# Patient Record
Sex: FEMALE | Race: WHITE | NOT HISPANIC OR LATINO | Employment: OTHER | URBAN - METROPOLITAN AREA
[De-identification: names, ages, dates, MRNs, and addresses within clinical notes are randomized per-mention and may not be internally consistent; named-entity substitution may affect disease eponyms.]

---

## 2017-08-30 ENCOUNTER — HOSPITAL ENCOUNTER (OUTPATIENT)
Facility: HOSPITAL | Age: 67
Setting detail: OBSERVATION
LOS: 1 days | Discharge: HOME | End: 2017-09-03
Attending: EMERGENCY MEDICINE | Admitting: FAMILY MEDICINE

## 2017-08-30 DIAGNOSIS — R07.9 CHEST PAIN, UNSPECIFIED TYPE: Primary | ICD-10-CM

## 2017-08-30 DIAGNOSIS — R26.2 DIFFICULTY WALKING: ICD-10-CM

## 2017-08-30 PROCEDURE — 93005 ELECTROCARDIOGRAM TRACING: CPT | Performed by: EMERGENCY MEDICINE

## 2017-08-30 PROCEDURE — 99285 EMERGENCY DEPT VISIT HI MDM: CPT

## 2017-08-31 ENCOUNTER — APPOINTMENT (OUTPATIENT)
Dept: NUCLEAR MEDICINE | Facility: HOSPITAL | Age: 67
End: 2017-08-31

## 2017-08-31 ENCOUNTER — APPOINTMENT (OUTPATIENT)
Dept: GENERAL RADIOLOGY | Facility: HOSPITAL | Age: 67
End: 2017-08-31

## 2017-08-31 PROBLEM — R07.9 CHEST PAIN: Status: ACTIVE | Noted: 2017-08-31

## 2017-08-31 LAB
ALBUMIN SERPL-MCNC: 3.8 G/DL (ref 3.5–5.2)
ALBUMIN/GLOB SERPL: 1.6 G/DL
ALP SERPL-CCNC: 46 U/L (ref 39–117)
ALT SERPL W P-5'-P-CCNC: 23 U/L (ref 1–33)
ANION GAP SERPL CALCULATED.3IONS-SCNC: 13.6 MMOL/L
AST SERPL-CCNC: 19 U/L (ref 1–32)
BASOPHILS # BLD AUTO: 0.02 10*3/MM3 (ref 0–0.2)
BASOPHILS NFR BLD AUTO: 0.4 % (ref 0–1.5)
BILIRUB SERPL-MCNC: 0.3 MG/DL (ref 0.1–1.2)
BUN BLD-MCNC: 16 MG/DL (ref 8–23)
BUN/CREAT SERPL: 19 (ref 7–25)
CALCIUM SPEC-SCNC: 8.8 MG/DL (ref 8.6–10.5)
CHLORIDE SERPL-SCNC: 105 MMOL/L (ref 98–107)
CHOLEST SERPL-MCNC: 160 MG/DL (ref 0–200)
CO2 SERPL-SCNC: 27.4 MMOL/L (ref 22–29)
CREAT BLD-MCNC: 0.84 MG/DL (ref 0.57–1)
D DIMER PPP FEU-MCNC: <0.27 MCGFEU/ML (ref 0–0.49)
DEPRECATED RDW RBC AUTO: 46.8 FL (ref 37–54)
EOSINOPHIL # BLD AUTO: 0.1 10*3/MM3 (ref 0–0.7)
EOSINOPHIL NFR BLD AUTO: 1.8 % (ref 0.3–6.2)
ERYTHROCYTE [DISTWIDTH] IN BLOOD BY AUTOMATED COUNT: 13.4 % (ref 11.7–13)
GFR SERPL CREATININE-BSD FRML MDRD: 68 ML/MIN/1.73
GLOBULIN UR ELPH-MCNC: 2.4 GM/DL
GLUCOSE BLD-MCNC: 107 MG/DL (ref 65–99)
HCT VFR BLD AUTO: 41.3 % (ref 35.6–45.5)
HDLC SERPL-MCNC: 71 MG/DL (ref 40–60)
HGB BLD-MCNC: 13.2 G/DL (ref 11.9–15.5)
IMM GRANULOCYTES # BLD: 0 10*3/MM3 (ref 0–0.03)
IMM GRANULOCYTES NFR BLD: 0 % (ref 0–0.5)
INR PPP: 0.98 (ref 0.9–1.1)
LDLC SERPL CALC-MCNC: 76 MG/DL (ref 0–100)
LDLC/HDLC SERPL: 1.07 {RATIO}
LIPASE SERPL-CCNC: 44 U/L (ref 13–60)
LYMPHOCYTES # BLD AUTO: 1.08 10*3/MM3 (ref 0.9–4.8)
LYMPHOCYTES NFR BLD AUTO: 18.9 % (ref 19.6–45.3)
MCH RBC QN AUTO: 30.7 PG (ref 26.9–32)
MCHC RBC AUTO-ENTMCNC: 32 G/DL (ref 32.4–36.3)
MCV RBC AUTO: 96 FL (ref 80.5–98.2)
MONOCYTES # BLD AUTO: 0.52 10*3/MM3 (ref 0.2–1.2)
MONOCYTES NFR BLD AUTO: 9.1 % (ref 5–12)
NEUTROPHILS # BLD AUTO: 3.99 10*3/MM3 (ref 1.9–8.1)
NEUTROPHILS NFR BLD AUTO: 69.8 % (ref 42.7–76)
NT-PROBNP SERPL-MCNC: 87.6 PG/ML (ref 5–900)
PLATELET # BLD AUTO: 175 10*3/MM3 (ref 140–500)
PMV BLD AUTO: 10.4 FL (ref 6–12)
POTASSIUM BLD-SCNC: 4 MMOL/L (ref 3.5–5.2)
PROT SERPL-MCNC: 6.2 G/DL (ref 6–8.5)
PROTHROMBIN TIME: 12.6 SECONDS (ref 11.7–14.2)
RBC # BLD AUTO: 4.3 10*6/MM3 (ref 3.9–5.2)
SODIUM BLD-SCNC: 146 MMOL/L (ref 136–145)
TRIGL SERPL-MCNC: 64 MG/DL (ref 0–150)
TROPONIN T SERPL-MCNC: <0.01 NG/ML (ref 0–0.03)
TROPONIN T SERPL-MCNC: <0.01 NG/ML (ref 0–0.03)
VLDLC SERPL-MCNC: 12.8 MG/DL (ref 5–40)
WBC NRBC COR # BLD: 5.71 10*3/MM3 (ref 4.5–10.7)

## 2017-08-31 PROCEDURE — 80061 LIPID PANEL: CPT | Performed by: FAMILY MEDICINE

## 2017-08-31 PROCEDURE — 85379 FIBRIN DEGRADATION QUANT: CPT | Performed by: EMERGENCY MEDICINE

## 2017-08-31 PROCEDURE — G0378 HOSPITAL OBSERVATION PER HR: HCPCS

## 2017-08-31 PROCEDURE — A9500 TC99M SESTAMIBI: HCPCS | Performed by: FAMILY MEDICINE

## 2017-08-31 PROCEDURE — 25010000002 REGADENOSON 0.4 MG/5ML SOLUTION: Performed by: FAMILY MEDICINE

## 2017-08-31 PROCEDURE — 93018 CV STRESS TEST I&R ONLY: CPT | Performed by: INTERNAL MEDICINE

## 2017-08-31 PROCEDURE — 84484 ASSAY OF TROPONIN QUANT: CPT | Performed by: EMERGENCY MEDICINE

## 2017-08-31 PROCEDURE — 0 TECHNETIUM SESTAMIBI: Performed by: FAMILY MEDICINE

## 2017-08-31 PROCEDURE — 83690 ASSAY OF LIPASE: CPT | Performed by: EMERGENCY MEDICINE

## 2017-08-31 PROCEDURE — 78452 HT MUSCLE IMAGE SPECT MULT: CPT | Performed by: INTERNAL MEDICINE

## 2017-08-31 PROCEDURE — 93016 CV STRESS TEST SUPVJ ONLY: CPT | Performed by: INTERNAL MEDICINE

## 2017-08-31 PROCEDURE — 93010 ELECTROCARDIOGRAM REPORT: CPT | Performed by: INTERNAL MEDICINE

## 2017-08-31 PROCEDURE — 93017 CV STRESS TEST TRACING ONLY: CPT

## 2017-08-31 PROCEDURE — 99221 1ST HOSP IP/OBS SF/LOW 40: CPT | Performed by: FAMILY MEDICINE

## 2017-08-31 PROCEDURE — 80053 COMPREHEN METABOLIC PANEL: CPT | Performed by: EMERGENCY MEDICINE

## 2017-08-31 PROCEDURE — 83880 ASSAY OF NATRIURETIC PEPTIDE: CPT | Performed by: EMERGENCY MEDICINE

## 2017-08-31 PROCEDURE — 25010000002 ONDANSETRON PER 1 MG: Performed by: FAMILY MEDICINE

## 2017-08-31 PROCEDURE — 96374 THER/PROPH/DIAG INJ IV PUSH: CPT

## 2017-08-31 PROCEDURE — 71020 HC CHEST PA AND LATERAL: CPT

## 2017-08-31 PROCEDURE — 84484 ASSAY OF TROPONIN QUANT: CPT | Performed by: FAMILY MEDICINE

## 2017-08-31 PROCEDURE — 78452 HT MUSCLE IMAGE SPECT MULT: CPT

## 2017-08-31 PROCEDURE — 85025 COMPLETE CBC W/AUTO DIFF WBC: CPT | Performed by: EMERGENCY MEDICINE

## 2017-08-31 PROCEDURE — 85610 PROTHROMBIN TIME: CPT | Performed by: EMERGENCY MEDICINE

## 2017-08-31 RX ORDER — ONDANSETRON 2 MG/ML
4 INJECTION INTRAMUSCULAR; INTRAVENOUS EVERY 6 HOURS PRN
Status: DISCONTINUED | OUTPATIENT
Start: 2017-08-31 | End: 2017-09-03 | Stop reason: HOSPADM

## 2017-08-31 RX ORDER — IBUPROFEN 400 MG/1
400 TABLET ORAL EVERY 6 HOURS PRN
Status: DISCONTINUED | OUTPATIENT
Start: 2017-08-31 | End: 2017-09-03 | Stop reason: HOSPADM

## 2017-08-31 RX ORDER — HYDROCODONE BITARTRATE AND ACETAMINOPHEN 5; 325 MG/1; MG/1
1 TABLET ORAL EVERY 4 HOURS PRN
Status: DISCONTINUED | OUTPATIENT
Start: 2017-08-31 | End: 2017-09-03 | Stop reason: HOSPADM

## 2017-08-31 RX ORDER — SODIUM CHLORIDE 0.9 % (FLUSH) 0.9 %
1-10 SYRINGE (ML) INJECTION AS NEEDED
Status: DISCONTINUED | OUTPATIENT
Start: 2017-08-31 | End: 2017-09-03 | Stop reason: HOSPADM

## 2017-08-31 RX ORDER — SODIUM CHLORIDE 0.9 % (FLUSH) 0.9 %
10 SYRINGE (ML) INJECTION AS NEEDED
Status: DISCONTINUED | OUTPATIENT
Start: 2017-08-31 | End: 2017-09-03 | Stop reason: HOSPADM

## 2017-08-31 RX ADMIN — REGADENOSON 0.4 MG: 0.08 INJECTION, SOLUTION INTRAVENOUS at 10:30

## 2017-08-31 RX ADMIN — ONDANSETRON 4 MG: 2 INJECTION INTRAMUSCULAR; INTRAVENOUS at 08:51

## 2017-08-31 RX ADMIN — NITROGLYCERIN 1 INCH: 20 OINTMENT TOPICAL at 00:35

## 2017-08-31 RX ADMIN — TECHNETIUM TC-99M SESTAMIBI 1 DOSE: 1 INJECTION INTRAVENOUS at 09:05

## 2017-08-31 RX ADMIN — TECHNETIUM TC-99M SESTAMIBI 1 DOSE: 1 INJECTION INTRAVENOUS at 10:30

## 2017-08-31 RX ADMIN — IBUPROFEN 400 MG: 400 TABLET ORAL at 09:44

## 2017-08-31 RX ADMIN — HYDROCODONE BITARTRATE AND ACETAMINOPHEN 1 TABLET: 5; 325 TABLET ORAL at 21:52

## 2017-08-31 RX ADMIN — IBUPROFEN 400 MG: 400 TABLET ORAL at 17:40

## 2017-09-01 PROBLEM — M94.0 COSTOCHONDRITIS, ACUTE: Status: ACTIVE | Noted: 2017-09-01

## 2017-09-01 PROBLEM — M62.81 POST-POLIO LIMB MUSCLE WEAKNESS: Chronic | Status: ACTIVE | Noted: 2017-09-01

## 2017-09-01 PROBLEM — B91 POST-POLIO LIMB MUSCLE WEAKNESS: Chronic | Status: ACTIVE | Noted: 2017-09-01

## 2017-09-01 LAB
BH CV STRESS COMMENTS STAGE 1: NORMAL
BH CV STRESS DOSE REGADENOSON STAGE 1: 0.4
BH CV STRESS DURATION MIN STAGE 1: 0
BH CV STRESS DURATION SEC STAGE 1: 15
BH CV STRESS PROTOCOL 1: NORMAL
BH CV STRESS RECOVERY BP: NORMAL MMHG
BH CV STRESS RECOVERY HR: 75 BPM
BH CV STRESS STAGE 1: 1
LV EF NUC BP: 65 %
MAXIMAL PREDICTED HEART RATE: 154 BPM
PERCENT MAX PREDICTED HR: 62.34 %
STRESS BASELINE BP: NORMAL MMHG
STRESS BASELINE HR: 62 BPM
STRESS PERCENT HR: 73 %
STRESS POST PEAK BP: NORMAL MMHG
STRESS POST PEAK HR: 96 BPM
STRESS TARGET HR: 131 BPM

## 2017-09-01 PROCEDURE — 99231 SBSQ HOSP IP/OBS SF/LOW 25: CPT | Performed by: FAMILY MEDICINE

## 2017-09-01 PROCEDURE — G0378 HOSPITAL OBSERVATION PER HR: HCPCS

## 2017-09-01 RX ORDER — MELOXICAM 7.5 MG/1
15 TABLET ORAL DAILY
Qty: 30 TABLET | Refills: 3 | Status: SHIPPED | OUTPATIENT
Start: 2017-09-01 | End: 2017-09-25 | Stop reason: DRUGHIGH

## 2017-09-01 NOTE — PROGRESS NOTES
Discharge Planning Assessment  [facility]     Patient Name: Irlanda Henry  MRN: [id number]  Today's Date: 9/1/2017    Admit Date: 8/30/2017          Discharge Needs Assessment       09/01/17 1243    Living Environment    Lives With parent(s);other (see comments)   mother and grandson.    Living Arrangements house    Home Accessibility bed and bath on same level;stairs to enter home    Number of Stairs to Enter Home 6    Stair Railings at Home outside, present on right side    Type of Financial/Environmental Concern none    Transportation Available family or friend will provide;car    Living Environment    Provides Primary Care For parent(s)    Quality Of Family Relationships supportive    Able to Return to Prior Living Arrangements yes    Discharge Needs Assessment    Concerns To Be Addressed denies needs/concerns at this time;no discharge needs identified    Readmission Within The Last 30 Days no previous admission in last 30 days    Anticipated Changes Related to Illness none    Equipment Currently Used at Home crutches   brace    Equipment Needed After Discharge none    Discharge Disposition still a patient            Discharge Plan       09/01/17 1239    Case Management/Social Work Plan    Plan Home with family    Patient/Family In Agreement With Plan yes    Additional Comments Spoke with patient at bedside, introduced self and explained CCP role. Verified facesheet and added Pharmacy is [address] Pt is independent with crutches or brace at home. Pt lives with her mother and grandson. Pt denies any other DME , HH or SNF. Observation status reviewed and moon notice signed. Pt states her brother or friend will drive her home.[place] CASE/CCP        Discharge Placement     No information found        Expected Discharge Date and Time     Expected Discharge Date Expected Discharge Time    Sep 1, 2017               Demographic Summary       09/01/17 1241    Referral Information    Admission Type  observation    Referral Source admission list    Record Reviewed history and physical;medical record;patient profile    Contact Information    Permission Granted to Share Information With family/designee;primary care physician    Primary Care Physician Information    Name Dr. Calvin Akins    Phone [phone]            Functional Status       09/01/17 1242    Functional Status Current    Ambulation 1-->assistive equipment    Transferring 1-->assistive equipment    Toileting 1-->assistive equipment    Bathing 0-->independent    Dressing 0-->independent    Eating 0-->independent    Communication 0-->understands/communicates without difficulty    Swallowing (if score 2 or more for any item, consult Rehab Services) 0-->swallows foods/liquids without difficulty    Change in Functional Status Since Onset of Current Illness/Injury no    Functional Status Prior    Ambulation 1-->assistive equipment    Transferring 1-->assistive equipment    Toileting 1-->assistive equipment    Bathing 0-->independent    Dressing 0-->independent    Eating 0-->independent    Communication 0-->understands/communicates without difficulty    Swallowing 0-->swallows foods/liquids without difficulty    IADL    Medications independent    Meal Preparation independent    Housekeeping independent    Laundry independent    Shopping independent    Oral Care independent    Activity Tolerance    Usual Activity Tolerance good    Current Activity Tolerance good    Cognitive/Perceptual/Developmental    Current Mental Status/Cognitive Functioning no deficits noted    Recent Changes in Mental Status/Cognitive Functioning no changes    Employment/Financial    Financial Concerns none            Psychosocial     None            Abuse/Neglect     None            Legal     None            Substance Abuse     None            Patient Forms       09/01/17 1244    Patient Forms    Patient Observation Letter Delivered    Delivered to Patient    Method of delivery  In person          Corie Martines RN

## 2017-09-01 NOTE — PLAN OF CARE
Problem: Patient Care Overview (Adult)  Goal: Plan of Care Review  Outcome: Ongoing (interventions implemented as appropriate)    09/01/17 0328   Coping/Psychosocial Response Interventions   Plan Of Care Reviewed With patient   Patient Care Overview   Progress improving   Outcome Evaluation   Outcome Summary/Follow up Plan pt is alert and oriented, room air, SR/SB, no falls, up with assist x 1, pt complaining of headache, medicated PRN, no complaints of chest pain, awaiting stress test results, possible D/C, continue to monitor       Goal: Adult Individualization and Mutuality  Outcome: Ongoing (interventions implemented as appropriate)  Goal: Discharge Needs Assessment  Outcome: Ongoing (interventions implemented as appropriate)    Problem: Pain, Acute (Adult)  Goal: Acceptable Pain Control/Comfort Level  Outcome: Ongoing (interventions implemented as appropriate)    Problem: Fall Risk (Adult)  Goal: Absence of Falls  Outcome: Ongoing (interventions implemented as appropriate)

## 2017-09-01 NOTE — DISCHARGE SUMMARY
Date of Discharge:  9/1/2017  Presenting Problem/History of Present Illness  Chest pain, unspecified type [R07.9]  Chest pain, unspecified type [R07.9]    Discharge Diagnosis: 1) chest pain-cardiac origin ruled out  2) acute L. costochondritis  3_ Post-polio muscle weakness  Hospital Course  Patient is a 67 y.o. female presented with severe L chest pain on and off for weeks. Worse lying in bed. Cardiolyte stress test normal    Procedures Performed         Consults:   Consults     Date and Time Order Name Status Description    8/31/2017 0055 Family Medicine Consult Completed           Pertinent Test Results:   Lab Results (last 7 days)     Procedure Component Value Units Date/Time    CBC & Differential [[id number]] Collected:  08/31/17 0019    Specimen:  Blood Updated:  08/31/17 0029    Narrative:       The following orders were created for panel order CBC & Differential.  Procedure                               Abnormality         Status                     ---------                               -----------         ------                     CBC Auto Differential[[id number]]        Abnormal            Final result                 Please view results for these tests on the individual orders.    CBC Auto Differential [[id number]]  (Abnormal) Collected:  08/31/17 0019    Specimen:  Blood Updated:  08/31/17 0029     WBC 5.71 10*3/mm3      RBC 4.30 10*6/mm3      Hemoglobin 13.2 g/dL      Hematocrit 41.3 %      MCV 96.0 fL      MCH 30.7 pg      MCHC 32.0 (L) g/dL      RDW 13.4 (H) %      RDW-SD 46.8 fl      MPV 10.4 fL      Platelets 175 10*3/mm3      Neutrophil % 69.8 %      Lymphocyte % 18.9 (L) %      Monocyte % 9.1 %      Eosinophil % 1.8 %      Basophil % 0.4 %      Immature Grans % 0.0 %      Neutrophils, Absolute 3.99 10*3/mm3      Lymphocytes, Absolute 1.08 10*3/mm3      Monocytes, Absolute 0.52 10*3/mm3      Eosinophils, Absolute 0.10 10*3/mm3      Basophils, Absolute 0.02 10*3/mm3      Immature Grans, Absolute  0.00 10*3/mm3     Protime-INR [[id number]]  (Normal) Collected:  08/31/17 0019    Specimen:  Blood Updated:  08/31/17 0038     Protime 12.6 Seconds      INR 0.98    D-dimer, Quantitative [[id number]]  (Normal) Collected:  08/31/17 0019    Specimen:  Blood Updated:  08/31/17 0047     D-Dimer, Quantitative <0.27 MCGFEU/mL     Narrative:       The Stago D-Dimer test used in conjunction with a clinical pretest probability (PTP) assessment model, has been approved by the FDA to rule out the presence of venous thromboembolism (VTE) in outpatients suspected of deep venous thrombosis (DVT) or pulmonary embolism (PE).     BNP [[id number]]  (Normal) Collected:  08/31/17 0019    Specimen:  Blood Updated:  08/31/17 0050     proBNP 87.6 pg/mL     Narrative:       Among patients with dyspnea, NT-proBNP is highly sensitive for the detection of acute congestive heart failure. In addition NT-proBNP of <300 pg/ml effectively rules out acute congestive heart failure with 99% negative predictive value.    Troponin [[id number]]  (Normal) Collected:  08/31/17 0019    Specimen:  Blood Updated:  08/31/17 0052     Troponin T <0.010 ng/mL     Narrative:       Troponin T Reference Ranges:  Less than 0.03 ng/mL:    Negative for AMI  0.03 to 0.09 ng/mL:      Indeterminant for AMI  Greater than 0.09 ng/mL: Positive for AMI    Comprehensive Metabolic Panel [[id number]]  (Abnormal) Collected:  08/31/17 0019    Specimen:  Blood Updated:  08/31/17 0052     Glucose 107 (H) mg/dL      BUN 16 mg/dL      Creatinine 0.84 mg/dL      Sodium 146 (H) mmol/L      Potassium 4.0 mmol/L      Chloride 105 mmol/L      CO2 27.4 mmol/L      Calcium 8.8 mg/dL      Total Protein 6.2 g/dL      Albumin 3.80 g/dL      ALT (SGPT) 23 U/L      AST (SGOT) 19 U/L      Alkaline Phosphatase 46 U/L      Total Bilirubin 0.3 mg/dL      eGFR Non African Amer 68 mL/min/1.73      Globulin 2.4 gm/dL      A/G Ratio 1.6 g/dL      BUN/Creatinine Ratio 19.0     Anion Gap 13.6 mmol/L     Lipase  [[id number]]  (Normal) Collected:  08/31/17 0019    Specimen:  Blood Updated:  08/31/17 0052     Lipase 44 U/L     Lipid Panel [[id number]]  (Abnormal) Collected:  08/31/17 0435    Specimen:  Blood Updated:  08/31/17 0548     Total Cholesterol 160 mg/dL      Triglycerides 64 mg/dL      HDL Cholesterol 71 (H) mg/dL      LDL Cholesterol  76 mg/dL      VLDL Cholesterol 12.8 mg/dL      LDL/HDL Ratio 1.07    Narrative:       Cholesterol Reference Ranges  (U.S. Department of Health and Human Services ATP III Classifications)    Desirable          <200 mg/dL  Borderline High    200-239 mg/dL  High Risk          >240 mg/dL      Triglyceride Reference Ranges  (U.S. Department of Health and Human Services ATP III Classifications)    Normal           <150 mg/dL  Borderline High  150-199 mg/dL  High             200-499 mg/dL  Very High        >500 mg/dL    HDL Reference Ranges  (U.S. Department of Health and Human Services ATP III Classifcations)    Low     <40 mg/dl (major risk factor for CHD)  High    >60 mg/dl ('negative' risk factor for CHD)        LDL Reference Ranges  (U.S. Department of Health and Human Services ATP III Classifcations)    Optimal          <100 mg/dL  Near Optimal     100-129 mg/dL  Borderline High  130-159 mg/dL  High             160-189 mg/dL  Very High        >189 mg/dL    Troponin [[id number]]  (Normal) Collected:  08/31/17 0435    Specimen:  Blood Updated:  08/31/17 0549     Troponin T <0.010 ng/mL     Narrative:       Troponin T Reference Ranges:  Less than 0.03 ng/mL:    Negative for AMI  0.03 to 0.09 ng/mL:      Indeterminant for AMI  Greater than 0.09 ng/mL: Positive for AMI              Condition on Discharge:  good    Physical Exam at Discharge  General Appearance:  awake, alert, oriented, in no acute distress  Lungs:  Normal expansion.  Clear to auscultation.  No rales, rhonchi, or wheezing. L costochondral tenderness    Heart:  Heart sounds are normal.  Regular rate and rhythm without murmur,  "gallop or rub.  Abdomen:  Soft, non-tender, normal bowel sounds; no bruits, organomegaly or masses.  Extremities: Extremities warm to touch, pink, with no edema.  Joint:  L elbow:  Left:  tenderness at lateral epicondyle  Neurologic:  Alert and oriented x 3, gait normal., reflexes normal and symmetric, strength and  sensation grossly normal    Vital Signs  Temp:  [97.6 °F (36.4 °C)-98.1 °F (36.7 °C)] 97.8 °F (36.6 °C)  Heart Rate:  [62-74] 62  Resp:  [16-18] 16  BP: (102-122)/(61-75) 114/71    Flowsheet Rows         First Filed Value    Admission Height  64\" (162.6 cm) Documented at 08/30/2017 2350    Admission Weight  157 lb (71.2 kg) Documented at 08/30/2017 2350              Discharge Disposition  home    Discharge Medications   Irlanda Henry   Home Medication Instructions CHERRI:[id number]    Printed on:09/01/17 4413   Medication Information                      meloxicam (MOBIC) 7.5 MG tablet  Take 2 tablets by mouth Daily.                 Discharge Diet:   Diet Instructions     Diet: Regular; Thin       Discharge Diet:  Regular   Fluid Consistency:  Thin                 Activity at Discharge:     Follow-up Appointments  No future appointments.  Additional Instructions for the Follow-ups that You Need to Schedule     Discharge Follow-up with PCP    As directed    Follow Up Details:  Dr. Akins 2 weeks                 Test Results Pending at Discharge       Calvin Akins MD  09/01/17  9:55 AM      Addenda: Pt was discharged on 9-1 with a neg stress test pending the Cardilyt portion . The cardiolyte portion showed  an area of questionable ischemia. Due to this cardiology was consulted and she had a cardiac cath. which was negative. She is now clear for discharge home. On 9-3 "

## 2017-09-02 PROCEDURE — 93005 ELECTROCARDIOGRAM TRACING: CPT | Performed by: INTERNAL MEDICINE

## 2017-09-02 PROCEDURE — 99203 OFFICE O/P NEW LOW 30 MIN: CPT | Performed by: INTERNAL MEDICINE

## 2017-09-02 PROCEDURE — G0378 HOSPITAL OBSERVATION PER HR: HCPCS

## 2017-09-02 PROCEDURE — 25010000002 FENTANYL CITRATE (PF) 100 MCG/2ML SOLUTION: Performed by: INTERNAL MEDICINE

## 2017-09-02 PROCEDURE — C1769 GUIDE WIRE: HCPCS | Performed by: INTERNAL MEDICINE

## 2017-09-02 PROCEDURE — 25010000002 MIDAZOLAM PER 1 MG: Performed by: INTERNAL MEDICINE

## 2017-09-02 PROCEDURE — 93454 CORONARY ARTERY ANGIO S&I: CPT | Performed by: INTERNAL MEDICINE

## 2017-09-02 PROCEDURE — 93010 ELECTROCARDIOGRAM REPORT: CPT | Performed by: INTERNAL MEDICINE

## 2017-09-02 PROCEDURE — C1894 INTRO/SHEATH, NON-LASER: HCPCS | Performed by: INTERNAL MEDICINE

## 2017-09-02 PROCEDURE — 0 IOPAMIDOL PER 1 ML: Performed by: INTERNAL MEDICINE

## 2017-09-02 PROCEDURE — 25010000002 HEPARIN (PORCINE) PER 1000 UNITS: Performed by: INTERNAL MEDICINE

## 2017-09-02 PROCEDURE — 99152 MOD SED SAME PHYS/QHP 5/>YRS: CPT | Performed by: INTERNAL MEDICINE

## 2017-09-02 PROCEDURE — 96376 TX/PRO/DX INJ SAME DRUG ADON: CPT

## 2017-09-02 PROCEDURE — 25010000002 ONDANSETRON PER 1 MG: Performed by: FAMILY MEDICINE

## 2017-09-02 RX ORDER — PHENYLEPHRINE HCL IN 0.9% NACL 0.5 MG/5ML
SYRINGE (ML) INTRAVENOUS AS NEEDED
Status: DISCONTINUED | OUTPATIENT
Start: 2017-09-02 | End: 2017-09-02 | Stop reason: HOSPADM

## 2017-09-02 RX ORDER — ACETAMINOPHEN 325 MG/1
650 TABLET ORAL EVERY 6 HOURS PRN
Status: DISCONTINUED | OUTPATIENT
Start: 2017-09-02 | End: 2017-09-03 | Stop reason: HOSPADM

## 2017-09-02 RX ORDER — SODIUM CHLORIDE 9 MG/ML
INJECTION, SOLUTION INTRAVENOUS CONTINUOUS PRN
Status: DISCONTINUED | OUTPATIENT
Start: 2017-09-02 | End: 2017-09-02 | Stop reason: HOSPADM

## 2017-09-02 RX ORDER — LIDOCAINE HYDROCHLORIDE 20 MG/ML
INJECTION, SOLUTION INFILTRATION; PERINEURAL AS NEEDED
Status: DISCONTINUED | OUTPATIENT
Start: 2017-09-02 | End: 2017-09-02 | Stop reason: HOSPADM

## 2017-09-02 RX ORDER — FENTANYL CITRATE 50 UG/ML
INJECTION, SOLUTION INTRAMUSCULAR; INTRAVENOUS AS NEEDED
Status: DISCONTINUED | OUTPATIENT
Start: 2017-09-02 | End: 2017-09-02 | Stop reason: HOSPADM

## 2017-09-02 RX ORDER — DOCUSATE SODIUM 100 MG/1
100 CAPSULE, LIQUID FILLED ORAL 2 TIMES DAILY PRN
Status: DISCONTINUED | OUTPATIENT
Start: 2017-09-02 | End: 2017-09-03 | Stop reason: HOSPADM

## 2017-09-02 RX ORDER — MELOXICAM 15 MG/1
15 TABLET ORAL DAILY
Status: DISCONTINUED | OUTPATIENT
Start: 2017-09-02 | End: 2017-09-03 | Stop reason: HOSPADM

## 2017-09-02 RX ORDER — MIDAZOLAM HYDROCHLORIDE 1 MG/ML
INJECTION INTRAMUSCULAR; INTRAVENOUS AS NEEDED
Status: DISCONTINUED | OUTPATIENT
Start: 2017-09-02 | End: 2017-09-02 | Stop reason: HOSPADM

## 2017-09-02 RX ORDER — SODIUM CHLORIDE 9 MG/ML
125 INJECTION, SOLUTION INTRAVENOUS CONTINUOUS
Status: ACTIVE | OUTPATIENT
Start: 2017-09-02 | End: 2017-09-02

## 2017-09-02 RX ADMIN — ACETAMINOPHEN 650 MG: 325 TABLET ORAL at 16:56

## 2017-09-02 RX ADMIN — HYDROCODONE BITARTRATE AND ACETAMINOPHEN 1 TABLET: 5; 325 TABLET ORAL at 01:27

## 2017-09-02 RX ADMIN — HYDROCODONE BITARTRATE AND ACETAMINOPHEN 1 TABLET: 5; 325 TABLET ORAL at 13:46

## 2017-09-02 RX ADMIN — DOCUSATE SODIUM 100 MG: 100 CAPSULE ORAL at 21:08

## 2017-09-02 RX ADMIN — ONDANSETRON 4 MG: 2 INJECTION INTRAMUSCULAR; INTRAVENOUS at 13:31

## 2017-09-02 RX ADMIN — ONDANSETRON 4 MG: 2 INJECTION INTRAMUSCULAR; INTRAVENOUS at 06:51

## 2017-09-02 RX ADMIN — MELOXICAM 15 MG: 15 TABLET ORAL at 16:49

## 2017-09-02 RX ADMIN — SODIUM CHLORIDE 125 ML/HR: 9 INJECTION, SOLUTION INTRAVENOUS at 11:23

## 2017-09-02 NOTE — PLAN OF CARE
Problem: Patient Care Overview (Adult)  Goal: Plan of Care Review  Outcome: Ongoing (interventions implemented as appropriate)    09/02/17 1506   Coping/Psychosocial Response Interventions   Plan Of Care Reviewed With patient   Patient Care Overview   Progress no change   Outcome Evaluation   Outcome Summary/Follow up Plan pt had heart cath today which was normal. pt c/o h/a and nausea after cath. started on meloxicam for costochondritis       Goal: Adult Individualization and Mutuality  Outcome: Ongoing (interventions implemented as appropriate)  Goal: Discharge Needs Assessment  Outcome: Ongoing (interventions implemented as appropriate)

## 2017-09-02 NOTE — CONSULTS
Patient Name: Irlanda Henry  :1950  67 y.o.    Date of Admission: 2017  Date of Consultation:  17  Encounter Provider: Irlanda Erazo RN  Place of Service: [facility] CARDIOLOGY  Referring Provider: Calvin Akins MD  Patient Care Team:  Calvin Akins MD as PCP - General  Calvin Akins MD as PCP - Family Medicine      Chief complaint: chest pain    Consulted for: abnormal stress test    History of Present Illness:    67-year-old female with history of vasovagal syncope as well as a history of polio.  She has feeling in her right lower extremity but has no movement.  She was admitted complaining of chest discomfort feeling like bricks on her chest.  She had a little bit of dizziness lightheadedness nausea and diaphoresis associated with it.  Vital signs were initially normal workup included a stress test that showed a small sized area of mild ischemia in the inferior wall.  We were asked to see for possible cardiac catheterization.      Past Medical History:   Diagnosis Date   • Polio    • Vasovagal syncope        Past Surgical History:   Procedure Laterality Date   • CATARACT EXTRACTION     • CHOLECYSTECTOMY     • LEG SURGERY      multiple surgeries for polio   • RETINAL DETACHMENT REPAIR Left          Prior to Admission medications    Medication Sig Start Date End Date Taking? Authorizing Provider   meloxicam (MOBIC) 7.5 MG tablet Take 2 tablets by mouth Daily. 17   Calvin Akins MD       No Known Allergies    Social History     Social History   • Marital status: Legally      Spouse name: N/A   • Number of children: N/A   • Years of education: N/A     Social History Main Topics   • Smoking status: Never Smoker   • Smokeless tobacco: None   • Alcohol use Yes      Comment: rare   • Drug use: No   • Sexual activity: Defer     Other Topics Concern   • None     Social History Narrative       History reviewed. No pertinent family  history.    REVIEW OF SYSTEMS:   All systems reviewed.  Pertinent positives identified in HPI.  All other systems are negative.      Objective:     Vitals:    09/01/17 1959 09/01/17 2317 09/02/17 0500 09/02/17 0700   BP: 112/78 110/65  119/77   BP Location: Left arm Right arm  Left arm   Patient Position: Lying Lying  Lying   Pulse: 79 66  73   Resp: 18 18  18   Temp: 97.6 °F (36.4 °C) 98.5 °F (36.9 °C)  98.2 °F (36.8 °C)   TempSrc: Oral Oral  Oral   SpO2:    96%   Weight:   153 lb 4.8 oz (69.5 kg)    Height:         Body mass index is 26.31 kg/(m^2).    General Appearance:    Alert, cooperative, in no acute distress   Head:    Normocephalic, without obvious abnormality, atraumatic   Eyes:            Lids and lashes normal, conjunctivae and sclerae normal, no   icterus, no pallor, corneas clear, PERRLA   Ears:    Ears appear intact with no abnormalities noted   Throat:   No oral lesions, no thrush, oral mucosa moist   Neck:   No adenopathy, supple, trachea midline, no thyromegaly, no   carotid bruit, no JVD   Back:     No kyphosis present, no scoliosis present, no skin lesions, erythema or scars, no tenderness to percussion or palpation, range of motion normal   Lungs:     Clear to auscultation,respirations regular, even and unlabored    Heart:    Regular rhythm and normal rate, normal S1 and S2, no murmur, no gallop, no rub, no click   Chest Wall:    No abnormalities observed   Abdomen:     Normal bowel sounds, no masses, no organomegaly, soft        non-tender, non-distended, no guarding, no rebound  tenderness   Extremities:   Moves all extremities well, no edema, no cyanosis, no redness   Pulses:   Pulses palpable and equal bilaterally. Normal radial, carotid, femoral, dorsalis pedis and posterior tibial pulses bilaterally. Normal abdominal aorta   Skin:  Psychiatric:   No bleeding, bruising or rash    Alert and oriented x 3, normal mood and affect   Lab Review:       Results from last 7 days  Lab Units  08/31/17  0019   SODIUM mmol/L 146*   POTASSIUM mmol/L 4.0   CHLORIDE mmol/L 105   CO2 mmol/L 27.4   BUN mg/dL 16   CREATININE mg/dL 0.84   CALCIUM mg/dL 8.8   BILIRUBIN mg/dL 0.3   ALK PHOS U/L 46   ALT (SGPT) U/L 23   AST (SGOT) U/L 19   GLUCOSE mg/dL 107*       Results from last 7 days  Lab Units 08/31/17  0435 08/31/17  0019   TROPONIN T ng/mL <0.010 <0.010       Results from last 7 days  Lab Units 08/31/17  0019   WBC 10*3/mm3 5.71   HEMOGLOBIN g/dL 13.2   HEMATOCRIT % 41.3   PLATELETS 10*3/mm3 175       Results from last 7 days  Lab Units 08/31/17  0019   INR  0.98           Results from last 7 days  Lab Units 08/31/17  0435   CHOLESTEROL mg/dL 160   TRIGLYCERIDES mg/dL 64   HDL CHOL mg/dL 71*     Stress test 9/1  · Findings consistent with a normal ECG stress test.  · Left ventricular ejection fraction is normal (Calculated EF = 65%).  · Myocardial perfusion imaging indicates a small-sized, mildly severe area of ischemia located in the inferior wall.  · Impressions are consistent with an intermediate risk study    EKG:                  Assessment and Plan:       1.  Abnormal stress test.  Patient does have some GI symptoms consistent with inferior ischemia.  Due to the fact that she has polio and walks with crutches it is hard to determine her METS in light of that it is not unreasonable to a heart catheter.  Procedure was explained to the patient risks and benefits were also explained.  We'll set her up.    Irlanda Erazo RN  09/02/17  8:41 AM    Ming Bojorquez MD  [facility]  9/2/2017 9:23 AM

## 2017-09-02 NOTE — PLAN OF CARE
Problem: Patient Care Overview (Adult)  Goal: Plan of Care Review  Outcome: Ongoing (interventions implemented as appropriate)    09/02/17 0226   Coping/Psychosocial Response Interventions   Plan Of Care Reviewed With patient   Patient Care Overview   Progress progress toward functional goals as expected   Outcome Evaluation   Outcome Summary/Follow up Plan Pt denies chest pain this shift, pain medication provided for generalized body aches, VSS, Up to bathroom with standby assist,          Problem: Pain, Acute (Adult)  Goal: Acceptable Pain Control/Comfort Level  Outcome: Ongoing (interventions implemented as appropriate)    Problem: Fall Risk (Adult)  Goal: Absence of Falls  Outcome: Ongoing (interventions implemented as appropriate)    Problem: Cardiac Output, Decreased (Adult)  Goal: Adequate Cardiac Output/Effective Tissue Perfusion  Outcome: Ongoing (interventions implemented as appropriate)

## 2017-09-02 NOTE — PROGRESS NOTES
"Irlanda Henry  67 y.o.   LOS: 1 day   Patient Care Team:  Calvin Akins MD as PCP - General  Calvin Akins MD as PCP - Family Medicine    Chief Complaint: chest pain  I have reviewed the patient's medical history in detail and updated the computerized patient record.    Subjective     Patient is a 67 y.o. female presents withchest pain. Pt had a neg regular stress test but the cardiolyte portion showed  A segment of ? Ischemia. Cardiology consulted.    History taken from: patient RN    History  Past Medical History:   Diagnosis Date   • Polio    • Vasovagal syncope      Past Surgical History:   Procedure Laterality Date   • CATARACT EXTRACTION     • CHOLECYSTECTOMY     • LEG SURGERY      multiple surgeries for polio   • RETINAL DETACHMENT REPAIR Left      History reviewed. No pertinent family history.  Social History     Social History   • Marital status: Legally      Spouse name: N/A   • Number of children: N/A   • Years of education: N/A     Social History Main Topics   • Smoking status: Never Smoker   • Smokeless tobacco: None   • Alcohol use Yes      Comment: rare   • Drug use: No   • Sexual activity: Defer     Other Topics Concern   • None     Social History Narrative         Review of Systems  {All systems were reviewed and negative except for:  Neurological: positive for  headaches  Behavioral/Psych: positive for  anxiety    Objective     Lab Results (last 24 hours)     ** No results found for the last 24 hours. **          Tests reviewed: yes, Labs for last 24 hrs  Tests of interest are: cardiolyte sytress test ? +  Vital Signs  Temp:  [97.6 °F (36.4 °C)-98.5 °F (36.9 °C)] 98.2 °F (36.8 °C)  Heart Rate:  [66-79] 73  Resp:  [18] 18  BP: (110-119)/(65-90) 119/77    Flowsheet Rows         First Filed Value    Admission Height  64\" (162.6 cm) Documented at 08/30/2017 2350    Admission Weight  157 lb (71.2 kg) Documented at 08/30/2017 2350          No intake or output data in the 24 hours " ending 09/02/17 0923    Physical Exam:  General Appearance: alert, appears stated age and cooperative  Lungs: clear to auscultation, respirations regular, respirations even and respirations unlabored  Heart: regular rhythm & normal rate, normal S1, S2, no murmur, no bimal, no rub and no click  Abdomen: normal bowel sounds, no masses, no hepatomegaly, no splenomegaly, soft non-tender, no guarding and no rebound tenderness  Extremities: moves extremities well, no edema, no cyanosis and no redness  Neurologic: Mental Status orientated to person, place, time and situation       Medication Review:   Review of patient's allergies indicates no known allergies.  Current Facility-Administered Medications   Medication Dose Route Frequency Provider Last Rate Last Dose   • HYDROcodone-acetaminophen (NORCO) 5-325 MG per tablet 1 tablet  1 tablet Oral Q4H PRN Calvin Akins MD   1 tablet at 09/02/17 0127   • ibuprofen (ADVIL,MOTRIN) tablet 400 mg  400 mg Oral Q6H PRN Calvin Akins MD   400 mg at 08/31/17 1740   • ondansetron (ZOFRAN) injection 4 mg  4 mg Intravenous Q6H PRN Calvin Akins MD   4 mg at 09/02/17 0651   • sodium chloride 0.9 % flush 1-10 mL  1-10 mL Intravenous PRN Calvin Akins MD       • sodium chloride 0.9 % flush 10 mL  10 mL Intravenous PRN Trenton Salinas MD         No prescriptions prior to admission.       Medications reviewed: yes, and changes made are: none    Assessment:  Chest pain with ?  Stress test.      Plan:Continue current medications.     Treatment Plans:  1.    Consultations:cardiology4.    Other: cardiac cath today.  Calvin Akins MD  09/02/17  9:23 AM

## 2017-09-03 VITALS
RESPIRATION RATE: 18 BRPM | BODY MASS INDEX: 26.17 KG/M2 | HEART RATE: 78 BPM | DIASTOLIC BLOOD PRESSURE: 70 MMHG | HEIGHT: 64 IN | OXYGEN SATURATION: 94 % | SYSTOLIC BLOOD PRESSURE: 108 MMHG | TEMPERATURE: 98 F | WEIGHT: 153.3 LBS

## 2017-09-03 LAB
ANION GAP SERPL CALCULATED.3IONS-SCNC: 10.8 MMOL/L
BUN BLD-MCNC: 11 MG/DL (ref 8–23)
BUN/CREAT SERPL: 18 (ref 7–25)
CALCIUM SPEC-SCNC: 8.4 MG/DL (ref 8.6–10.5)
CHLORIDE SERPL-SCNC: 107 MMOL/L (ref 98–107)
CO2 SERPL-SCNC: 24.2 MMOL/L (ref 22–29)
CREAT BLD-MCNC: 0.61 MG/DL (ref 0.57–1)
DEPRECATED RDW RBC AUTO: 45.9 FL (ref 37–54)
ERYTHROCYTE [DISTWIDTH] IN BLOOD BY AUTOMATED COUNT: 13.2 % (ref 11.7–13)
GFR SERPL CREATININE-BSD FRML MDRD: 98 ML/MIN/1.73
GLUCOSE BLD-MCNC: 92 MG/DL (ref 65–99)
HCT VFR BLD AUTO: 37.8 % (ref 35.6–45.5)
HGB BLD-MCNC: 12.2 G/DL (ref 11.9–15.5)
MCH RBC QN AUTO: 30.7 PG (ref 26.9–32)
MCHC RBC AUTO-ENTMCNC: 32.3 G/DL (ref 32.4–36.3)
MCV RBC AUTO: 95.2 FL (ref 80.5–98.2)
PLATELET # BLD AUTO: 122 10*3/MM3 (ref 140–500)
PMV BLD AUTO: 10.1 FL (ref 6–12)
POTASSIUM BLD-SCNC: 4.1 MMOL/L (ref 3.5–5.2)
RBC # BLD AUTO: 3.97 10*6/MM3 (ref 3.9–5.2)
SODIUM BLD-SCNC: 142 MMOL/L (ref 136–145)
WBC NRBC COR # BLD: 4.35 10*3/MM3 (ref 4.5–10.7)

## 2017-09-03 PROCEDURE — 85027 COMPLETE CBC AUTOMATED: CPT | Performed by: INTERNAL MEDICINE

## 2017-09-03 PROCEDURE — G0378 HOSPITAL OBSERVATION PER HR: HCPCS

## 2017-09-03 PROCEDURE — 97110 THERAPEUTIC EXERCISES: CPT

## 2017-09-03 PROCEDURE — 80048 BASIC METABOLIC PNL TOTAL CA: CPT | Performed by: INTERNAL MEDICINE

## 2017-09-03 PROCEDURE — G8980 MOBILITY D/C STATUS: HCPCS

## 2017-09-03 PROCEDURE — 97161 PT EVAL LOW COMPLEX 20 MIN: CPT

## 2017-09-03 PROCEDURE — 99213 OFFICE O/P EST LOW 20 MIN: CPT | Performed by: INTERNAL MEDICINE

## 2017-09-03 PROCEDURE — G8979 MOBILITY GOAL STATUS: HCPCS

## 2017-09-03 RX ORDER — MELOXICAM 15 MG/1
15 TABLET ORAL DAILY
Qty: 30 TABLET | Refills: 3 | Status: SHIPPED | OUTPATIENT
Start: 2017-09-03 | End: 2017-09-25 | Stop reason: SDUPTHER

## 2017-09-03 RX ADMIN — MELOXICAM 15 MG: 15 TABLET ORAL at 08:47

## 2017-09-03 RX ADMIN — DOCUSATE SODIUM 100 MG: 100 CAPSULE ORAL at 08:49

## 2017-09-03 NOTE — PLAN OF CARE
Problem: Patient Care Overview (Adult)  Goal: Plan of Care Review  Outcome: Ongoing (interventions implemented as appropriate)    09/03/17 1303   Coping/Psychosocial Response Interventions   Plan Of Care Reviewed With patient   Outcome Evaluation   Outcome Summary/Follow up Plan Pt is 68 yo female admitted with chest pain. Cleared by cardiac MD to return home after cardiac cath. Dec use of RUE due to cath. Completed crutch training this afternoon without difficulty. Steady on LLE throughout with swing to gait pattern, NWB RLE due to childhood hx of polio. Pt with some soreness in R wrist but discussed ambulating short necessary distances at home without overdoing it. Pt reports she is able to ambulate at home without crutches using brace which would also reduce R UE pressure. No LOB and excellent bed mobility and transfer. Pt is safe to go home from mobility standpoint at this time and has no further skilled PT needs.

## 2017-09-03 NOTE — THERAPY DISCHARGE NOTE
Acute Care - Physical Therapy Initial Eval/Discharge  [facility]     Patient Name: Irlanda Henry  : 1950  MRN: [id number]  Today's Date: 9/3/2017   Onset of Illness/Injury or Date of Surgery Date: 17  Date of Referral to PT: 17  Referring Physician: Tashi      Admit Date: 2017    Visit Dx:    ICD-10-CM ICD-9-CM   1. Chest pain, unspecified type R07.9 786.50   2. Difficulty walking R26.2 719.7     Patient Active Problem List   Diagnosis   • Chest pain   • Costochondritis, acute   • Post-polio limb muscle weakness     Past Medical History:   Diagnosis Date   • Polio    • Vasovagal syncope      Past Surgical History:   Procedure Laterality Date   • CATARACT EXTRACTION     • CHOLECYSTECTOMY     • LEG SURGERY      multiple surgeries for polio   • RETINAL DETACHMENT REPAIR Left           PT ASSESSMENT (last 72 hours)      PT Evaluation       17 1200 17 0621    Rehab Evaluation    Document Type evaluation  -LS     Subjective Information agree to therapy;no complaints  -LS     Patient Effort, Rehab Treatment excellent  -LS     Symptoms Noted During/After Treatment none  -LS     General Information    Patient Profile Review yes  -LS     Onset of Illness/Injury or Date of Surgery Date 17  -LS     Referring Physician Tashi  -LS     General Observations Pt supine in bed, no distress  -LS     Pertinent History Of Current Problem Pt admitted with chest pain; cardiac cleared post cath  -LS     Prior Level of Function independent:  -LS     Equipment Currently Used at Home crutches   pt has used crutches since childhood due to polio; NWB RLE  -LS crutches  -DF    Plans/Goals Discussed With patient  -LS     Barriers to Rehab none identified  -LS     Living Environment    Lives With parent(s);child(alexandro), adult  -LS     Living Arrangements house  -LS     Home Accessibility bed and bath on same level  -LS     Number of Stairs to Enter Home 6  -LS     Stair Railings at Home outside,  present on right side  -LS     Transportation Available  car;family or friend will provide  -DF    Clinical Impression    Date of Referral to PT 09/03/17  -LS     Patient/Family Goals Statement Pt plans to return home with family.  -LS     Criteria for Skilled Therapeutic Interventions Met no problems identified which require skilled intervention;current level of function same as previous level of function  -LS     Pain Assessment    Pain Assessment No/denies pain  -LS     Cognitive Assessment/Intervention    Current Cognitive/Communication Assessment functional  -LS     Orientation Status oriented x 4  -LS     Follows Commands/Answers Questions 100% of the time  -LS     Personal Safety WNL/WFL  -LS     Personal Safety Interventions fall prevention program maintained;gait belt;nonskid shoes/slippers when out of bed  -LS     ROM (Range of Motion)    General ROM Detail no AROM RLE  -LS     MMT (Manual Muscle Testing)    General MMT Assessment Detail LLE grossly 5/5  -LS     Mobility Assessment/Training    Extremity Weight-Bearing Status right lower extremity  -LS     Right Lower Extremity Weight-Bearing non weight-bearing   pt has brace at home WBAT  -LS     Bed Mobility, Assessment/Treatment    Bed Mob, Supine to Sit, [place] independent  -LS     Bed Mob, Sit to Supine, [place] independent  -LS     Transfer Assessment/Treatment    Transfers, Sit-Stand [place] independent  -LS     Transfers, Stand-Sit [place] independent  -LS     Gait Assessment/Treatment    Gait, [place] Level supervision required  -LS     Gait, Assistive Device axillary crutches  -LS     Gait, Distance (Feet) 150  -LS     Gait, Gait Pattern Analysis swing-to gait  -LS     Gait, Maintain Weight Bearing Status able to maintain weight bearing status  -LS     Gait, Comment Some discomfort R wrist; able to reduce WBing   -LS     Positioning and Restraints    Pre-Treatment Position in bed  -LS     Post Treatment Position bed  -LS      In Bed supine;call light within reach;encouraged to call for assist;with nsg  -       09/01/17 1243       General Information    Equipment Currently Used at Home crutches   brace  -KM     Living Environment    Lives With parent(s);other (see comments)   mother and grandson.  -KM     Living Arrangements house  -KM     Home Accessibility bed and bath on same level;stairs to enter home  -KM     Number of Stairs to Enter Home 6  -KM     Stair Railings at Home outside, present on right side  -KM     Type of Financial/Environmental Concern none  -KM     Transportation Available family or friend will provide;car  -KM       User Key  (r) = Recorded By, (t) = Taken By, (c) = Cosigned By    Initials Name Provider Type    KM Corie Martines, MANPREET Case Manager    TINO Taylor, PT Physical Therapist    CARO Iverson RN Registered Nurse          Physical Therapy Education     Title: PT OT SLP Therapies (Resolved)     Topic: Physical Therapy (Resolved)     Point: Mobility training (Resolved)    Learning Progress Summary    Learner Readiness Method Response Comment Documented by Status   Patient Acceptance E,D,BENSON TAPIA 09/03/17 1306 Done               Point: Home exercise program (Resolved)    Learning Progress Summary    Learner Readiness Method Response Comment Documented by Status   Patient Acceptance E,D,BENSON TAPIA 09/03/17 1306 Done               Point: Body mechanics (Resolved)    Learning Progress Summary    Learner Readiness Method Response Comment Documented by Status   Patient Acceptance E,D,BENSON TAPIA 09/03/17 1306 Done               Point: Precautions (Resolved)    Learning Progress Summary    Learner Readiness Method Response Comment Documented by Status   Patient Acceptance E,D,BENSON TAPIA 09/03/17 1306 Done                      User Key     Initials Effective Dates Name Provider Type Discipline     06/14/16 -  Mirna Taylor, PT Physical Therapist PT                PT Recommendation and  Plan  Anticipated Discharge Disposition: home  Plan of Care Review  Plan Of Care Reviewed With: patient  Outcome Summary/Follow up Plan: Pt is 68 yo female admitted with chest pain. Cleared by cardiac MD to return home after cardiac cath. Dec use of RUE due to cath. Completed crutch training this afternoon without difficulty. Steady on LLE throughout with swing to gait pattern, NWB RLE due to childhood hx of polio. Pt with some soreness in R wrist but discussed ambulating short necessary distances at home without overdoing it. Pt reports she is able to ambulate at home without crutches using brace which would also reduce R UE pressure. No LOB and excellent bed mobility and transfer. Pt is safe to go home from mobility standpoint at this time and has no further skilled PT needs.               Outcome Measures       09/03/17 1300          How much help from another person do you currently need...    Turning from your back to your side while in flat bed without using bedrails? 4  -LS      Moving from lying on back to sitting on the side of a flat bed without bedrails? 4  -LS      Moving to and from a bed to a chair (including a wheelchair)? 4  -LS      Standing up from a chair using your arms (e.g., wheelchair, bedside chair)? 4  -LS      Climbing 3-5 steps with a railing? 4  -LS      To walk in hospital room? 4  -LS      AM-PAC 6 Clicks Score 24  -LS      Functional Assessment    Outcome Measure Options AM-PAC 6 Clicks Basic Mobility (PT)  -LS        User Key  (r) = Recorded By, (t) = Taken By, (c) = Cosigned By    Initials Name Provider Type    TINO Taylor PT Physical Therapist           Time Calculation:         PT Charges       09/03/17 1306          Time Calculation    Start Time 1245  -LS      Stop Time 1300  -LS      Time Calculation (min) 15 min  -LS      PT Received On 09/03/17  -LS        User Key  (r) = Recorded By, (t) = Taken By, (c) = Cosigned By    Initials Name Provider Type    TINO Taylor  PT Physical Therapist          Therapy Charges for Today     Code Description Service Date Service Provider Modifiers Qty    81854815886 HC PT MOBILITY PROJECTED 9/3/2017 Mirna Taylor, PT GP, CH 1    68665724537 HC PT MOBILITY DISCHARGE 9/3/2017 Mirna Taylor, PT GP,  1    87293253242 HC PT EVAL LOW COMPLEXITY 1 9/3/2017 Mirna Taylor, PT GP 1    55817756206 HC PT THER PROC EA 15 MIN 9/3/2017 Mirna Taylor, PT GP 1          PT G-Codes  PT Professional Judgement Used?: Yes  Outcome Measure Options: AM-PAC 6 Clicks Basic Mobility (PT)  Score: 24  Functional Limitation: Mobility: Walking and moving around  Mobility: Walking and Moving Around Goal Status (): 0 percent impaired, limited or restricted  Mobility: Walking and Moving Around Discharge Status (): 0 percent impaired, limited or restricted    PT Discharge Summary  Anticipated Discharge Disposition: home  Reason for Discharge: Independent  Outcomes Achieved: Discharge from facility occurred on same date as evluation  Discharge Destination: Home    Mirna Taylor PT  9/3/2017

## 2017-09-03 NOTE — PLAN OF CARE
Problem: Patient Care Overview (Adult)  Goal: Plan of Care Review  Outcome: Ongoing (interventions implemented as appropriate)    09/03/17 0621   Coping/Psychosocial Response Interventions   Plan Of Care Reviewed With patient   Patient Care Overview   Progress no change   Outcome Evaluation   Outcome Summary/Follow up Plan PT denies pain, or soa, states HA improved, Right right cath site drsg CDI, good pulse in right arm, VVS, rested well throughout night       Goal: Discharge Needs Assessment  Outcome: Ongoing (interventions implemented as appropriate)    Problem: Pain, Acute (Adult)  Goal: Acceptable Pain Control/Comfort Level  Outcome: Ongoing (interventions implemented as appropriate)    Problem: Fall Risk (Adult)  Goal: Absence of Falls  Outcome: Ongoing (interventions implemented as appropriate)    Problem: Cardiac Output, Decreased (Adult)  Goal: Adequate Cardiac Output/Effective Tissue Perfusion  Outcome: Ongoing (interventions implemented as appropriate)

## 2017-09-05 NOTE — PROGRESS NOTES
Case Management Discharge Note    Final Note: Home no additional dc orders noted. Fern RN/CCP    Discharge Placement     No information found             Discharge Codes: 01  Discharge to home

## 2017-09-25 ENCOUNTER — OFFICE VISIT (OUTPATIENT)
Dept: FAMILY MEDICINE CLINIC | Facility: CLINIC | Age: 67
End: 2017-09-25

## 2017-09-25 VITALS
SYSTOLIC BLOOD PRESSURE: 120 MMHG | HEIGHT: 64 IN | HEART RATE: 101 BPM | OXYGEN SATURATION: 99 % | WEIGHT: 150.4 LBS | TEMPERATURE: 98.6 F | DIASTOLIC BLOOD PRESSURE: 78 MMHG | BODY MASS INDEX: 25.68 KG/M2

## 2017-09-25 DIAGNOSIS — K21.00 GASTROESOPHAGEAL REFLUX DISEASE WITH ESOPHAGITIS: ICD-10-CM

## 2017-09-25 DIAGNOSIS — M94.0 COSTOCHONDRITIS, ACUTE: Primary | ICD-10-CM

## 2017-09-25 PROCEDURE — 99213 OFFICE O/P EST LOW 20 MIN: CPT | Performed by: FAMILY MEDICINE

## 2017-09-25 RX ORDER — MELOXICAM 15 MG/1
15 TABLET ORAL DAILY
COMMUNITY
End: 2021-03-16

## 2017-09-25 RX ORDER — OMEPRAZOLE 40 MG/1
40 CAPSULE, DELAYED RELEASE ORAL DAILY
Qty: 30 CAPSULE | Refills: 6 | Status: SHIPPED | OUTPATIENT
Start: 2017-09-25 | End: 2021-03-16

## 2017-09-25 NOTE — PROGRESS NOTES
Subjective.../ HPI  I have reviewed the patient's medical history in detail and updated the computerized patient record.    Subjective: Irlanda Henry is a 67 y.o. female presents here today for-f/u hsp stay for chest pain angina ruled out . Senthome on meloxicam with no help. Pain worse at night ,associated with [facility] follow up (chest pain follow up ,pt still has same pain get worse when eats and lying down)      History reviewed. No pertinent family history.    Social History     Social History   • Marital status: Legally      Spouse name: N/A   • Number of children: N/A   • Years of education: N/A     Occupational History   • Not on file.     Social History Main Topics   • Smoking status: Never Smoker   • Smokeless tobacco: Not on file   • Alcohol use Yes      Comment: rare   • Drug use: No   • Sexual activity: Defer     Other Topics Concern   • Not on file     Social History Narrative       Review of Systems   Constitutional: Negative.    HENT: Negative.    Respiratory: Negative.    Cardiovascular: Positive for chest pain.   Gastrointestinal: Negative.    Endocrine: Negative.    Genitourinary: Negative.    Musculoskeletal: Positive for gait problem.   Allergic/Immunologic: Negative.    Hematological: Negative.    Psychiatric/Behavioral: Negative.        Physical Exam   Constitutional: She is oriented to person, place, and time. She appears well-developed and well-nourished.   Cardiovascular: Normal rate, regular rhythm, normal heart sounds and intact distal pulses.    Pulmonary/Chest: Effort normal and breath sounds normal.   Musculoskeletal:   L costochondral jpoint   Neurological: She is alert and oriented to person, place, and time.   Psychiatric: She has a normal mood and affect.   Vitals reviewed.      Procedures    Irlanda was seen today for hospital follow up.    Diagnoses and all orders for this visit:    Costochondritis, acute    Gastroesophageal reflux disease with  esophagitis  -     omeprazole (PRILOSEC) 40 MG capsule; Take 1 capsule by mouth Daily.      Requested Prescriptions     Signed Prescriptions Disp Refills   • omeprazole (PRILOSEC) 40 MG capsule 30 capsule 6     Sig: Take 1 capsule by mouth Daily.       Results Review:    REVIEWED AND DISCUSSED CLINICAL RESULTS WITH PATIENT    Return in about 8 years (around 9/25/2025).

## 2018-08-13 ENCOUNTER — TRANSCRIBE ORDERS (OUTPATIENT)
Dept: ADMINISTRATIVE | Facility: HOSPITAL | Age: 68
End: 2018-08-13

## 2018-08-13 DIAGNOSIS — Z12.31 VISIT FOR SCREENING MAMMOGRAM: Primary | ICD-10-CM

## 2018-08-24 ENCOUNTER — HOSPITAL ENCOUNTER (OUTPATIENT)
Dept: MAMMOGRAPHY | Facility: HOSPITAL | Age: 68
Discharge: HOME | End: 2018-08-24
Attending: FAMILY MEDICINE | Admitting: FAMILY MEDICINE

## 2018-08-24 DIAGNOSIS — Z12.31 VISIT FOR SCREENING MAMMOGRAM: ICD-10-CM

## 2018-08-24 PROCEDURE — 77067 SCR MAMMO BI INCL CAD: CPT

## 2019-04-11 ENCOUNTER — OFFICE VISIT (OUTPATIENT)
Dept: INTERNAL MEDICINE | Facility: CLINIC | Age: 69
End: 2019-04-11

## 2019-04-11 VITALS
HEIGHT: 64 IN | HEART RATE: 87 BPM | TEMPERATURE: 97.8 F | BODY MASS INDEX: 27.49 KG/M2 | OXYGEN SATURATION: 97 % | WEIGHT: 161 LBS | SYSTOLIC BLOOD PRESSURE: 108 MMHG | DIASTOLIC BLOOD PRESSURE: 83 MMHG

## 2019-04-11 DIAGNOSIS — M62.81 POST-POLIO LIMB MUSCLE WEAKNESS: Primary | Chronic | ICD-10-CM

## 2019-04-11 DIAGNOSIS — R53.82 CHRONIC FATIGUE: ICD-10-CM

## 2019-04-11 DIAGNOSIS — M10.041 ACUTE IDIOPATHIC GOUT OF RIGHT HAND: ICD-10-CM

## 2019-04-11 DIAGNOSIS — B91 POST-POLIO LIMB MUSCLE WEAKNESS: Primary | Chronic | ICD-10-CM

## 2019-04-11 PROBLEM — M10.9 GOUT OF HAND: Status: ACTIVE | Noted: 2019-04-11

## 2019-04-11 PROBLEM — M13.851 ALLERGIC ARTHRITIS OF RIGHT HIP: Status: ACTIVE | Noted: 2019-04-11

## 2019-04-11 PROCEDURE — 99213 OFFICE O/P EST LOW 20 MIN: CPT | Performed by: FAMILY MEDICINE

## 2019-04-11 RX ORDER — INDOMETHACIN 50 MG/1
50 CAPSULE ORAL 3 TIMES DAILY PRN
Qty: 30 CAPSULE | Refills: 2 | Status: SHIPPED | OUTPATIENT
Start: 2019-04-11 | End: 2019-04-11 | Stop reason: SDUPTHER

## 2019-04-11 RX ORDER — INDOMETHACIN 50 MG/1
50 CAPSULE ORAL 3 TIMES DAILY PRN
Qty: 30 CAPSULE | Refills: 2 | Status: SHIPPED | OUTPATIENT
Start: 2019-04-11 | End: 2019-04-11

## 2019-04-11 RX ORDER — INDOMETHACIN 50 MG/1
50 CAPSULE ORAL 3 TIMES DAILY PRN
Qty: 30 CAPSULE | Refills: 2 | Status: SHIPPED | OUTPATIENT
Start: 2019-04-11 | End: 2021-03-16

## 2019-04-11 NOTE — PROGRESS NOTES
CC:R 2nd MP joint tender    Subjective.../HPI  Patient present today with1) R 2nd MP jpoint sweeling and tender  2) R>L hip pain due to gout  I have reviewed the patient's medical history in detail and updated the computerized patient record.    Past Medical History:   Diagnosis Date   • Polio    • Vasovagal syncope        Past Surgical History:   Procedure Laterality Date   • CARDIAC CATHETERIZATION N/A 9/2/2017    Procedure: Left Heart Cath;  Surgeon: Jose Murphy MD;  [facility];  Service:    • CARDIAC CATHETERIZATION N/A 9/2/2017    Procedure: Coronary angiography;  Surgeon: Jose Murphy MD;  Location:  [facility] CATH INVASIVE LOCATION;  Service:    • CATARACT EXTRACTION     • CHOLECYSTECTOMY     • LEG SURGERY      multiple surgeries for polio   • RETINAL DETACHMENT REPAIR Left        Family History   Problem Relation Age of Onset   • Breast cancer Maternal Aunt        Social History     Socioeconomic History   • Marital status: Legally      Spouse name: Not on file   • Number of children: Not on file   • Years of education: Not on file   • Highest education level: Not on file   Tobacco Use   • Smoking status: Never Smoker   Substance and Sexual Activity   • Alcohol use: Yes     Comment: rare   • Drug use: No   • Sexual activity: Defer         There is no immunization history on file for this patient.    Review of Systems:   Review of Systems   Constitutional: Negative.    HENT: Negative.    Eyes: Negative.    Respiratory: Negative.    Cardiovascular: Negative.    Gastrointestinal: Negative.    Endocrine: Negative.    Genitourinary: Negative.    Musculoskeletal: Negative.    Skin: Negative.    Allergic/Immunologic: Negative.    Neurological: Negative.    Hematological: Negative.    Psychiatric/Behavioral: Negative.          Physical Exam   Constitutional: She is oriented to person, place, and time. She appears well-developed and well-nourished.   Cardiovascular: Normal  "rate, regular rhythm and normal heart sounds.   Pulmonary/Chest: Effort normal and breath sounds normal.   Neurological: She is alert and oriented to person, place, and time.   Psychiatric: She has a normal mood and affect. Her behavior is normal.   Vitals reviewed.        Vital Signs     Vitals:    04/11/19 1547   BP: 108/83   BP Location: Left arm   Patient Position: Sitting   Cuff Size: Small Adult   Pulse: 87   Temp: 97.8 °F (36.6 °C)   TempSrc: Oral   SpO2: 97%   Weight: 73 kg (161 lb)   Height: 162.6 cm (64\")          Results Review:      REVIEWED AND DISCUSSED CLINICAL RESULTS WITH PATIENT      Requested Prescriptions     Pending Prescriptions Disp Refills   • indomethacin (INDOCIN) 50 MG capsule 30 capsule 2     Sig: Take 1 capsule by mouth 3 (Three) Times a Day As Needed for Mild Pain .         Current Outpatient Medications:   •  meloxicam (MOBIC) 15 MG tablet, Take 15 mg by mouth Daily., Disp: , Rfl:   •  omeprazole (PRILOSEC) 40 MG capsule, Take 1 capsule by mouth Daily., Disp: 30 capsule, Rfl: 6    Procedures          Diagnoses and all orders for this visit:    Post-polio limb muscle weakness    Acute idiopathic gout of right hand    Chronic fatigue        There are no Patient Instructions on file for this visit.     No Follow-up on file.    Calvin Akins M.D  04/11/19  4:39 PM          "

## 2019-04-12 LAB
ALBUMIN SERPL-MCNC: 4.7 G/DL (ref 3.5–5.2)
ALBUMIN/GLOB SERPL: 2 G/DL
ALP SERPL-CCNC: 51 U/L (ref 39–117)
ALT SERPL-CCNC: 27 U/L (ref 1–33)
AST SERPL-CCNC: 24 U/L (ref 1–32)
BASOPHILS # BLD AUTO: 0.03 10*3/MM3 (ref 0–0.2)
BASOPHILS NFR BLD AUTO: 0.5 % (ref 0–1.5)
BILIRUB SERPL-MCNC: 0.3 MG/DL (ref 0.2–1.2)
BUN SERPL-MCNC: 14 MG/DL (ref 8–23)
BUN/CREAT SERPL: 18.7 (ref 7–25)
CALCIUM SERPL-MCNC: 9.7 MG/DL (ref 8.6–10.5)
CHLORIDE SERPL-SCNC: 103 MMOL/L (ref 98–107)
CO2 SERPL-SCNC: 26.6 MMOL/L (ref 22–29)
CREAT SERPL-MCNC: 0.75 MG/DL (ref 0.57–1)
EOSINOPHIL # BLD AUTO: 0.04 10*3/MM3 (ref 0–0.4)
EOSINOPHIL NFR BLD AUTO: 0.6 % (ref 0.3–6.2)
ERYTHROCYTE [DISTWIDTH] IN BLOOD BY AUTOMATED COUNT: 13.2 % (ref 12.3–15.4)
GLOBULIN SER CALC-MCNC: 2.3 GM/DL
GLUCOSE SERPL-MCNC: 93 MG/DL (ref 65–99)
HCT VFR BLD AUTO: 45 % (ref 34–46.6)
HGB BLD-MCNC: 14.3 G/DL (ref 12–15.9)
IMM GRANULOCYTES # BLD AUTO: 0.03 10*3/MM3 (ref 0–0.05)
IMM GRANULOCYTES NFR BLD AUTO: 0.5 % (ref 0–0.5)
LYMPHOCYTES # BLD AUTO: 0.96 10*3/MM3 (ref 0.7–3.1)
LYMPHOCYTES NFR BLD AUTO: 14.8 % (ref 19.6–45.3)
MCH RBC QN AUTO: 30.2 PG (ref 26.6–33)
MCHC RBC AUTO-ENTMCNC: 31.8 G/DL (ref 31.5–35.7)
MCV RBC AUTO: 95.1 FL (ref 79–97)
MONOCYTES # BLD AUTO: 0.56 10*3/MM3 (ref 0.1–0.9)
MONOCYTES NFR BLD AUTO: 8.6 % (ref 5–12)
NEUTROPHILS # BLD AUTO: 4.86 10*3/MM3 (ref 1.4–7)
NEUTROPHILS NFR BLD AUTO: 75 % (ref 42.7–76)
NRBC BLD AUTO-RTO: 0 /100 WBC (ref 0–0)
PLATELET # BLD AUTO: 204 10*3/MM3 (ref 140–450)
POTASSIUM SERPL-SCNC: 4.3 MMOL/L (ref 3.5–5.2)
PROT SERPL-MCNC: 7 G/DL (ref 6–8.5)
RBC # BLD AUTO: 4.73 10*6/MM3 (ref 3.77–5.28)
SODIUM SERPL-SCNC: 142 MMOL/L (ref 136–145)
TSH SERPL DL<=0.005 MIU/L-ACNC: 2.94 MIU/ML (ref 0.27–4.2)
URATE SERPL-MCNC: 4.4 MG/DL (ref 2.4–5.7)
WBC # BLD AUTO: 6.48 10*3/MM3 (ref 3.4–10.8)

## 2019-04-16 ENCOUNTER — TELEPHONE (OUTPATIENT)
Dept: INTERNAL MEDICINE | Facility: CLINIC | Age: 69
End: 2019-04-16

## 2019-04-18 DIAGNOSIS — R51.9 PERSISTENT HEADACHES: Primary | ICD-10-CM

## 2019-04-26 ENCOUNTER — HOSPITAL ENCOUNTER (OUTPATIENT)
Dept: CT IMAGING | Facility: HOSPITAL | Age: 69
Discharge: HOME | End: 2019-04-26
Admitting: FAMILY MEDICINE

## 2019-04-26 ENCOUNTER — HOSPITAL ENCOUNTER (OUTPATIENT)
Dept: GENERAL RADIOLOGY | Facility: HOSPITAL | Age: 69
Discharge: HOME | End: 2019-04-26

## 2019-04-26 DIAGNOSIS — M62.81 POST-POLIO LIMB MUSCLE WEAKNESS: Chronic | ICD-10-CM

## 2019-04-26 DIAGNOSIS — R51.9 PERSISTENT HEADACHES: ICD-10-CM

## 2019-04-26 DIAGNOSIS — B91 POST-POLIO LIMB MUSCLE WEAKNESS: Chronic | ICD-10-CM

## 2019-04-26 PROCEDURE — 70450 CT HEAD/BRAIN W/O DYE: CPT

## 2019-04-26 PROCEDURE — 73521 X-RAY EXAM HIPS BI 2 VIEWS: CPT

## 2019-08-30 DIAGNOSIS — M62.81 POST-POLIO LIMB MUSCLE WEAKNESS: Primary | Chronic | ICD-10-CM

## 2019-08-30 DIAGNOSIS — B91 POST-POLIO LIMB MUSCLE WEAKNESS: Primary | Chronic | ICD-10-CM

## 2019-08-30 DIAGNOSIS — M25.551 CHRONIC HIP PAIN, BILATERAL: ICD-10-CM

## 2019-08-30 DIAGNOSIS — M25.552 CHRONIC HIP PAIN, BILATERAL: ICD-10-CM

## 2019-08-30 DIAGNOSIS — G89.29 CHRONIC HIP PAIN, BILATERAL: ICD-10-CM

## 2019-08-30 NOTE — PROGRESS NOTES
Received a call from answering service from patient regarding xrays she had done of her bilateral hips in April 2019. She reports that Dr. Akins told her she should be referred to orthopedic surgery and he suggested Dr. David Mcgill, based upon findings. I looked back and it does not look like a referral was entered. I placed an order for orthopedic surgery referral for Dr. Mcgill.

## 2019-10-22 ENCOUNTER — TRANSCRIBE ORDERS (OUTPATIENT)
Dept: ADMINISTRATIVE | Facility: HOSPITAL | Age: 69
End: 2019-10-22

## 2019-10-22 DIAGNOSIS — Z12.31 SCREENING MAMMOGRAM, ENCOUNTER FOR: Primary | ICD-10-CM

## 2019-11-01 ENCOUNTER — CONSULT (OUTPATIENT)
Dept: ORTHOPEDIC SURGERY | Facility: CLINIC | Age: 69
End: 2019-11-01

## 2019-11-01 VITALS — TEMPERATURE: 98.2 F | HEIGHT: 65 IN | WEIGHT: 160 LBS | BODY MASS INDEX: 26.66 KG/M2

## 2019-11-01 DIAGNOSIS — M16.11 PRIMARY OSTEOARTHRITIS OF RIGHT HIP: Primary | ICD-10-CM

## 2019-11-01 PROCEDURE — 99204 OFFICE O/P NEW MOD 45 MIN: CPT | Performed by: ORTHOPAEDIC SURGERY

## 2019-11-01 NOTE — PROGRESS NOTES
"Patient: Irlanda Henry  YOB: 1950 69 y.o. female  Medical Record Number: [id number]    Chief Complaints:   Chief Complaint   Patient presents with   • Right Hip - Pain   • Left Hip - Pain   • Consult       History of Present Illness:Irlanda Henry is a 69 y.o. female who presents with a history of polio and postpolio syndrome she has had marketed right lower extremity weakness and uses a drop lock knee brace to assist with ambulation.  She has developed severe pain within the right hip region over the last few years.  The pain has begun to limit her basic activities.  She saw Dr. Lockhart for evaluation and is here for another opinion.    Allergies: No Known Allergies    Medications:   Current Outpatient Medications   Medication Sig Dispense Refill   • indomethacin (INDOCIN) 50 MG capsule Take 1 capsule by mouth 3 (Three) Times a Day As Needed for Mild Pain . 30 capsule 2   • meloxicam (MOBIC) 15 MG tablet Take 15 mg by mouth Daily.     • omeprazole (PRILOSEC) 40 MG capsule Take 1 capsule by mouth Daily. 30 capsule 6     No current facility-administered medications for this visit.          The following portions of the patient's history were reviewed and updated as appropriate: allergies, current medications, past family history, past medical history, past social history, past surgical history and problem list.    Review of Systems:   A 14 point review of systems was performed. All systems negative except pertinent positives/negative listed in HPI above    Physical Exam:   Vitals:    11/01/19 1519   Temp: 98.2 °F (36.8 °C)   Weight: 72.6 kg (160 lb)   Height: 165.1 cm (65\")       General: A and O x 3, ASA, NAD    SCLERA:    Normal    DENTITION:   Normal  Chest clear to auscultation  Heart regular rate  There is right hip irritability with range of motion with flexion internal rotation it appears that she has subluxation of the right hip which is painful.  She has marketed hip flexion and abductor " weakness would rate her strength is 2 out of 5.  She walks with a markedly antalgic gait with evidence of severe abductor and hip flexor deficiency secondary to her post polio right lower extremity weakness.  Left hip shows a mildly positive Stinchfield test hip flexion abduction strength are 4+ to 5 out of 5.  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  There is market valgus deformity.  The acetabulum is somewhat deficient and the femoral head is not located centrally within the acetabulum.  The left hip shows moderate to moderately severe osteoarthritis with osteophyte formation.  There are no previous films or comparison.    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  She has considerable muscular weakness she has degenerative change and she has a market gait abnormality.  We had a long discussion about the constellation of issues. I am going to send her for an MRI to evaluate the hip musculature and also will perform a cortisone injection into the hip joint I want to see how she responds that if she is no better it is likely not worth considering any surgical intervention.  If she is considerably better then we will have further discussions about what surgery would entail.  We had some initial discussion that this surgery may be possible with new implants such as a readapt stem and readapt cup with dual mobility head although I could not guarantee her any specific result given the unusual nature and the severity combined with marketed gait abnormality and muscle weakness.  She does understand that should any surgery be entertained it would carry with it significant risk of fracture dislocation neurovascular injury leg length inequality premature wear need for  further surgery as well as all other risk of surgery which would be elevated in her.  I like to see her back after the injection and MRI for further discussion.      David Mcgill MD  11/1/2019

## 2019-11-22 ENCOUNTER — HOSPITAL ENCOUNTER (OUTPATIENT)
Dept: MAMMOGRAPHY | Facility: HOSPITAL | Age: 69
Discharge: HOME | End: 2019-11-22
Admitting: FAMILY MEDICINE

## 2019-11-22 DIAGNOSIS — Z12.31 SCREENING MAMMOGRAM, ENCOUNTER FOR: ICD-10-CM

## 2019-11-22 PROCEDURE — 77067 SCR MAMMO BI INCL CAD: CPT

## 2019-11-22 PROCEDURE — 77063 BREAST TOMOSYNTHESIS BI: CPT

## 2019-12-02 ENCOUNTER — HOSPITAL ENCOUNTER (OUTPATIENT)
Dept: GENERAL RADIOLOGY | Facility: HOSPITAL | Age: 69
Discharge: HOME | End: 2019-12-02
Admitting: ORTHOPAEDIC SURGERY

## 2019-12-02 ENCOUNTER — TELEPHONE (OUTPATIENT)
Dept: ORTHOPEDIC SURGERY | Facility: CLINIC | Age: 69
End: 2019-12-02

## 2019-12-02 ENCOUNTER — HOSPITAL ENCOUNTER (OUTPATIENT)
Dept: MRI IMAGING | Facility: HOSPITAL | Age: 69
Discharge: HOME | End: 2019-12-02

## 2019-12-02 DIAGNOSIS — M16.11 PRIMARY OSTEOARTHRITIS OF RIGHT HIP: ICD-10-CM

## 2019-12-02 PROCEDURE — 73721 MRI JNT OF LWR EXTRE W/O DYE: CPT

## 2019-12-02 RX ORDER — METHYLPREDNISOLONE ACETATE 40 MG/ML
80 INJECTION, SUSPENSION INTRA-ARTICULAR; INTRALESIONAL; INTRAMUSCULAR; SOFT TISSUE ONCE
Status: DISCONTINUED | OUTPATIENT
Start: 2019-12-02 | End: 2019-12-02

## 2019-12-02 RX ORDER — BUPIVACAINE HYDROCHLORIDE 2.5 MG/ML
10 INJECTION, SOLUTION EPIDURAL; INFILTRATION; INTRACAUDAL ONCE
Status: DISCONTINUED | OUTPATIENT
Start: 2019-12-02 | End: 2019-12-02

## 2019-12-02 RX ORDER — LIDOCAINE HYDROCHLORIDE 10 MG/ML
10 INJECTION, SOLUTION INFILTRATION; PERINEURAL ONCE
Status: DISCONTINUED | OUTPATIENT
Start: 2019-12-02 | End: 2019-12-02

## 2019-12-02 NOTE — TELEPHONE ENCOUNTER
Advised her she was sent for the injection because she was seen for pain in her hip. If she is not having pain and refuses to do the injection that is her right. She can schedule a F/U to discuss with RBB. cld

## 2019-12-13 ENCOUNTER — OFFICE VISIT (OUTPATIENT)
Dept: ORTHOPEDIC SURGERY | Facility: CLINIC | Age: 69
End: 2019-12-13

## 2019-12-13 VITALS — TEMPERATURE: 98.3 F | WEIGHT: 160 LBS | HEIGHT: 65 IN | BODY MASS INDEX: 26.66 KG/M2

## 2019-12-13 DIAGNOSIS — M16.11 PRIMARY OSTEOARTHRITIS OF RIGHT HIP: Primary | ICD-10-CM

## 2019-12-13 PROCEDURE — 99213 OFFICE O/P EST LOW 20 MIN: CPT | Performed by: ORTHOPAEDIC SURGERY

## 2019-12-13 NOTE — PROGRESS NOTES
"Patient: Irlanda Henry  YOB: 1950 69 y.o. female  Medical Record Number: [id number]     Chief Complaints:       Chief Complaint   Patient presents with   • Right Hip - Pain   • Left Hip - Pain   • Consult         History of Present Illness:Irlanda Henry is a 69 y.o. female who presents with a history of polio and postpolio syndrome she has had marketed right lower extremity weakness and uses a drop lock knee brace to assist with ambulation.  She has developed intermittent pain within the right hip region over the last few years but her mjor issue is weakness and gait abnormality. She had an MRI for further evaluation and is her for discussion. Has psoterior buttock and LBP.   Allergies: No Known Allergies     Medications:          Current Outpatient Medications   Medication Sig Dispense Refill   • indomethacin (INDOCIN) 50 MG capsule Take 1 capsule by mouth 3 (Three) Times a Day As Needed for Mild Pain . 30 capsule 2   • meloxicam (MOBIC) 15 MG tablet Take 15 mg by mouth Daily.       • omeprazole (PRILOSEC) 40 MG capsule Take 1 capsule by mouth Daily. 30 capsule 6      No current facility-administered medications for this visit.             The following portions of the patient's history were reviewed and updated as appropriate: allergies, current medications, past family history, past medical history, past social history, past surgical history and problem list.     Review of Systems:   A 14 point review of systems was performed. All systems negative except pertinent positives/negative listed in HPI above     Physical Exam:       Vitals:     11/01/19 1519   Temp: 98.2 °F (36.8 °C)   Weight: 72.6 kg (160 lb)   Height: 165.1 cm (65\")         General: A and O x 3, ASA, NAD                          SCLERA:    Normal                          DENTITION:   Normal  Chest clear to auscultation  Heart regular rate  There is right hip irritability with range of motion with flexion internal rotation it " appears that she has subluxation of the right hip which is painful.  She has marketed hip flexion and abductor weakness would rate her strength is 2 out of 5.  She walks with a markedly antalgic gait with evidence of severe abductor and hip flexor deficiency secondary to her post polio right lower extremity weakness.  Left hip shows a mildly positive Stinchfield test hip flexion abduction strength are 4+ to 5 out of 5.  She is intact light touch with palpable distal pulses                    Radiology:  Xrays 2views both hips (AP bilateral hips, and lateral of the hip) taken at an [facility] institution were reviewed  demonstrating dysplasia of the right femur consistent with post polio syndrome.  There is market valgus deformity.  The acetabulum is somewhat deficient and the femoral head is not located centrally within the acetabulum.  The left hip shows moderate to moderately severe osteoarthritis with osteophyte formation.  There are no previous films or comparison.    Recent hip MRI:  Patient: Irlanda Henry  YOB: 1950 69 y.o. female  Medical Record Number: [id number]     Chief Complaints:       Chief Complaint   Patient presents with   • Right Hip - Pain   • Left Hip - Pain   • Consult         History of Present Illness:Irlanda Henry is a 69 y.o. female who presents with a history of polio and postpolio syndrome she has had marketed right lower extremity weakness and uses a drop lock knee brace to assist with ambulation.  She has developed severe pain within the right hip region over the last few years.  The pain has begun to limit her basic activities.  She saw Dr. Lockhart for evaluation and is here for another opinion.     Allergies: No Known Allergies     Medications:          Current Outpatient Medications   Medication Sig Dispense Refill   • indomethacin (INDOCIN) 50 MG capsule Take 1 capsule by mouth 3 (Three) Times a Day As Needed for Mild Pain . 30 capsule 2   • meloxicam (MOBIC) 15 MG  "tablet Take 15 mg by mouth Daily.       • omeprazole (PRILOSEC) 40 MG capsule Take 1 capsule by mouth Daily. 30 capsule 6      No current facility-administered medications for this visit.             The following portions of the patient's history were reviewed and updated as appropriate: allergies, current medications, past family history, past medical history, past social history, past surgical history and problem list.     Review of Systems:   A 14 point review of systems was performed. All systems negative except pertinent positives/negative listed in HPI above     Physical Exam:       Vitals:     11/01/19 1519   Temp: 98.2 °F (36.8 °C)   Weight: 72.6 kg (160 lb)   Height: 165.1 cm (65\")         General: A and O x 3, ASA, NAD                          SCLERA:    Normal                          DENTITION:   Normal  Chest clear to auscultation  Heart regular rate  There is right hip irritability with range of motion with flexion internal rotation it appears that she has subluxation of the right hip which is painful.  She has marketed hip flexion and abductor weakness would rate her strength is 2 out of 5.  She walks with a markedly antalgic gait with evidence of severe abductor and hip flexor deficiency secondary to her post polio right lower extremity weakness.  Left hip shows a mildly positive Stinchfield test hip flexion abduction strength are 4+ to 5 out of 5.  She is intact light touch with palpable distal pulses                    Radiology:  Xrays 2views both hips (AP bilateral hips, and lateral of the hip) taken at an [facility] institution were reviewed  demonstrating dysplasia of the right femur consistent with post polio syndrome.  There is market valgus deformity.  The acetabulum is somewhat deficient and the femoral head is not located centrally within the acetabulum.  The left hip shows moderate to moderately severe osteoarthritis with osteophyte formation.  There are no previous films or " comparison.     Mri hip :  IMPRESSION:  Diffuse fatty atrophy of musculature around the right hip  and hemipelvis with dysplastic change in the bony structures of the  right hip where there is also osteoarthritic change. Musculature around  the left hip and left hemipelvis appears normal. There is also  osteoarthritic change at the left hip.     This report was finalized on 12/2/2019 3:19 PM by Dr. Ricki Lewis M.D.       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  She has considerable muscular weakness she has degenerative change and she has a market gait abnormality.  We had a long discussion about the constellation of issues. Given her skeletal anatomy aand muscular atrophy I am recommending against DAGOBERTO-  I recommend continue PT and bracing as needed. RTO PRN.

## 2020-03-19 ENCOUNTER — TELEPHONE (OUTPATIENT)
Dept: ORTHOPEDIC SURGERY | Facility: CLINIC | Age: 70
End: 2020-03-19

## 2020-03-19 ENCOUNTER — TELEPHONE (OUTPATIENT)
Dept: INTERNAL MEDICINE | Facility: CLINIC | Age: 70
End: 2020-03-19

## 2020-03-19 NOTE — TELEPHONE ENCOUNTER
Patient called on 03/19/2020 to office she is a former patient of Dr Calvin Akins's and has not established with you yet the problem is patient had  polio and has a brace a bar of the brace has broke she would like us to send a prescription to [facility] for brace repair but  is not sure that she may need a new brace the phone number to [facility] is [phone] We have given her an appointment on 05/22/2020 Friday at 3:00pm with you to establish

## 2020-03-19 NOTE — TELEPHONE ENCOUNTER
PATIENT WEARS A BRACE ON HER LEFT LEG, YOU DID NOT ORDER THIS. SAID IT BROKE AND SHE WANTS YOU TO GIVE HER AN ORDER TO HAVE IT FIXED? PLEASE ADVISE

## 2020-03-20 NOTE — TELEPHONE ENCOUNTER
Contact patient and have her to have brace evaluated first at [facility] to see what they need and if it needs to be repaired or replaced.      It is ok to have order for either prior to being seen by me as she was established with Dr PETE GUPTA

## 2020-03-20 NOTE — TELEPHONE ENCOUNTER
Can you please write order that states R long leg brace needs to be repaired and sign so I can fax to [facility].

## 2020-08-25 ENCOUNTER — TELEPHONE (OUTPATIENT)
Dept: INTERNAL MEDICINE | Facility: CLINIC | Age: 70
End: 2020-08-25

## 2020-08-25 NOTE — TELEPHONE ENCOUNTER
[facility] IS CALLING FOR PATIENT TO GET A NEW SCRIPT TO BE WRITTEN FOR A RIGHT LONG LEG BRACE. PATIENT HAD A SCRIPT WRITTEN IN MARCH, AND WAS UNABLE TO GET IT FILLED.    PLEASE CALL [facility] AND FAX PRESCRIPTION -[phone]

## 2020-08-26 NOTE — TELEPHONE ENCOUNTER
[facility] informed pt has not been seen since 4/2019 and we cannot give orders.  They will let pt know.

## 2020-10-23 ENCOUNTER — TELEPHONE (OUTPATIENT)
Dept: ORTHOPEDIC SURGERY | Facility: CLINIC | Age: 70
End: 2020-10-23

## 2020-10-23 NOTE — TELEPHONE ENCOUNTER
Pt attempted to get rx from Dr Akins but he retired and she was told they would not give order unless she was seen again. Pt is very upset because if she doesn't get new rx she will be unable to get brace repaired. Please advise

## 2020-10-26 NOTE — TELEPHONE ENCOUNTER
Okay to write new prescription for right long leg brace for brace repair for mechanical instability.  I can sign it at [facility] tomorrow.

## 2020-10-27 NOTE — TELEPHONE ENCOUNTER
Order faxed to [facility], attempted to contact pt to let her know it had been faxed but call would not go through

## 2021-03-16 ENCOUNTER — OFFICE VISIT (OUTPATIENT)
Dept: FAMILY MEDICINE CLINIC | Facility: CLINIC | Age: 71
End: 2021-03-16

## 2021-03-16 VITALS
BODY MASS INDEX: 28.66 KG/M2 | WEIGHT: 172 LBS | DIASTOLIC BLOOD PRESSURE: 70 MMHG | RESPIRATION RATE: 16 BRPM | TEMPERATURE: 97.4 F | OXYGEN SATURATION: 99 % | HEIGHT: 65 IN | SYSTOLIC BLOOD PRESSURE: 120 MMHG | HEART RATE: 73 BPM

## 2021-03-16 DIAGNOSIS — Z12.31 ENCOUNTER FOR SCREENING MAMMOGRAM FOR BREAST CANCER: Primary | ICD-10-CM

## 2021-03-16 DIAGNOSIS — A80.9 POLIO: ICD-10-CM

## 2021-03-16 PROCEDURE — 99203 OFFICE O/P NEW LOW 30 MIN: CPT | Performed by: INTERNAL MEDICINE

## 2021-03-17 LAB
ALBUMIN SERPL-MCNC: 4 G/DL (ref 3.8–4.8)
ALBUMIN/GLOB SERPL: 2 {RATIO} (ref 1.2–2.2)
ALP SERPL-CCNC: 46 IU/L (ref 39–117)
ALT SERPL-CCNC: 23 IU/L (ref 0–32)
AST SERPL-CCNC: 21 IU/L (ref 0–40)
BASOPHILS # BLD AUTO: 0 X10E3/UL (ref 0–0.2)
BASOPHILS NFR BLD AUTO: 1 %
BILIRUB SERPL-MCNC: 0.3 MG/DL (ref 0–1.2)
BUN SERPL-MCNC: 18 MG/DL (ref 8–27)
BUN/CREAT SERPL: 31 (ref 12–28)
CALCIUM SERPL-MCNC: 8.9 MG/DL (ref 8.7–10.3)
CHLORIDE SERPL-SCNC: 107 MMOL/L (ref 96–106)
CO2 SERPL-SCNC: 21 MMOL/L (ref 20–29)
CREAT SERPL-MCNC: 0.59 MG/DL (ref 0.57–1)
EOSINOPHIL # BLD AUTO: 0.1 X10E3/UL (ref 0–0.4)
EOSINOPHIL NFR BLD AUTO: 1 %
ERYTHROCYTE [DISTWIDTH] IN BLOOD BY AUTOMATED COUNT: 12.8 % (ref 11.7–15.4)
GLOBULIN SER CALC-MCNC: 2 G/DL (ref 1.5–4.5)
GLUCOSE SERPL-MCNC: ABNORMAL MG/DL
HCT VFR BLD AUTO: 41.1 % (ref 34–46.6)
HGB BLD-MCNC: 13.6 G/DL (ref 11.1–15.9)
IMM GRANULOCYTES # BLD AUTO: 0 X10E3/UL (ref 0–0.1)
IMM GRANULOCYTES NFR BLD AUTO: 1 %
LYMPHOCYTES # BLD AUTO: 0.8 X10E3/UL (ref 0.7–3.1)
LYMPHOCYTES NFR BLD AUTO: 17 %
MCH RBC QN AUTO: 30.2 PG (ref 26.6–33)
MCHC RBC AUTO-ENTMCNC: 33.1 G/DL (ref 31.5–35.7)
MCV RBC AUTO: 91 FL (ref 79–97)
MONOCYTES # BLD AUTO: 0.4 X10E3/UL (ref 0.1–0.9)
MONOCYTES NFR BLD AUTO: 10 %
NEUTROPHILS # BLD AUTO: 3 X10E3/UL (ref 1.4–7)
NEUTROPHILS NFR BLD AUTO: 70 %
PLATELET # BLD AUTO: 210 X10E3/UL (ref 150–450)
POTASSIUM SERPL-SCNC: ABNORMAL MMOL/L
PROT SERPL-MCNC: 6 G/DL (ref 6–8.5)
RBC # BLD AUTO: 4.51 X10E6/UL (ref 3.77–5.28)
SODIUM SERPL-SCNC: 143 MMOL/L (ref 134–144)
WBC # BLD AUTO: 4.3 X10E3/UL (ref 3.4–10.8)

## 2021-03-27 ENCOUNTER — HOSPITAL ENCOUNTER (OUTPATIENT)
Dept: MAMMOGRAPHY | Facility: HOSPITAL | Age: 71
Discharge: HOME | End: 2021-03-27

## 2021-03-27 DIAGNOSIS — Z12.31 ENCOUNTER FOR SCREENING MAMMOGRAM FOR BREAST CANCER: ICD-10-CM

## 2021-03-29 ENCOUNTER — TELEPHONE (OUTPATIENT)
Dept: FAMILY MEDICINE CLINIC | Facility: CLINIC | Age: 71
End: 2021-03-29

## 2021-03-30 DIAGNOSIS — N63.0 BREAST MASS: Primary | ICD-10-CM

## 2021-04-12 NOTE — PROGRESS NOTES
03/16/2021    CC: Breast Pain (left breast.  EST CARE)  .        HPI  Breast Pain  This is a new problem. The problem occurs intermittently. The problem has been waxing and waning. Nothing aggravates the symptoms. She has tried nothing for the symptoms.        Subjective   Irlanda Henry is a 70 y.o. female.      The following portions of the patient's history were reviewed and updated as appropriate: allergies, current medications, past family history, past medical history, past social history, past surgical history and problem list.    Problem List  Patient Active Problem List   Diagnosis   • Chest pain   • Costochondritis, acute   • Post-polio limb muscle weakness   • Gastroesophageal reflux disease with esophagitis   • Allergic arthritis of right hip   • Gout of hand   • Chronic fatigue       Past Medical History  Past Medical History:   Diagnosis Date   • Polio    • Vasovagal syncope        Surgical History  Past Surgical History:   Procedure Laterality Date   • CARDIAC CATHETERIZATION N/A 9/2/2017    Procedure: Left Heart Cath;  Surgeon: Jose Murphy MD;  Location: [facility] INVASIVE LOCATION;  Service:    • CARDIAC CATHETERIZATION N/A 9/2/2017    Procedure: Coronary angiography;  Surgeon: Jose Murphy MD;  Location: [facility] INVASIVE LOCATION;  Service:    • CATARACT EXTRACTION     • CHOLECYSTECTOMY     • LEG SURGERY      multiple surgeries for polio   • RETINAL DETACHMENT REPAIR Left        Family History  Family History   Problem Relation Age of Onset   • Breast cancer Maternal Aunt        Social History  Social History    Tobacco Use      Smoking status: Never Smoker      Smokeless tobacco: Never Used       Is the Patient a current tobacco user? No    Allergies  No Known Allergies    Current Medications  No current outpatient medications on file.     Review of System  Review of Systems   Eyes: Negative.    Respiratory: Negative.    Cardiovascular: Negative.    Gastrointestinal: Negative.     Endocrine: Negative.    Genitourinary: Positive for breast pain. Negative for breast discharge and breast lump.     I have reviewed and confirmed the accuracy of the ROS as documented by the MA/LPN/RN Sathish Coker MD    Vitals:    03/16/21 1302   BP: 120/70   Pulse: 73   Resp: 16   Temp: 97.4 °F (36.3 °C)   SpO2: 99%     Body mass index is 28.62 kg/m².    Objective     Physical Exam  Physical Exam  Exam conducted with a chaperone present.   Constitutional:       Appearance: Normal appearance.   Cardiovascular:      Rate and Rhythm: Normal rate and regular rhythm.      Pulses: Normal pulses.      Heart sounds: Normal heart sounds.   Pulmonary:      Effort: Pulmonary effort is normal.      Breath sounds: Normal breath sounds.   Chest:      Chest wall: No mass, lacerations, deformity, swelling, tenderness, crepitus or edema.      Breasts:         Right: No swelling, bleeding, inverted nipple, mass, nipple discharge, skin change or tenderness.         Left: No swelling, bleeding, inverted nipple, mass, nipple discharge or tenderness.   Abdominal:      General: Abdomen is flat.      Palpations: Abdomen is soft.   Musculoskeletal:         General: Deformity present.      Cervical back: Normal range of motion and neck supple.   Lymphadenopathy:      Upper Body:      Right upper body: No supraclavicular, axillary or pectoral adenopathy.      Left upper body: No supraclavicular, axillary or pectoral adenopathy.   Neurological:      Mental Status: She is alert.         Assessment/Plan      This patient presented requesting a mammogram be done as she's been having pain in her left breast off and on for the past several days  She denies any trauma to the breast and she's not had any lump or mass appreciated on self examination.    She's been seen in the past by Dr. Barreto who has   retired.  She last saw him 2 years or so ago.  She relates she has not seen a gynecologist or primary care doctor in at least 2 years but  would like to see  Dr. Clayton,gynecologist    She has a past history of poliomyelitis and relates that she needs a new brace and therefore needs to see Dr. Mcgill her orthopedic doctor.    Breast exam was unremarkable.    The patient will be referred to Dr. Mcgill orthopedic surgeon for dispensation of brace for the right lower extremity.  Referral will also be made to Dr. Mejia for breast examination.  Will order mammogram as she awaits visit to Dr. Clayton.    We'll schedule her for a physical examination in the next several weeks..  Diagnoses and all orders for this visit:    1. Encounter for screening mammogram for breast cancer (Primary)  -     Cancel: Mammo Screening Bilateral With CAD; Future    2. Polio  -     Comprehensive Metabolic Panel  -     Cancel: Urinalysis With Culture If Indicated -  -     CBC & Differential             Sathish Coker MD  03/16/2021

## 2021-04-29 PROBLEM — E55.9 AVITAMINOSIS D: Status: ACTIVE | Noted: 2021-04-29

## 2021-04-29 PROBLEM — E04.9 NODULAR GOITER, NON-TOXIC: Status: ACTIVE | Noted: 2021-04-29

## 2021-04-29 PROBLEM — E06.9 THYROIDITIS: Status: ACTIVE | Noted: 2021-04-29

## 2021-06-03 DIAGNOSIS — R92.8 ABNORMALITY OF BREAST ON SCREENING MAMMOGRAPHY: Primary | ICD-10-CM

## 2021-06-14 DIAGNOSIS — N64.9 BREAST LESION: Primary | ICD-10-CM

## 2021-06-23 ENCOUNTER — HOSPITAL ENCOUNTER (OUTPATIENT)
Dept: ULTRASOUND IMAGING | Facility: HOSPITAL | Age: 71
Discharge: HOME | End: 2021-06-23

## 2021-06-23 ENCOUNTER — HOSPITAL ENCOUNTER (OUTPATIENT)
Dept: MAMMOGRAPHY | Facility: HOSPITAL | Age: 71
Discharge: HOME | End: 2021-06-23

## 2021-06-23 DIAGNOSIS — R92.8 ABNORMALITY OF BREAST ON SCREENING MAMMOGRAPHY: ICD-10-CM

## 2021-06-23 DIAGNOSIS — N63.0 BREAST MASS: ICD-10-CM

## 2021-06-23 PROCEDURE — 76642 ULTRASOUND BREAST LIMITED: CPT

## 2021-06-23 PROCEDURE — G0279 TOMOSYNTHESIS, MAMMO: HCPCS

## 2021-06-23 PROCEDURE — 77066 DX MAMMO INCL CAD BI: CPT

## 2021-07-26 ENCOUNTER — TELEPHONE (OUTPATIENT)
Dept: ORTHOPEDIC SURGERY | Facility: CLINIC | Age: 71
End: 2021-07-26

## 2021-07-26 DIAGNOSIS — M62.81 POST-POLIO LIMB MUSCLE WEAKNESS: ICD-10-CM

## 2021-07-26 DIAGNOSIS — M16.11 PRIMARY OSTEOARTHRITIS OF RIGHT HIP: Primary | ICD-10-CM

## 2021-07-26 DIAGNOSIS — B91 POST-POLIO LIMB MUSCLE WEAKNESS: ICD-10-CM

## 2021-07-26 NOTE — TELEPHONE ENCOUNTER
Caller: NAHEED LAMBERT    Relationship: SELF    Best call back number: *      Any additional details: WHEN PATIENT LAST SAW DR SON HE GAVE THE PATIENT A HAND WRITTEN SCRIPT (PER PATIENT NOT ACTUALLY A PRESCRIPTION) AND ADVISED HER TO SEE DR BURRELL FOR HER POLIO.  PATIENT CALLED DR MAN OFFICE TODAY AND WAS ADVISED DR SON NEEDS TO SEND A REFERRAL BEFORE THEY CAN SCHEDULE HER.  PT REQUEST A CALL BACK, ASAP.

## 2021-08-02 ENCOUNTER — TELEPHONE (OUTPATIENT)
Dept: ORTHOPEDIC SURGERY | Facility: CLINIC | Age: 71
End: 2021-08-02

## 2021-08-02 NOTE — TELEPHONE ENCOUNTER
Caller: RICKY MANE    Relationship to patient:     Best call back number: [phone]    Additional notes: PATIENT WAS REFERRED TO DR HUMMEL AND THE APPT IS OCT 4 AT 3:00PM

## 2021-08-04 ENCOUNTER — TELEPHONE (OUTPATIENT)
Dept: INTERNAL MEDICINE | Facility: CLINIC | Age: 71
End: 2021-08-04

## 2021-08-04 NOTE — TELEPHONE ENCOUNTER
Patient was informed that Dr. Robins is not taking any patients. She stated she was a previous patient of Dr. Akins. However, she understands Dr. Robins is not seeing any new patients.    She is scheduled see Avinash as a new patient on 9/14/2021

## 2021-08-04 NOTE — TELEPHONE ENCOUNTER
Caller: Irlanda Henry    Relationship to patient: Self    Best call back number: [phone]    Chief complaint: NEW PATIENT, ESTABLISH CARE, POLIO CONCERNS    Type of visit: NEW PATIENT OV    Requested date:  ANY DAY IN THE AFTERNOON    If rescheduling, when is the original appointment: 9/14/2021    Additional notes:PATIENT WOULD LIKE TO SCHEDULE A NEW PATIENT VISIT WITH DR SANTOS. CAM ADVISED HE IS NOT TAKING NEW PATIENTS BUT REQUESTED A MESSAGE BE SENT BACK. SHE STATES SHE WAS A PREVIOUS DR. HENRY PATIENT.

## 2021-09-14 ENCOUNTER — OFFICE VISIT (OUTPATIENT)
Dept: INTERNAL MEDICINE | Facility: CLINIC | Age: 71
End: 2021-09-14

## 2021-09-14 VITALS
HEIGHT: 65 IN | TEMPERATURE: 96.6 F | HEART RATE: 77 BPM | WEIGHT: 172.6 LBS | OXYGEN SATURATION: 98 % | SYSTOLIC BLOOD PRESSURE: 132 MMHG | DIASTOLIC BLOOD PRESSURE: 73 MMHG | BODY MASS INDEX: 28.76 KG/M2

## 2021-09-14 DIAGNOSIS — E04.9 NODULAR GOITER, NON-TOXIC: ICD-10-CM

## 2021-09-14 DIAGNOSIS — R53.83 OTHER FATIGUE: ICD-10-CM

## 2021-09-14 DIAGNOSIS — Z13.220 ENCOUNTER FOR LIPID SCREENING FOR CARDIOVASCULAR DISEASE: ICD-10-CM

## 2021-09-14 DIAGNOSIS — R05.9 COUGH: ICD-10-CM

## 2021-09-14 DIAGNOSIS — M62.81 POST-POLIO LIMB MUSCLE WEAKNESS: Chronic | ICD-10-CM

## 2021-09-14 DIAGNOSIS — Z13.6 ENCOUNTER FOR LIPID SCREENING FOR CARDIOVASCULAR DISEASE: ICD-10-CM

## 2021-09-14 DIAGNOSIS — B91 POST-POLIO LIMB MUSCLE WEAKNESS: Chronic | ICD-10-CM

## 2021-09-14 DIAGNOSIS — A80.9 POLIO: Primary | Chronic | ICD-10-CM

## 2021-09-14 DIAGNOSIS — E55.9 AVITAMINOSIS D: ICD-10-CM

## 2021-09-14 PROCEDURE — 99214 OFFICE O/P EST MOD 30 MIN: CPT | Performed by: NURSE PRACTITIONER

## 2021-09-14 NOTE — PROGRESS NOTES
Chief Complaint  Establish Care (new patient ), post polio, and COVID (positive 9/1/21)     Subjective:      History of Present Illness {CC  Problem List  Visit  Diagnosis   Encounters  Notes  Medications  Labs  Result Review Imaging  Media :23}     Irlanda Henry presents to [facility]     She is a prior patient of Calvin Akins MD.   Dr Akins has now retired and she is here to establish care with me.      She has multiple chronic medical conditions including: post polio syndrome, GERD, OA, gout, thyroiditis, vitamin d deficiency     She went to establish with Dr Coker on 3/16/21   She states they were not meant for each other.     She states tested positive for COVID 9/1/21  Vaccinated (last 4/17/21)   Takes care of grandchild and states that parents drop them off but they are vaccinated.  Explained that even vaccinated can transmit to others.   Symptoms fatigue, dry cough.  Lasted 3 days starting on 8/29. She would like to be retested. Family concerned about her caring for infant.     Polio: as infant   Right hip dysplasia 2/2 polio syndrome with muscular atrophy. Right long leg brace  Ortho: Dr Mcgill   She does a support group for Polio.     6/23/21: Mammogram: no evidence of malignancy     GYN: Dr Clayton  (states she never went)   Declines further colonoscopy.  No family hx.  No change in bowels.      Objective:      Physical Exam  Vitals reviewed.   Constitutional:       Appearance: Normal appearance. She is well-developed.   HENT:      Head:      Comments: Wearing mask due to COVID   Neck:      Thyroid: No thyromegaly.   Cardiovascular:      Rate and Rhythm: Normal rate and regular rhythm.      Pulses: Normal pulses.      Heart sounds: Normal heart sounds.   Pulmonary:      Effort: Pulmonary effort is normal.      Breath sounds: Normal breath sounds.      Comments: E/U   Musculoskeletal:      Cervical back: Normal range of motion and neck supple.       "Comments: Atrophy right lower extremity   Brace right leg    Lymphadenopathy:      Cervical: No cervical adenopathy.   Skin:     General: Skin is warm and dry.      Capillary Refill: Capillary refill takes 2 to 3 seconds.   Neurological:      Mental Status: She is alert and oriented to person, place, and time.   Psychiatric:         Mood and Affect: Mood normal.         Behavior: Behavior normal. Behavior is cooperative.         Thought Content: Thought content normal.         Judgment: Judgment normal.        Result Review  Data Reviewed:{ Labs  Result Review  Imaging  Med Tab  Media :23}   The following data was reviewed by: Carlitos Shah III, NP-C on 09/14/2021  Lab Results - Last 18 Months   Lab Units 03/16/21  1405   GLUCOSE mg/dL CANCELED   BUN mg/dL 18   CREATININE mg/dL 0.59   EGFR IF NONAFRICN AM mL/min/1.73 94   EGFR IF AFRICN AM mL/min/1.73 108   SODIUM mmol/L 143   POTASSIUM mmol/L CANCELED   CHLORIDE mmol/L 107*   CALCIUM mg/dL 8.9   ALBUMIN g/dL 4.0   BILIRUBIN mg/dL 0.3   ALK PHOS IU/L 46   AST (SGOT) IU/L 21   ALT (SGPT) IU/L 23   WBC x10E3/uL 4.3   RBC x10E6/uL 4.51   HEMATOCRIT % 41.1   MCV fL 91   MCH pg 30.2          Vital Signs:   /73 (BP Location: Left arm, Patient Position: Sitting, Cuff Size: Adult)   Pulse 77   Temp 96.6 °F (35.9 °C)   Ht 165.1 cm (65\")   Wt 78.3 kg (172 lb 9.6 oz)   SpO2 98%   BMI 28.72 kg/m²         Requested Prescriptions      No prescriptions requested or ordered in this encounter     Routine medications provided by this office will also be refilled via pharmacy request.     No current outpatient medications on file.     Assessment and Plan:      Assessment and Plan {CC Problem List  Visit Diagnosis  ROS  Review (Popup)  [facility] Maintenance  Quality  BestPractice  Medications  SmartSets  SnapShot Encounters  Media :23}     Problem List Items Addressed This Visit        Endocrine and Metabolic    Avitaminosis D (Chronic)    " Relevant Orders    CBC (No Diff)    Vitamin D 25 hydroxy    Nodular goiter, non-toxic (Chronic)    Relevant Orders    TSH    T4, free       Infectious Diseases    Polio - Primary (Chronic)    Overview     She is a member of polio support group   [email or web address]             Musculoskeletal and Injuries    Post-polio limb muscle weakness (Chronic)    Overview     Right lower extremity            Other Visit Diagnoses     Encounter for lipid screening for cardiovascular disease        Relevant Orders    Comprehensive Metabolic Panel    Lipid Panel With LDL / HDL Ratio    CBC (No Diff)    Other fatigue        Relevant Orders    COVID-19,[facility] ROUTINE, NP/OP SWAB IN TRANSPORT MEDIA OR ESWAB 72 HR TAT - Swab, Oropharynx    Cough        Relevant Orders    COVID-19,[facility] ROUTINE, NP/OP SWAB IN TRANSPORT MEDIA OR ESWAB 72 HR TAT - Swab, Oropharynx        I spent 46 minutes caring for Irlanda on this date of service. This time includes time spent by me in the following activities: preparing for the visit, reviewing tests, obtaining and/or reviewing a separately obtained history, performing a medically appropriate examination and/or evaluation, counseling and educating the patient/family/caregiver, ordering medications, tests, or procedures and documenting information in the medical record    Lab spoke with practice lead and they will have her to come back for routine labs as she recently tested positive.     Follow Up {Instructions Charge Capture  Follow-up Communications :23}     Return in about 1 year (around 9/14/2022) for Medicare Wellness.    Patient was given instructions and counseling regarding her condition or for health maintenance advice. Please see specific information pulled into the AVS if appropriate.    Kristenon disclaimer:   Much of this encounter note is an electronic transcription/translation of spoken language to printed text. The electronic translation of spoken language may permit erroneous, or  at times, nonsensical words or phrases to be inadvertently transcribed; Although I have reviewed the note for such errors, some may still exist.     Additional Patient Counseling:       There are no Patient Instructions on file for this visit.

## 2021-09-15 LAB
LABCORP SARS-COV-2, NAA 2 DAY TAT: NORMAL
SARS-COV-2 RNA RESP QL NAA+PROBE: NOT DETECTED

## 2021-09-16 LAB — REF LAB TEST METHOD: NORMAL

## 2021-10-04 ENCOUNTER — LAB (OUTPATIENT)
Dept: LAB | Facility: HOSPITAL | Age: 71
End: 2021-10-04

## 2021-10-04 ENCOUNTER — TELEPHONE (OUTPATIENT)
Dept: INTERNAL MEDICINE | Facility: CLINIC | Age: 71
End: 2021-10-04

## 2021-10-04 DIAGNOSIS — E04.9 NODULAR GOITER, NON-TOXIC: ICD-10-CM

## 2021-10-04 DIAGNOSIS — R53.82 CHRONIC FATIGUE: ICD-10-CM

## 2021-10-04 DIAGNOSIS — E55.9 AVITAMINOSIS D: ICD-10-CM

## 2021-10-04 DIAGNOSIS — E04.9 NODULAR GOITER, NON-TOXIC: Primary | ICD-10-CM

## 2021-10-04 LAB
25(OH)D3 SERPL-MCNC: 25 NG/ML (ref 30–100)
TSH SERPL DL<=0.05 MIU/L-ACNC: 2.27 UIU/ML (ref 0.27–4.2)
VIT B12 BLD-MCNC: 395 PG/ML (ref 211–946)

## 2021-10-04 PROCEDURE — 82607 VITAMIN B-12: CPT

## 2021-10-04 PROCEDURE — 36415 COLL VENOUS BLD VENIPUNCTURE: CPT

## 2021-10-04 PROCEDURE — 82306 VITAMIN D 25 HYDROXY: CPT

## 2021-10-04 PROCEDURE — 84443 ASSAY THYROID STIM HORMONE: CPT

## 2021-10-04 NOTE — TELEPHONE ENCOUNTER
Caller: Irlanda Henry    Relationship: Self    Best call back number: [phone] (M)    What is the best time to reach you: ANYTIME- HAS AN APPOINTMENT TODAY AROUND 4:00 PM    Who are you requesting to speak with (clinical staff, provider,  specific staff member): WHIT ROCA     Do you know the name of the person who called:     What was the call regarding: PATIENT HAS SOME QUESTIONS TO ASK-    Do you require a callback: YES        THANKS

## 2021-10-25 ENCOUNTER — TELEPHONE (OUTPATIENT)
Dept: INTERNAL MEDICINE | Facility: CLINIC | Age: 71
End: 2021-10-25

## 2021-10-25 NOTE — TELEPHONE ENCOUNTER
Caller: Irlanda Henry    Relationship: Self    Best call back number: [phone]    What was the call regarding: PATIENT HAS QUESTIONS REGARDING HER BOOSTER VACCINE AND THEN SOME OTHER QUESTIONS REGARDING HER LAST APPOINTMENT WITH SARAH. SHE HAS QUESTIONS ABOUT WHY SHE WAS NOT ABLE TO DO HER LABS WHEN SHE WAS THERE AND WHY SHE WAS NOT ABLE TO DO THEM.     PATIENT ALSO GAVE SARAH HER POLIO CARDS AND SHE WAS NEVER GIVEN THEM BACK. SHE NEEDS THOSE CARDS ON HER AT ALL TIMES, PLEASE ADVISE IF THE OFFICE HAS THEM.    Do you require a callback: YES-PLEASE ONLY CALL THE CELL PHONE NOT THE HOUSE PHONE. PATIENT CAN NOT GET TO THE HOUSE NUMBER.

## 2021-10-25 NOTE — TELEPHONE ENCOUNTER
Caller: Irlanda Henry    Relationship: Self    Best call back number: [phone]     What is the best time to reach you: ANYTIME    Who are you requesting to speak with (clinical staff, provider,  specific staff member): PROVIDER     Do you know the name of the person who called:    What was the call regarding: PATIENT HAS QUESTION THAT SHE WOULD LIKE TO DISCUSS WITH WHIT ROCA SHE DID NOT GIVE ANY FURTHER DETAIL     Do you require a callback: YES

## 2021-10-25 NOTE — TELEPHONE ENCOUNTER
Patient was last in 9/14/21 as New Patient and was tested for COVID during that visit so we did not get labs this day.    Please advise on Polio cards

## 2021-10-25 NOTE — TELEPHONE ENCOUNTER
Left message for patient as Avinash is out of the office today, needing to get more information from the hub.

## 2021-10-28 NOTE — TELEPHONE ENCOUNTER
I spoke with the pt and her Polio cards and found her Polio cards  Can you please place her lab orders and I will call her back and schedule her for a lab appt     No need I see there are orders in the system

## 2021-10-29 ENCOUNTER — TELEPHONE (OUTPATIENT)
Dept: ORTHOPEDIC SURGERY | Facility: CLINIC | Age: 71
End: 2021-10-29

## 2021-10-29 DIAGNOSIS — M62.81 POST-POLIO LIMB MUSCLE WEAKNESS: Primary | ICD-10-CM

## 2021-10-29 DIAGNOSIS — B91 POST-POLIO LIMB MUSCLE WEAKNESS: Primary | ICD-10-CM

## 2021-11-01 NOTE — TELEPHONE ENCOUNTER
Patient wants to know when the script will be faxed to the [facility] so that she can get her brace. Please call the patient when script has been faxed.

## 2021-11-08 ENCOUNTER — TELEPHONE (OUTPATIENT)
Dept: INTERNAL MEDICINE | Facility: CLINIC | Age: 71
End: 2021-11-08

## 2021-11-08 NOTE — TELEPHONE ENCOUNTER
Caller: Irlanda Henry    Relationship to patient: Self    Best call back number: [phone]    Chief complaint: LAB WORK    Type of visit: LAB    Requested date: A FUTURE Monday OR Wednesday     If rescheduling, when is the original appointment: 11/10/2021    Additional notes: PATIENT UNABLE TO MAKE APPOINTMENT THIS WEEK.  PLEASE CALL TO RESCHEDULE     ATTEMPTED TO WARM TRANSFER

## 2021-12-14 ENCOUNTER — OFFICE VISIT (OUTPATIENT)
Dept: ORTHOPEDIC SURGERY | Facility: CLINIC | Age: 71
End: 2021-12-14

## 2021-12-14 VITALS — HEIGHT: 65 IN | WEIGHT: 172 LBS | TEMPERATURE: 97.8 F | BODY MASS INDEX: 28.66 KG/M2

## 2021-12-14 DIAGNOSIS — R52 PAIN: Primary | ICD-10-CM

## 2021-12-14 PROCEDURE — 99214 OFFICE O/P EST MOD 30 MIN: CPT | Performed by: ORTHOPAEDIC SURGERY

## 2021-12-14 PROCEDURE — 73562 X-RAY EXAM OF KNEE 3: CPT | Performed by: ORTHOPAEDIC SURGERY

## 2021-12-14 NOTE — PROGRESS NOTES
"Patient: Irlanda Henry  YOB: 1950 71 y.o. female  Medical Record Number: [id number]    Chief Complaints:   Chief Complaint   Patient presents with   • Left Knee - Follow-up   • Knee Pain     Patient states that she is having pain today, and that she has questions about her visit with Dr. Cummins       History of Present Illness:Irlanda Henry is a 71 y.o. female who presents for follow-up of multiple issues she is having fairly severe pain in the left lateral knee.  She has polio of the right lower extremity which is essentially nonfunctional.  I seen her in the past recommended against any surgical intervention which I still feel fairly strongly.  She has trouble getting around she is using a walker now she uses a drop lock knee brace on the right side.    Allergies: No Known Allergies    Medications:   No current outpatient medications on file.     No current facility-administered medications for this visit.         The following portions of the patient's history were reviewed and updated as appropriate: allergies, current medications, past family history, past medical history, past social history, past surgical history and problem list.    Review of Systems:   A 14 point review of systems was performed. All systems negative except pertinent positives/negative listed in HPI above    Physical Exam:   Vitals:    12/14/21 1547   Temp: 97.8 °F (36.6 °C)   Weight: 78 kg (172 lb)   Height: 165.1 cm (65\")       General: A and O x 3, ASA, NAD    SCLERA:    Normal    DENTITION:   Normal  Knee:  left    ALIGNMENT:     Varus  ,   Patella  tracks  midline    GAIT:    Antalgic severe with walker    SKIN:   Healed medial and lateral knee incisions    RANGE OF MOTION:   0  -  110   DEG    STRENGTH:   4  / 5    LIGAMENTS:    No varus / valgus instability.   Negative  Lachman.    MENISCUS:     Negative   Chucky       DISTAL PULSES:    Paplable    DISTAL SENSATION :   Intact    LYMPHATICS:     No   " lymphadenopathy    OTHER:          - Positive   Effusion -  Very TTP lateral tibia      - Crepitance with ROM   There is essentially no motor function or the right LE from the hip distal    Radiology:  Xrays 3views left knee (ap,lateral, sunrise) were ordered and reviewed for evaluation of knee pain  demonstratingadvanced varus osteoarthritis with bone on bone articulation, subchondral cysts, and periarticular osteophytes-  There are multiple physeal staples on the femoral and tibial side.  todays xrays were compared to previous xrays and demonstrate no change    Assessment/Plan:  Left knee severe lateral knee pain. Not a great surgical candidate given her severe right LE problems and multiple incisions. I'm going to get a CT to see if the lateral tibial staples are prominent and could be removed. I also think she would be a ggod candidate for a power scooter / wheelchair given her severe multiple extremity involvement and right shoulder problems and inability rto use cruthces / walker.

## 2021-12-27 ENCOUNTER — HOSPITAL ENCOUNTER (OUTPATIENT)
Dept: CT IMAGING | Facility: HOSPITAL | Age: 71
Discharge: HOME | End: 2021-12-27
Admitting: ORTHOPAEDIC SURGERY

## 2021-12-27 DIAGNOSIS — R52 PAIN: ICD-10-CM

## 2021-12-27 PROCEDURE — 73700 CT LOWER EXTREMITY W/O DYE: CPT

## 2021-12-28 ENCOUNTER — IMMUNIZATION (OUTPATIENT)
Dept: VACCINE CLINIC | Facility: HOSPITAL | Age: 71
End: 2021-12-28

## 2021-12-28 PROCEDURE — 91300 HC SARSCOV02 VAC 30MCG/0.3ML IM: CPT | Performed by: INTERNAL MEDICINE

## 2021-12-28 PROCEDURE — 0004A HC ADM SARSCOV2 30MCG/0.3ML BOOSTER: CPT | Performed by: INTERNAL MEDICINE

## 2021-12-29 LAB
25(OH)D3+25(OH)D2 SERPL-MCNC: 25.6 NG/ML (ref 30–100)
ALBUMIN SERPL-MCNC: 4 G/DL (ref 3.8–4.8)
ALBUMIN/GLOB SERPL: 1.9 {RATIO} (ref 1.2–2.2)
ALP SERPL-CCNC: 48 IU/L (ref 44–121)
ALT SERPL-CCNC: 20 IU/L (ref 0–32)
AST SERPL-CCNC: 20 IU/L (ref 0–40)
BILIRUB SERPL-MCNC: 0.3 MG/DL (ref 0–1.2)
BUN SERPL-MCNC: 17 MG/DL (ref 8–27)
BUN/CREAT SERPL: 21 (ref 12–28)
CALCIUM SERPL-MCNC: 9.2 MG/DL (ref 8.7–10.3)
CHLORIDE SERPL-SCNC: 109 MMOL/L (ref 96–106)
CHOLEST SERPL-MCNC: 190 MG/DL (ref 100–199)
CO2 SERPL-SCNC: 21 MMOL/L (ref 20–29)
CREAT SERPL-MCNC: 0.81 MG/DL (ref 0.57–1)
ERYTHROCYTE [DISTWIDTH] IN BLOOD BY AUTOMATED COUNT: 13 % (ref 11.7–15.4)
GLOBULIN SER CALC-MCNC: 2.1 G/DL (ref 1.5–4.5)
GLUCOSE SERPL-MCNC: 95 MG/DL (ref 65–99)
HCT VFR BLD AUTO: 41.8 % (ref 34–46.6)
HDLC SERPL-MCNC: 77 MG/DL
HGB BLD-MCNC: 14.3 G/DL (ref 11.1–15.9)
LDLC SERPL CALC-MCNC: 100 MG/DL (ref 0–99)
LDLC/HDLC SERPL: 1.3 RATIO (ref 0–3.2)
MCH RBC QN AUTO: 30.6 PG (ref 26.6–33)
MCHC RBC AUTO-ENTMCNC: 34.2 G/DL (ref 31.5–35.7)
MCV RBC AUTO: 89 FL (ref 79–97)
PLATELET # BLD AUTO: 211 X10E3/UL (ref 150–450)
POTASSIUM SERPL-SCNC: 4.5 MMOL/L (ref 3.5–5.2)
PROT SERPL-MCNC: 6.1 G/DL (ref 6–8.5)
RBC # BLD AUTO: 4.68 X10E6/UL (ref 3.77–5.28)
SODIUM SERPL-SCNC: 144 MMOL/L (ref 134–144)
T4 FREE SERPL-MCNC: 1.16 NG/DL (ref 0.82–1.77)
TRIGL SERPL-MCNC: 68 MG/DL (ref 0–149)
TSH SERPL DL<=0.005 MIU/L-ACNC: 2.87 UIU/ML (ref 0.45–4.5)
VLDLC SERPL CALC-MCNC: 13 MG/DL (ref 5–40)
WBC # BLD AUTO: 4.5 X10E3/UL (ref 3.4–10.8)

## 2022-01-05 ENCOUNTER — TELEPHONE (OUTPATIENT)
Dept: ORTHOPEDIC SURGERY | Facility: CLINIC | Age: 72
End: 2022-01-05

## 2022-01-05 NOTE — TELEPHONE ENCOUNTER
I called and attempted to contact the patient to discuss their CT results as well as Dr. Mcgill's treatment recommendations.  Patient did not answer therefore I left them a voicemail instructing them to give us a call back at our office so we could discuss their results and treatment recommendations.

## 2022-01-10 ENCOUNTER — TELEPHONE (OUTPATIENT)
Dept: ORTHOPEDIC SURGERY | Facility: CLINIC | Age: 72
End: 2022-01-10

## 2022-01-10 DIAGNOSIS — M16.11 PRIMARY OSTEOARTHRITIS OF RIGHT HIP: Primary | ICD-10-CM

## 2022-01-10 DIAGNOSIS — M62.81 POST-POLIO LIMB MUSCLE WEAKNESS: ICD-10-CM

## 2022-01-10 DIAGNOSIS — B91 POST-POLIO LIMB MUSCLE WEAKNESS: ICD-10-CM

## 2022-01-10 DIAGNOSIS — M25.562 LEFT KNEE PAIN, UNSPECIFIED CHRONICITY: ICD-10-CM

## 2022-01-10 NOTE — TELEPHONE ENCOUNTER
Caller: NAHEED LAMBERT    Relationship to patient: SELF    Best call back number: [phone]    Patient is needing: PATIENT IS REQUESTING PRESCRIPTION BE SENT OVER TO [facility]-- PT IS UPSET SHE WAS TOLD TO CALL [facility] AND REQUEST SMALLEST CHAIR OR SCOOTER FOR HER HOME. [facility] TOLD PATIENT THAT DR SON HAS TO SEND THE PRESCRIPTION FIRST- PATIENT IS REQUESTING THAT PRESCRIPTION BE SENT FOR BOTH SO SHE CAN SEE WHAT WILL WORK BEST FOR HER SMALL HOME-- IS ALSO REQUESTING A CALL BACK BECAUSE SHE NEEDS CLARIFICATION ON WHAT IS GOING BACK AND FORTH BETWEEN THE OFFICE AND [facility]-    PLEASE CALL AND ADVISE PATIENT- THANK YOU.

## 2022-04-14 ENCOUNTER — TRANSCRIBE ORDERS (OUTPATIENT)
Dept: ORTHOPEDIC SURGERY | Facility: CLINIC | Age: 72
End: 2022-04-14

## 2022-04-14 DIAGNOSIS — M25.562 LEFT KNEE PAIN, UNSPECIFIED CHRONICITY: ICD-10-CM

## 2022-04-14 DIAGNOSIS — G14 POST-POLIO SYNDROME: Primary | ICD-10-CM

## 2022-04-14 DIAGNOSIS — M15.9 OSTEOARTHRITIS OF MULTIPLE JOINTS, UNSPECIFIED OSTEOARTHRITIS TYPE: ICD-10-CM

## 2022-04-18 ENCOUNTER — HOSPITAL ENCOUNTER (OUTPATIENT)
Dept: PHYSICAL THERAPY | Facility: HOSPITAL | Age: 72
Setting detail: THERAPIES SERIES
Discharge: HOME | End: 2022-04-18

## 2022-04-18 DIAGNOSIS — Z46.89 FITTING AND ADJUSTMENT OF WHEELCHAIR: Primary | ICD-10-CM

## 2022-04-18 DIAGNOSIS — M25.562 LEFT KNEE PAIN, UNSPECIFIED CHRONICITY: ICD-10-CM

## 2022-04-18 DIAGNOSIS — G14 POST-POLIO SYNDROME: ICD-10-CM

## 2022-04-18 DIAGNOSIS — Z74.09 IMPAIRED FUNCTIONAL MOBILITY, BALANCE, GAIT, AND ENDURANCE: ICD-10-CM

## 2022-04-18 DIAGNOSIS — M15.9 OSTEOARTHRITIS OF MULTIPLE JOINTS, UNSPECIFIED OSTEOARTHRITIS TYPE: ICD-10-CM

## 2022-04-18 PROCEDURE — 97542 WHEELCHAIR MNGMENT TRAINING: CPT

## 2022-04-18 PROCEDURE — 97162 PT EVAL MOD COMPLEX 30 MIN: CPT

## 2022-04-18 NOTE — THERAPY DISCHARGE NOTE
Outpatient Physical Therapy Neuro Initial Evaluation/Discharge  [facility]     Patient Name: Irlanda Henry  : 1950  MRN: [id number]  Today's Date: 2022      Visit Date: 2022    Patient Active Problem List   Diagnosis   • Chest pain   • Costochondritis, acute   • Post-polio limb muscle weakness   • Gastroesophageal reflux disease with esophagitis   • Allergic arthritis of right hip   • Gout of hand   • Chronic fatigue   • Avitaminosis D   • Nodular goiter, non-toxic   • Thyroiditis   • Polio        Past Medical History:   Diagnosis Date   • Polio    • Vasovagal syncope         Past Surgical History:   Procedure Laterality Date   • CARDIAC CATHETERIZATION N/A 2017    Procedure: Left Heart Cath;  Surgeon: Jose Murphy MD;  Location:  [facility];  Service:    • CARDIAC CATHETERIZATION N/A 2017    Procedure: Coronary angiography;  Surgeon: Jose Murphy MD;  Location:  [facility];  Service:    • CARPAL TUNNEL RELEASE     • CATARACT EXTRACTION     • CHOLECYSTECTOMY     • LEG SURGERY      multiple surgeries for polio   • RETINAL DETACHMENT REPAIR Left          Visit Dx:     ICD-10-CM ICD-9-CM   1. Fitting and adjustment of wheelchair  Z46.89 V53.8   2. Post-polio syndrome  G14 138   3. Osteoarthritis of multiple joints, unspecified osteoarthritis type  M15.9 715.89   4. Left knee pain, unspecified chronicity  M25.562 719.46   5. Impaired functional mobility, balance, gait, and endurance  Z74.09 V49.89        Patient History     Row Name 22 1101             History    Chief Complaint Balance Problems;Difficulty Walking;Difficulty with daily activities;Falls/history of falls;Muscle weakness;Pain  -MANNY      Type of Pain Knee pain  left, chronic  -MANNY      Date Current Problem(s) Began 22  date of referral  -MANNY      Brief Description of Current Complaint Pt is referred to PT for wheelchair mobility assessment by Dr. David Mcgill with  "diagnosis of post polio syndrome, painful L knee and polyosteoarthritis. She was diagnosed with polio at 8 months old with deficits of paralysis of R LE. Pt has used crutches and long leg brace with drop lock at knee for R LE since she began walking as a child. She cannot ambulate with crutches now due  painful shoulders (after many years relying on UE's using crutches) and carpal tunnel B (R carpal tunnel surgery in past), decreased balance and falls. She is using a rollator but struggles to use that and has had falls. Pt has painful L knee due to overuse past \"70 years\" per pt. Dr. Mcgill feels due to previous surgery to her L knee as a child, pt is not a surgical candidate for L knee. Pt reports she struggles to complete her ADL's.  -MANNY      Previous treatment for THIS PROBLEM Rehabilitation;Pain Management  -MANNY      Patient/Caregiver Goals Other (comment)  evaluation of power mobility  -MANNY      Hand Dominance right-handed  -MANNY              Pain     Pain Location Knee;Shoulder  -MANNY      Pain at Present 7  L knee at rest  -MANNY              Fall Risk Assessment    Any falls in the past year: Yes  -MANNY      Number of falls reported in the last 12 months 2  -MANNY      Factors that contributed to the fall: Lost balance  -MANNY      Does patient have a fear of falling Yes (comment)  -MANNY              Daily Activities    Primary Language English  -MANNY      Are you able to read Yes  -MANNY      Are you able to write Yes  -MANNY      How does patient learn best? Listening  -MANNY      Teaching needs identified Home Safety;Falls Prevention  -MANNY      Patient is concerned about/has problems with Climbing Stairs;Difficulty with self care (i.e. bathing, dressing, toileting:;Performing home management (household chores, shopping, care of dependents);Standing;Transfers (getting out of a chair, bed);Walking  -MANNY      Does patient have problems with the following? None  -MANNY      Pt Participated in POC and Goals Yes  -MANNY              Safety    Are " "you being hurt, hit, or frightened by anyone at home or in your life? No  -MANNY      Are you being neglected by a caregiver No  -MANNY            User Key  (r) = Recorded By, (t) = Taken By, (c) = Cosigned By    Initials Name Provider Type    Sara Moura, PT Physical Therapist                     PT Neuro     Row Name 04/18/22 1101             Subjective Comments    Subjective Comments Pt reports due to painful L knee and imbalance with falls, she does not ambulate much at home. \"I sit on the couch and get up to use the bathroom.\" She admits she struggles to complete her ADL's.  -MANNY              Precautions and Contraindications    Precautions/Limitations fall precautions  -MANNY              Subjective Pain    Able to rate subjective pain? yes  -MANNY      Pre-Treatment Pain Level 7  -MANNY      Post-Treatment Pain Level 7  -MANNY      Subjective Pain Comment L knee; B shoulders increased from 0 to \"1\" after she propelled optimally configured manual wheelchair.  -MANNY              Home Living    Current Living Arrangements home  lives alone in a 2 story home, bedroom on 1st floor, laundry in basement.  -MANNY      Home Accessibility wheelchair accessible;stairs to enter home;stairs within home  pt states ramp is in backyard  -MANNY      Number of Stairs, Main Entrance five  -MANNY      Stair Railings, Main Entrance --  1 rail and wall on other side  -MANNY      Home Equipment Rollator;Crutches  travel wheelchair; pt has a 10 y/o scooter \"that does not work\".  -MANNY              Vision-Basic Assessment    Current Vision Wears glasses all the time  -MANNY              Cognition    Overall Cognitive Status WFL  -MANNY      Memory Appears intact  -MANNY      Orientation Level Oriented X4  -MANNY      Safety Judgment Good awareness of safety precautions  -MANNY      Deficits Fully aware of deficits  -MANNY              Sensation    Light Touch No apparent deficits  -MANNY      Additional Comments pt mentioned intermittent numbness of B hands  -MANNY              " Posture/Observations    Alignment Options Forward head;Rounded shoulders;Lumbar lordosis  -MANNY      Forward Head Mild  -MANNY      Rounded Shoulders Moderate  -MANNY      Lumbar lordosis Moderate;Sitting posture;Decreased  -MANNY      Posture/Observations Comments Pt is a well nourished female who arrived to rehab waiting room in a travel wheelchair pushed by daughter.  -MANNY              General ROM    GENERAL ROM COMMENTS AROM B UE's and L LE WFLs; PROM WFLs R LE  -MANNY              MMT (Manual Muscle Testing)    Rt Upper Ext Comments  -MANNY      Lt Upper Ext Lt Scapular ADduction & Medial Rotation;Comments  -MANNY      Rt Lower Ext Comments  -MANNY      Lt Lower Ext Comments  -MANNY              MMT Right Upper Ext    Rt Upper Extremity Comments  shoulder flexion 4/5, elbow flexion 4/5, elbow extension 4-/5, wrist extension 4-/5  -MANNY              MMT Left Upper Ext    Lt Upper Extremity Comments  overall 4/5  -MANNY              MMT Right Lower Ext    Rt Lower Extremity Comments  0/5 strength entire LE  -MANNY              MMT Left Lower Ext    Lt Lower Extremity Comments  hip flexion 3+/5, knee extension 3+/5, ankle DF 4/5  -MANNY              Transfers    Bed-Chair [place] (Transfers) supervision;modified independence  -MANNY      Chair-Bed [place] (Transfers) supervision;modified independence  -MANNY      Transfers, Bed-Chair-Bed, Assist Device four wheeled walker  or no device but pt reaching for w/c to turn around  -MANNY      Sit-Stand [place] (Transfers) modified independence  -MANNY      Stand-Sit [place] (Transfers) modified independence  -      Transfers, Sit-Stand-Sit, Assist Device four wheeled walker  -MANNY      Transfer, Safety Issues step length decreased;weight-shifting ability decreased;balance decreased during turns  -MANNY      Transfer, Impairments strength decreased;impaired balance;pain  -MANNY      Comment, (Transfers) Pt relies on UE's to assist with transfers. Pt has to push locks down at knee on R leg brace once comes  to stand and then release prior to sit down.  -MANNY              Gait/Stairs (Locomotion)    [place] Level (Gait) standby assist  -MANNY      Assistive Device (Gait) walker, 4-wheeled  -MANNY      Distance in Feet (Gait) 20', 10'  -MANNY      Pattern (Gait) step-to  with L LE  -MANNY      Deviations/Abnormal Patterns (Gait) bilateral deviations;gait speed decreased;base of support, wide;right sided deviations;weight shifting decreased;left sided deviations;stride length decreased  -MANNY      Left Sided Gait Deviations leans left  during R LE swing phase  -MANNY      Right Sided Gait Deviations hip hiking;hip circumduction;foot drop/toe drag;weight shift ability decreased  pt has no knee flexion/extension due to drop lock at knee with long leg brace  -MANNY      Comment, (Gait/Stairs) see Venkatetti and TUG  -MANNY              Wheelchair Mobility/Management    Mobility Activities (Wheelchair) forward propulsion;steering;turning  -MANNY      Forward Propulsion [place] (Wheelchair) standby assist  -MANNY      Steering [place] (Wheelchair) standby assist  -MANNY      Turning [place] (Wheelchair) standby assist  -AMNNY      Distance Propelled in Feet (Wheelchair) Pt propelled an optimally configured manual wheelchair 16' on low pile carpet in 22 seconds. Pt's B shoulder pain increased from zero (0) to 1 after propelling the optimally configured manual wheelchair. Pt sat in a power w/c in PT gym and was able to maneuver chair with toggle control independently.  -MANNY            User Key  (r) = Recorded By, (t) = Taken By, (c) = Cosigned By    Initials Name Provider Type    Sara Moura PT Physical Therapist                        Therapy Education  Education Details: Discussed safety at home to prevent falls and recommendation of safer wheeled mobility device.  Given: Symptoms/condition management, Fall prevention and home safety  How Provided: Verbal  Provided to: Patient, Caregiver (daughter came to end of session)  Level of  "Understanding: Teach back education performed, Verbalized     PT OP Goals     Row Name 04/18/22 1300          Long Term Goals    LTG Date to Achieve 04/18/22  -MANNY     LTG 1 Pt to be evaluated for appropriate wheeled mobility device to allow pt to be independent in her home environment.  -MANNY     LTG 1 Progress Met  -MANNY            Time Calculation    PT Goal Re-Cert Due Date 04/18/22  -MANNY           User Key  (r) = Recorded By, (t) = Taken By, (c) = Cosigned By    Initials Name Provider Type    Sara Moura, PT Physical Therapist                 PT Assessment/Plan     Row Name 04/18/22 1329 04/18/22 1326       PT Assessment    Functional Limitations -- Decreased safety during functional activities;Impaired gait;Limitation in home management;Limitations in functional capacity and performance;Performance in self-care ADL  -    Impairments -- Balance;Endurance;Gait;Muscle strength;Pain;Motor function;Joint integrity  -    Assessment Comments Pt is referred to PT for wheelchair mobility assessment by Dr. David Mcgill with diagnosis of post polio syndrome, painful L knee and polyosteoarthritis. Pt has complete paralysis of R LE as result her polio requiring her to wear long leg brace with drop lock and using assistive device since a child. She cannot ambulate with crutches now due  painful shoulders (after many years relying on UE's using crutches) and carpal tunnel B (R carpal tunnel surgery in past), decreased balance and falls. She is using a rollator but struggles to use that and has had falls. Pt has painful L knee due to overuse past \"70 years\" and \"double duty\" per pt. Dr. Mcgill feels due to previous surgery to her L knee as a child, pt is not a surgical candidate for L knee. Pt reports she struggles to complete her ADL's. Pt's transfers are labored and gait pattern is antalgic. Pt was only able to to propel an optimally configured manual wheelchair 16' on low pile carpet in 22 seconds. She c/o increased " pain B shoulders after propelling the w/c. Due to pt's B shoulder pain, R UE weakness and history of carpal tunnel B wrist (previous surgery on R), and paralysis of R LE wearing long leg brace, pt is not safe to use a scooter and limit the ability to operate the scooter tiller. Pt has significant deficits with transfers, balance and gait as indicated by the low scores on the Tinetti (11/28) and time on TUG was slow (29 seconds). The outcome measures indicate a high risk for falls. An upright device (cane, crutches, or walker), optimally configured manual wheelchair and a scooter are not safe or timely for this pt to use for ADL's. Therefore, a Edge 3 power wheelchair with captains seat due to her diagnosis of post polio syndrome. The Edge 3 is both programmable and upgradeable that can be adjusted due to her diagnosis. The pt has the mental capabilities to operate a power wheelchair safely. The Edge 3 power wheelchair will allow the pt to remain independent in her home environment by helping her complete her ADL's. GERALDINE Oseguera, a [facility]  was present to aid in the determination of appropriate mobility device.  -MANNY --    Please refer to paper survey for additional self-reported information -- Yes  -MANNY    Rehab Potential -- Good  -MANNY    Patient/caregiver participated in establishment of treatment plan and goals -- Yes  -MANNY    Patient would benefit from skilled therapy intervention -- Yes  -MANNY       PT Plan    PT Frequency -- One time visit  -MANNY    Planned CPT's? -- PT EVAL MOD COMPLELITY: 95411;PT WHEELCHAIR MGMT EA 15 MIN: 52222  -MANNY    Physical Therapy Interventions (Optional Details) -- wheelchair management/propulsion training  -MANNY          User Key  (r) = Recorded By, (t) = Taken By, (c) = Cosigned By    Initials Name Provider Type    Sara Moura, PT Physical Therapist                  OP Exercises     Row Name 04/18/22 5636             Subjective Comments    Subjective Comments  "Pt reports due to painful L knee and imbalance with falls, she does not ambulate much at home. \"I sit on the couch and get up to use the bathroom.\" She admits she struggles to complete her ADL's.  -MANNY              Subjective Pain    Able to rate subjective pain? yes  -MANNY      Pre-Treatment Pain Level 7  -MANNY      Post-Treatment Pain Level 7  -MANNY      Subjective Pain Comment L knee; B shoulders increased from 0 to \"1\" after she propelled optimally configured manual wheelchair.  -MANNY            User Key  (r) = Recorded By, (t) = Taken By, (c) = Cosigned By    Initials Name Provider Type    Sara Moura, PT Physical Therapist                            Outcome Measure Options: Tinetti, Timed Up and Go (TUG)  Tinetti Assessment  Tinetti Assessment: yes  Sitting Balance: Steady,safe  Arises: Able, uses arms  Attempts to Rise: Able, requires > 1 attempt  Immediate Standing Balance (first 5 sec): Steady, with support  Standing Balance: Steady, stance > 4 inch DESI & requires support  Sternal Nudge (feet close together): Begins to fall  Eyes Closed (feet close together): Steady  Turning 360 Degrees- Steps: Discontinuous steps  Turning 360 Degrees- Steadiness: Unsteady (staggers, grabs)  Sitting Down: Uses arms or not a smooth motion  Tinetti Balance Score: 7  Gait Initiation (immediate after told \"go\"): No hesitancy  Step Length- Right Swing: Right swing foot passes Left stance leg  Step Length- Left Swing: Left swing foot does not pass Right  Foot Clearance- Right Foot: Right foot does not completely clear floor  Foot Clearance- Left Foot: Left foot completely clears floor  Step Symmetry: Right and Left step length unequal  Step Continuity: Stop/discontinuity between steps  Path (excursion): Mild/moderate deviation or use of aid  Trunk: Marked sway or uses device  Base of Support: Heels apart  Gait Score: 4  Tinetti Total Score: 11  Tinetti Assistive Device: rollator (and R LE long leg brace)  Timed Up and Go " (TUG)  TUG Test 1: 29 seconds (using rollator)      Time Calculation:   Start Time: 1101  Stop Time: 1151  Time Calculation (min): 50 min  Total Timed Code Minutes- PT: 50 minute(s)  Timed Charges  86540 - Wheelchair Management Training: 15  Untimed Charges  PT Eval/Re-eval Minutes: 35  Total Minutes  Timed Charges Total Minutes: 15  Untimed Charges Total Minutes: 35   Total Minutes: 50     Therapy Charges for Today     Code Description Service Date Service Provider Modifiers Qty    81307256832 HC PT WHEELCHAIR MGMT EA 15 MIN 4/18/2022 Sara Yao, PT GP 1    [id number] HC PT EVAL MOD COMPLEXITY 3 4/18/2022 Sara Yao, PT GP 1          PT G-Codes  Outcome Measure Options: Tinetti, Timed Up and Go (TUG)  Tinetti Total Score: 11  TUG Test 1: 29 seconds (using rollator)     OP PT Discharge Summary  Date of Discharge: 04/18/22  Reason for Discharge: All goals achieved  Outcomes Achieved: Able to achieve all goals within established timeline  Discharge Destination: Home without follow-up    Patient was wearing a face mask during this therapy encounter. Therapist used appropriate personal protective equipment including mask and gloves.  Mask used was standard procedure mask. Appropriate PPE was worn during the entire therapy session. Hand hygiene was completed before and after therapy session. Patient is not in enhanced droplet precautions.         Sara Yao, PT  4/18/2022

## 2022-04-28 ENCOUNTER — APPOINTMENT (OUTPATIENT)
Dept: GENERAL RADIOLOGY | Facility: HOSPITAL | Age: 72
End: 2022-04-28

## 2022-04-28 ENCOUNTER — HOSPITAL ENCOUNTER (EMERGENCY)
Facility: HOSPITAL | Age: 72
Discharge: HOME | End: 2022-04-28
Attending: EMERGENCY MEDICINE | Admitting: EMERGENCY MEDICINE

## 2022-04-28 VITALS
BODY MASS INDEX: 28.66 KG/M2 | HEART RATE: 59 BPM | SYSTOLIC BLOOD PRESSURE: 154 MMHG | DIASTOLIC BLOOD PRESSURE: 89 MMHG | HEIGHT: 65 IN | WEIGHT: 172 LBS | TEMPERATURE: 98.1 F | RESPIRATION RATE: 16 BRPM | OXYGEN SATURATION: 95 %

## 2022-04-28 DIAGNOSIS — R09.89 GLOBUS SENSATION: ICD-10-CM

## 2022-04-28 DIAGNOSIS — J06.9 VIRAL URI WITH COUGH: Primary | ICD-10-CM

## 2022-04-28 DIAGNOSIS — R11.2 NAUSEA AND VOMITING, UNSPECIFIED VOMITING TYPE: ICD-10-CM

## 2022-04-28 LAB
ALBUMIN SERPL-MCNC: 3.9 G/DL (ref 3.5–5.2)
ALBUMIN/GLOB SERPL: 1.6 G/DL
ALP SERPL-CCNC: 44 U/L (ref 39–117)
ALT SERPL W P-5'-P-CCNC: 36 U/L (ref 1–33)
ANION GAP SERPL CALCULATED.3IONS-SCNC: 10.3 MMOL/L (ref 5–15)
AST SERPL-CCNC: 26 U/L (ref 1–32)
B PARAPERT DNA SPEC QL NAA+PROBE: NOT DETECTED
B PERT DNA SPEC QL NAA+PROBE: NOT DETECTED
BASOPHILS # BLD AUTO: 0.02 10*3/MM3 (ref 0–0.2)
BASOPHILS NFR BLD AUTO: 0.2 % (ref 0–1.5)
BILIRUB SERPL-MCNC: 0.3 MG/DL (ref 0–1.2)
BUN SERPL-MCNC: 15 MG/DL (ref 8–23)
BUN/CREAT SERPL: 18.1 (ref 7–25)
C PNEUM DNA NPH QL NAA+NON-PROBE: NOT DETECTED
CALCIUM SPEC-SCNC: 8.8 MG/DL (ref 8.6–10.5)
CHLORIDE SERPL-SCNC: 105 MMOL/L (ref 98–107)
CO2 SERPL-SCNC: 24.7 MMOL/L (ref 22–29)
CREAT SERPL-MCNC: 0.83 MG/DL (ref 0.57–1)
DEPRECATED RDW RBC AUTO: 44.6 FL (ref 37–54)
EGFRCR SERPLBLD CKD-EPI 2021: 75.9 ML/MIN/1.73
EOSINOPHIL # BLD AUTO: 0.02 10*3/MM3 (ref 0–0.4)
EOSINOPHIL NFR BLD AUTO: 0.2 % (ref 0.3–6.2)
ERYTHROCYTE [DISTWIDTH] IN BLOOD BY AUTOMATED COUNT: 13.4 % (ref 12.3–15.4)
FLUAV SUBTYP SPEC NAA+PROBE: NOT DETECTED
FLUBV RNA ISLT QL NAA+PROBE: NOT DETECTED
GLOBULIN UR ELPH-MCNC: 2.5 GM/DL
GLUCOSE SERPL-MCNC: 107 MG/DL (ref 65–99)
HADV DNA SPEC NAA+PROBE: NOT DETECTED
HCOV 229E RNA SPEC QL NAA+PROBE: NOT DETECTED
HCOV HKU1 RNA SPEC QL NAA+PROBE: NOT DETECTED
HCOV NL63 RNA SPEC QL NAA+PROBE: NOT DETECTED
HCOV OC43 RNA SPEC QL NAA+PROBE: NOT DETECTED
HCT VFR BLD AUTO: 44.7 % (ref 34–46.6)
HGB BLD-MCNC: 14.8 G/DL (ref 12–15.9)
HMPV RNA NPH QL NAA+NON-PROBE: NOT DETECTED
HPIV1 RNA ISLT QL NAA+PROBE: NOT DETECTED
HPIV2 RNA SPEC QL NAA+PROBE: NOT DETECTED
HPIV3 RNA NPH QL NAA+PROBE: DETECTED
HPIV4 P GENE NPH QL NAA+PROBE: NOT DETECTED
IMM GRANULOCYTES # BLD AUTO: 0.07 10*3/MM3 (ref 0–0.05)
IMM GRANULOCYTES NFR BLD AUTO: 0.9 % (ref 0–0.5)
LIPASE SERPL-CCNC: 45 U/L (ref 13–60)
LYMPHOCYTES # BLD AUTO: 0.6 10*3/MM3 (ref 0.7–3.1)
LYMPHOCYTES NFR BLD AUTO: 7.4 % (ref 19.6–45.3)
M PNEUMO IGG SER IA-ACNC: NOT DETECTED
MCH RBC QN AUTO: 29.8 PG (ref 26.6–33)
MCHC RBC AUTO-ENTMCNC: 33.1 G/DL (ref 31.5–35.7)
MCV RBC AUTO: 90.1 FL (ref 79–97)
MONOCYTES # BLD AUTO: 0.71 10*3/MM3 (ref 0.1–0.9)
MONOCYTES NFR BLD AUTO: 8.8 % (ref 5–12)
NEUTROPHILS NFR BLD AUTO: 6.67 10*3/MM3 (ref 1.7–7)
NEUTROPHILS NFR BLD AUTO: 82.5 % (ref 42.7–76)
NRBC BLD AUTO-RTO: 0 /100 WBC (ref 0–0.2)
PLATELET # BLD AUTO: 192 10*3/MM3 (ref 140–450)
PMV BLD AUTO: 9.9 FL (ref 6–12)
POTASSIUM SERPL-SCNC: 4.4 MMOL/L (ref 3.5–5.2)
PROT SERPL-MCNC: 6.4 G/DL (ref 6–8.5)
QT INTERVAL: 482 MS
RBC # BLD AUTO: 4.96 10*6/MM3 (ref 3.77–5.28)
RHINOVIRUS RNA SPEC NAA+PROBE: NOT DETECTED
RSV RNA NPH QL NAA+NON-PROBE: NOT DETECTED
SARS-COV-2 RNA NPH QL NAA+NON-PROBE: NOT DETECTED
SODIUM SERPL-SCNC: 140 MMOL/L (ref 136–145)
TROPONIN T SERPL-MCNC: <0.01 NG/ML (ref 0–0.03)
WBC NRBC COR # BLD: 8.09 10*3/MM3 (ref 3.4–10.8)

## 2022-04-28 PROCEDURE — 83690 ASSAY OF LIPASE: CPT | Performed by: EMERGENCY MEDICINE

## 2022-04-28 PROCEDURE — 93005 ELECTROCARDIOGRAM TRACING: CPT | Performed by: EMERGENCY MEDICINE

## 2022-04-28 PROCEDURE — 84484 ASSAY OF TROPONIN QUANT: CPT | Performed by: EMERGENCY MEDICINE

## 2022-04-28 PROCEDURE — 25010000002 ONDANSETRON PER 1 MG: Performed by: EMERGENCY MEDICINE

## 2022-04-28 PROCEDURE — 99284 EMERGENCY DEPT VISIT MOD MDM: CPT

## 2022-04-28 PROCEDURE — 96361 HYDRATE IV INFUSION ADD-ON: CPT

## 2022-04-28 PROCEDURE — 93010 ELECTROCARDIOGRAM REPORT: CPT | Performed by: INTERNAL MEDICINE

## 2022-04-28 PROCEDURE — 96374 THER/PROPH/DIAG INJ IV PUSH: CPT

## 2022-04-28 PROCEDURE — 0202U NFCT DS 22 TRGT SARS-COV-2: CPT | Performed by: EMERGENCY MEDICINE

## 2022-04-28 PROCEDURE — 70360 X-RAY EXAM OF NECK: CPT

## 2022-04-28 PROCEDURE — 80053 COMPREHEN METABOLIC PANEL: CPT | Performed by: EMERGENCY MEDICINE

## 2022-04-28 PROCEDURE — 85025 COMPLETE CBC W/AUTO DIFF WBC: CPT | Performed by: EMERGENCY MEDICINE

## 2022-04-28 RX ORDER — ONDANSETRON 4 MG/1
4 TABLET, ORALLY DISINTEGRATING ORAL EVERY 8 HOURS PRN
Qty: 10 TABLET | Refills: 0 | Status: SHIPPED | OUTPATIENT
Start: 2022-04-28

## 2022-04-28 RX ORDER — ONDANSETRON 2 MG/ML
4 INJECTION INTRAMUSCULAR; INTRAVENOUS ONCE
Status: COMPLETED | OUTPATIENT
Start: 2022-04-28 | End: 2022-04-28

## 2022-04-28 RX ORDER — SODIUM CHLORIDE 0.9 % (FLUSH) 0.9 %
10 SYRINGE (ML) INJECTION AS NEEDED
Status: DISCONTINUED | OUTPATIENT
Start: 2022-04-28 | End: 2022-04-28 | Stop reason: HOSPADM

## 2022-04-28 RX ORDER — SODIUM CHLORIDE 9 MG/ML
125 INJECTION, SOLUTION INTRAVENOUS CONTINUOUS
Status: DISCONTINUED | OUTPATIENT
Start: 2022-04-28 | End: 2022-04-28 | Stop reason: HOSPADM

## 2022-04-28 RX ADMIN — SODIUM CHLORIDE 125 ML/HR: 9 INJECTION, SOLUTION INTRAVENOUS at 18:23

## 2022-04-28 RX ADMIN — ONDANSETRON 4 MG: 2 INJECTION INTRAMUSCULAR; INTRAVENOUS at 16:43

## 2022-04-28 RX ADMIN — SODIUM CHLORIDE 1000 ML: 9 INJECTION, SOLUTION INTRAVENOUS at 16:42

## 2022-05-05 ENCOUNTER — OFFICE VISIT (OUTPATIENT)
Dept: ORTHOPEDIC SURGERY | Facility: CLINIC | Age: 72
End: 2022-05-05

## 2022-05-05 VITALS — TEMPERATURE: 97.7 F | WEIGHT: 170 LBS | HEIGHT: 65 IN | BODY MASS INDEX: 28.32 KG/M2

## 2022-05-05 DIAGNOSIS — B91 POST-POLIO LIMB MUSCLE WEAKNESS: Primary | ICD-10-CM

## 2022-05-05 DIAGNOSIS — M62.81 POST-POLIO LIMB MUSCLE WEAKNESS: Primary | ICD-10-CM

## 2022-05-05 PROCEDURE — 99213 OFFICE O/P EST LOW 20 MIN: CPT | Performed by: ORTHOPAEDIC SURGERY

## 2022-05-05 RX ORDER — SULINDAC 200 MG/1
200 TABLET ORAL
COMMUNITY
Start: 2022-04-26

## 2022-05-23 ENCOUNTER — TRANSCRIBE ORDERS (OUTPATIENT)
Dept: ADMINISTRATIVE | Facility: HOSPITAL | Age: 72
End: 2022-05-23

## 2022-05-23 DIAGNOSIS — Z12.31 VISIT FOR SCREENING MAMMOGRAM: Primary | ICD-10-CM

## 2022-07-28 ENCOUNTER — TELEPHONE (OUTPATIENT)
Dept: ORTHOPEDIC SURGERY | Facility: CLINIC | Age: 72
End: 2022-07-28

## 2022-07-28 NOTE — TELEPHONE ENCOUNTER
Caller: Irlanda Henry    Relationship: Self    Best call back number: [phone].    What form or medical record are you requesting: ORDER THAT WAS WRITTEN FOR ELECTRIC CHAIR     Who is requesting this form or medical record from you: PATIENT    How would you like to receive the form or medical records (pick-up, mail, fax): MAIL TO PT'S HOME    Timeframe paperwork needed: ASAP    Additional notes: PT IS HAVING PROBLEMS W/ GOLD'S. THEY BROUGHT ONE CHAIR OUT TO TEST AND THEN DELIVERED ANOTHER THAT IS TOO LARGE TO MANEUVER AROUND HER HOME. THEY ARE SAYING THAT SHE CAN'T HAVE THE GO CHAIR OR SMALLER CHAIR DUE TO THE ORDER. THE PATIENT WOULD LIKE TO HAVE A COPY OF THE ORDER. PLEASE CALL PT TO DISCUSS.

## 2022-08-15 ENCOUNTER — HOSPITAL ENCOUNTER (OUTPATIENT)
Dept: MAMMOGRAPHY | Facility: HOSPITAL | Age: 72
Discharge: HOME | End: 2022-08-15
Admitting: NURSE PRACTITIONER

## 2022-08-15 DIAGNOSIS — Z12.31 VISIT FOR SCREENING MAMMOGRAM: ICD-10-CM

## 2022-08-15 PROCEDURE — 77063 BREAST TOMOSYNTHESIS BI: CPT

## 2022-08-15 PROCEDURE — 77067 SCR MAMMO BI INCL CAD: CPT

## 2022-08-17 NOTE — TELEPHONE ENCOUNTER
ATTEMPTED TO WARM TRANSFER - PATIENT CALLED AGAIN TODAY NEEDING STATUS OF THE LETTER OR SCRIPT THAT DR SON HAS WRITTEN FOR THE SCOOTER/CHAIR. PATIENT NEEDS IT TO BE SENT TO INS (IF NOT ALREADY) AND NEEDS A COPY SENT TO HER, AS WELL.  AT TIME OF CALL NOTHING IN MEDIA, LETTERS, OR NOTES.   PER PATIENT THIS HAS BEEN SINCE 07 28 22 AND STILL HASN'T RCVD A CALL BACK AND (ADVISES TO CALL HER CELL PHONE) WITH THE STATUS OF HER REQUEST.  HUB ADVISED WOULD LET CLINICAL KNOW, SO SOMEONE COULD CALL HER BACK.

## 2022-08-20 NOTE — PROGRESS NOTES
Patient returns she had a CT of her right lower extremity.  She does have degenerative changes and she has staples throughout the knee and I explained to her that I think given the entirety of the situation surgical reconstruction in the form of knee replacement would be high risk and I am not recommending it.  I think she would be better off using a power scooter and drop lock knee brace.  I will help facilitate that.  I will see her back as needed.

## 2023-03-07 ENCOUNTER — OFFICE VISIT (OUTPATIENT)
Dept: INTERNAL MEDICINE | Facility: CLINIC | Age: 73
End: 2023-03-07
Payer: MEDICARE

## 2023-03-07 VITALS
HEART RATE: 81 BPM | DIASTOLIC BLOOD PRESSURE: 72 MMHG | WEIGHT: 172.2 LBS | TEMPERATURE: 97.8 F | HEIGHT: 65 IN | OXYGEN SATURATION: 99 % | SYSTOLIC BLOOD PRESSURE: 142 MMHG | BODY MASS INDEX: 28.69 KG/M2

## 2023-03-07 DIAGNOSIS — M62.81 POST-POLIO LIMB MUSCLE WEAKNESS: Chronic | ICD-10-CM

## 2023-03-07 DIAGNOSIS — Z12.11 COLON CANCER SCREENING: ICD-10-CM

## 2023-03-07 DIAGNOSIS — M25.532 LEFT WRIST PAIN: ICD-10-CM

## 2023-03-07 DIAGNOSIS — R53.83 OTHER FATIGUE: ICD-10-CM

## 2023-03-07 DIAGNOSIS — R10.819 SUPRAPUBIC TENDERNESS: Primary | ICD-10-CM

## 2023-03-07 DIAGNOSIS — B91 POST-POLIO LIMB MUSCLE WEAKNESS: Chronic | ICD-10-CM

## 2023-03-07 LAB
ALBUMIN SERPL-MCNC: 4.5 G/DL (ref 3.5–5.2)
ALBUMIN/GLOB SERPL: 2.1 G/DL
ALP SERPL-CCNC: 51 U/L (ref 39–117)
ALT SERPL-CCNC: 19 U/L (ref 1–33)
AST SERPL-CCNC: 18 U/L (ref 1–32)
BASOPHILS # BLD AUTO: 0.04 10*3/MM3 (ref 0–0.2)
BASOPHILS NFR BLD AUTO: 0.8 % (ref 0–1.5)
BILIRUB SERPL-MCNC: 0.4 MG/DL (ref 0–1.2)
BILIRUB UR QL STRIP: NEGATIVE
BUN SERPL-MCNC: 12 MG/DL (ref 8–23)
BUN/CREAT SERPL: 16.7 (ref 7–25)
CALCIUM SERPL-MCNC: 9.7 MG/DL (ref 8.6–10.5)
CHLORIDE SERPL-SCNC: 105 MMOL/L (ref 98–107)
CLARITY UR: CLEAR
CO2 SERPL-SCNC: 27.9 MMOL/L (ref 22–29)
COLOR UR: YELLOW
CREAT SERPL-MCNC: 0.72 MG/DL (ref 0.57–1)
EGFRCR SERPLBLD CKD-EPI 2021: 89.5 ML/MIN/1.73
EOSINOPHIL # BLD AUTO: 0.04 10*3/MM3 (ref 0–0.4)
EOSINOPHIL NFR BLD AUTO: 0.8 % (ref 0.3–6.2)
ERYTHROCYTE [DISTWIDTH] IN BLOOD BY AUTOMATED COUNT: 12.7 % (ref 12.3–15.4)
GLOBULIN SER CALC-MCNC: 2.1 GM/DL
GLUCOSE SERPL-MCNC: 95 MG/DL (ref 65–99)
GLUCOSE UR STRIP-MCNC: NEGATIVE MG/DL
HCT VFR BLD AUTO: 42.3 % (ref 34–46.6)
HGB BLD-MCNC: 14.5 G/DL (ref 12–15.9)
HGB UR QL STRIP.AUTO: NEGATIVE
IMM GRANULOCYTES # BLD AUTO: 0.02 10*3/MM3 (ref 0–0.05)
IMM GRANULOCYTES NFR BLD AUTO: 0.4 % (ref 0–0.5)
KETONES UR QL STRIP: NEGATIVE
LEUKOCYTE ESTERASE UR QL STRIP.AUTO: NEGATIVE
LYMPHOCYTES # BLD AUTO: 0.74 10*3/MM3 (ref 0.7–3.1)
LYMPHOCYTES NFR BLD AUTO: 15.4 % (ref 19.6–45.3)
MCH RBC QN AUTO: 30.8 PG (ref 26.6–33)
MCHC RBC AUTO-ENTMCNC: 34.3 G/DL (ref 31.5–35.7)
MCV RBC AUTO: 89.8 FL (ref 79–97)
MONOCYTES # BLD AUTO: 0.43 10*3/MM3 (ref 0.1–0.9)
MONOCYTES NFR BLD AUTO: 8.9 % (ref 5–12)
NEUTROPHILS # BLD AUTO: 3.54 10*3/MM3 (ref 1.7–7)
NEUTROPHILS NFR BLD AUTO: 73.7 % (ref 42.7–76)
NITRITE UR QL STRIP: NEGATIVE
NRBC BLD AUTO-RTO: 0 /100 WBC (ref 0–0.2)
PH UR STRIP.AUTO: 5.5 [PH] (ref 5–8)
PLATELET # BLD AUTO: 204 10*3/MM3 (ref 140–450)
POTASSIUM SERPL-SCNC: 4.6 MMOL/L (ref 3.5–5.2)
PROT SERPL-MCNC: 6.6 G/DL (ref 6–8.5)
PROT UR QL STRIP: NEGATIVE
RBC # BLD AUTO: 4.71 10*6/MM3 (ref 3.77–5.28)
SODIUM SERPL-SCNC: 141 MMOL/L (ref 136–145)
SP GR UR STRIP: 1.01 (ref 1–1.03)
T4 FREE SERPL-MCNC: 1.21 NG/DL (ref 0.93–1.7)
TSH SERPL DL<=0.005 MIU/L-ACNC: 3.55 UIU/ML (ref 0.27–4.2)
UROBILINOGEN UR QL STRIP: NORMAL
WBC # BLD AUTO: 4.81 10*3/MM3 (ref 3.4–10.8)

## 2023-03-07 PROCEDURE — 81003 URINALYSIS AUTO W/O SCOPE: CPT | Performed by: NURSE PRACTITIONER

## 2023-03-07 PROCEDURE — 99214 OFFICE O/P EST MOD 30 MIN: CPT | Performed by: NURSE PRACTITIONER

## 2023-03-07 NOTE — PROGRESS NOTES
"        Chief Complaint  Arm Pain (Left arm ) and Abdominal Pain (No pain feels like a sensation. 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 Right long leg brace    She has chronic knee pain and states Dr Patel has advised against surgery.       Today:   Left wrist pain (medial aspect) : greater than one week, dull ache.  No known injury.  She does have a hx of gout.  No redness or swelling.       \"pressure wave\" in suprapubic area for 2-3 weeks   Tried: Hot compresses, different soap, no change. No blood in urine.   She has been searching on the Internet and concerned about colon cancer.   In the past, she has declined screen.   Today states she would not have a scope.  No dark or bloody stools. No unexplained weight loss.   No family hx.            Answers for HPI/ROS submitted by the patient on 3/2/2023  Please describe your symptoms.: Left wrist, other concerns  Have you had these symptoms before?: No  How long have you been having these symptoms?: Greater than 2 weeks  Please list any medications you are currently taking for this condition.: None  Please describe any probable cause for these symptoms. : None  What is the primary reason for your visit?: Other        I have reviewed patient's medical history, any new submitted information provided by patient or medical assistant and updated medical record.      Objective:      Physical Exam  Vitals reviewed.   Constitutional:       Appearance: Normal appearance. She is well-developed.   HENT:      Head:      Comments: Wearing mask due to COVID   Neck:      Thyroid: No thyromegaly.   Cardiovascular:      Rate and Rhythm: Normal rate and regular rhythm.      Pulses: " "Normal pulses.      Heart sounds: Normal heart sounds.   Pulmonary:      Effort: Pulmonary effort is normal.      Breath sounds: Normal breath sounds.      Comments: E/U   Abdominal:      Palpations: There is no mass.      Tenderness: There is abdominal tenderness in the suprapubic area.   Musculoskeletal:      Left wrist: No swelling, deformity or crepitus. Normal range of motion.   Skin:     General: Skin is warm and dry.   Neurological:      Mental Status: She is alert and oriented to person, place, and time.   Psychiatric:         Behavior: Behavior is cooperative.        Result Review  Data Reviewed:{ Labs  Result Review  Imaging  Med Tab  Media :23}     The following data was reviewed by: Carlitos Shah III, NP-C on 03/07/2023  Common labs    Common Labs 4/28/22 4/28/22 3/7/23 3/7/23    1640 1640 1538 1538   Glucose  107 (A) 95    BUN  15 12    Creatinine  0.83 0.72    Sodium  140 141    Potassium  4.4 4.6    Chloride  105 105    Calcium  8.8 9.7    Total Protein   6.6    Albumin  3.90 4.5    Total Bilirubin  0.3 0.4    Alkaline Phosphatase  44 51    AST (SGOT)  26 18    ALT (SGPT)  36 (A) 19    WBC 8.09   4.81   Hemoglobin 14.8   14.5   Hematocrit 44.7   42.3   Platelets 192   204   (A) Abnormal value       Comments are available for some flowsheets but are not being displayed.                  Vital Signs:   /72 (BP Location: Right arm, Patient Position: Sitting, Cuff Size: Adult)   Pulse 81   Temp 97.8 °F (36.6 °C) (Temporal)   Ht 165.1 cm (65\")   Wt 78.1 kg (172 lb 3.2 oz)   SpO2 99%   BMI 28.66 kg/m²         Requested Prescriptions      No prescriptions requested or ordered in this encounter       Routine medications provided by this office will also be refilled via pharmacy request.       Current Outpatient Medications:   •  ondansetron ODT (ZOFRAN-ODT) 4 MG disintegrating tablet, Place 1 tablet on the tongue Every 8 (Eight) Hours As Needed for Nausea or Vomiting., Disp: 10 " tablet, Rfl: 0  •  sulindac (CLINORIL) 200 MG tablet, Take 1 tablet by mouth., Disp: , Rfl:      Assessment and Plan:      Assessment and Plan {CC Problem List  Visit Diagnosis  ROS  Review (Popup)  Health Maintenance  Quality  BestPractice  Medications  SmartSets  SnapShot Encounters  Media :23}     Problem List Items Addressed This Visit        Musculoskeletal and Injuries    Post-polio limb muscle weakness (Chronic)    Overview     Right lower extremity weakness: wears brace         Other Visit Diagnoses     Suprapubic tenderness    -  Primary    Relevant Orders    Urinalysis With Culture If Indicated - Urine, Clean Catch (Completed)    CBC w AUTO Differential (Completed)    Left wrist pain        Relevant Orders    XR Wrist 3+ View Left    Other fatigue        Relevant Orders    Comprehensive Metabolic Panel (Completed)    TSH (Completed)    T4, free (Completed)    Colon cancer screening        Relevant Orders    Cologuard - Stool, Per Rectum          Will get labs and urine and notify her of results.   She agrees to cologuard.       Left wrist: she has ROM.  No redness or swelling.  Will check xray and notify her of results.  May need to see hand clinic.     I spent 32 minutes caring for Irlanda on this date of service. This time includes time spent by me in the following activities: preparing for the visit, reviewing tests, obtaining and/or reviewing a separately obtained history, performing a medically appropriate examination and/or evaluation, ordering medications, tests, or procedures and documenting information in the medical record    Follow Up {Instructions Charge Capture  Follow-up Communications :23}     Return in about 3 months (around 6/7/2023) for Medicare Wellness.      Patient was given instructions and counseling regarding her condition or for health maintenance advice. Please see specific information pulled into the AVS if appropriate.    Basia disclaimer:   Much of this encounter  note is an electronic transcription/translation of spoken language to printed text. The electronic translation of spoken language may permit erroneous, or at times, nonsensical words or phrases to be inadvertently transcribed; Although I have reviewed the note for such errors, some may still exist.     Additional Patient Counseling:       There are no Patient Instructions on file for this visit.

## 2023-03-10 ENCOUNTER — TELEPHONE (OUTPATIENT)
Dept: INTERNAL MEDICINE | Facility: CLINIC | Age: 73
End: 2023-03-10
Payer: MEDICARE

## 2023-03-10 NOTE — TELEPHONE ENCOUNTER
Patient transferred from [facility]. She states she is concerned about one of her blood test was abnormal(lymphocyte). She said they all were normal but the one that was not she has looked up and it can reflect cancer. Also worried about cologuard she has not received it yet. I informed her the company ships them out, but I would refax the order..

## 2023-03-13 ENCOUNTER — TELEPHONE (OUTPATIENT)
Dept: INTERNAL MEDICINE | Facility: CLINIC | Age: 73
End: 2023-03-13
Payer: MEDICARE

## 2023-03-13 NOTE — TELEPHONE ENCOUNTER
CAM OKAY TO READ       ----- Message from GHASSAN Newton III sent at 3/10/2023 12:23 PM EST -----  Please call her -   Lab work for her looks similar to her prior labs.   Her relative absolute lymphocytes are minimally low, but when looking at the absolute level, it is normal.      Her thyroid level was normal and urine was normal.     I will let her know results of cologuard once receives kit and sends in.     CANDY

## 2023-03-14 ENCOUNTER — HOSPITAL ENCOUNTER (OUTPATIENT)
Dept: GENERAL RADIOLOGY | Facility: HOSPITAL | Age: 73
Discharge: HOME | End: 2023-03-14
Admitting: NURSE PRACTITIONER
Payer: MEDICARE

## 2023-03-14 DIAGNOSIS — M25.532 LEFT WRIST PAIN: Primary | ICD-10-CM

## 2023-03-14 PROCEDURE — 73110 X-RAY EXAM OF WRIST: CPT

## 2023-03-20 ENCOUNTER — TELEPHONE (OUTPATIENT)
Dept: INTERNAL MEDICINE | Facility: CLINIC | Age: 73
End: 2023-03-20
Payer: MEDICARE

## 2023-03-20 NOTE — TELEPHONE ENCOUNTER
Caller: Irlanda Henry    Relationship: Self    Best call back number: [phone]    What is the best time to reach you: ASAP    Who are you requesting to speak with (clinical staff, provider,  specific staff member): NPIRINEO ROCA    Do you know the name of the person who called: SELF    What was the call regarding: THE PATIENT STATES THAT SHE BELIEVES THE SYMPTOMS SHE WAS SEEN FOR EARLIER THIS MONTH ARE GETTING WORSE. THE PATIENT STATES THAT SHE IS EXPERIENCING AN INCREASE IN FATIGUE AND THE RASH AROUND HER PUBIC AREA IS STILL PRESENT ALONG WITH ABDOMINAL DISCOMFORT. THE PATIENT IS CONCERNED AND SPECIFICALLY WANTS TO SPEAK WITH SARAH ROCA.     Do you require a callback: YES

## 2023-04-20 ENCOUNTER — TELEPHONE (OUTPATIENT)
Dept: GASTROENTEROLOGY | Facility: CLINIC | Age: 73
End: 2023-04-20

## 2023-04-20 NOTE — TELEPHONE ENCOUNTER
Hub staff attempted to follow warm transfer process and was unsuccessful     Caller: Irlanda Henry    Relationship to patient: Self    Best call back number: [phone]    Patient is needing: PT CALLING TO SCHED NEW PT APPT WITH DR. VARGAS FROM REFERRAL. NEXT AVAILABLE IS 6/17 AND PT IS WANTING TO BE SEEN SOONER. STATED SHE IS HAVING SYMPTOMS IN LOWER ABDOMEN AND HAVING PRESSURE THERE AND HAS PUMPS UNDER HER SKIN

## 2023-06-13 ENCOUNTER — OFFICE VISIT (OUTPATIENT)
Dept: GASTROENTEROLOGY | Facility: CLINIC | Age: 73
End: 2023-06-13
Payer: MEDICARE

## 2023-06-13 ENCOUNTER — TELEPHONE (OUTPATIENT)
Dept: GASTROENTEROLOGY | Facility: CLINIC | Age: 73
End: 2023-06-13

## 2023-06-13 VITALS
DIASTOLIC BLOOD PRESSURE: 85 MMHG | SYSTOLIC BLOOD PRESSURE: 144 MMHG | OXYGEN SATURATION: 98 % | HEART RATE: 78 BPM | HEIGHT: 65 IN | BODY MASS INDEX: 28.32 KG/M2 | WEIGHT: 170 LBS | TEMPERATURE: 98.6 F

## 2023-06-13 DIAGNOSIS — R10.30 LOWER ABDOMINAL PAIN: Primary | ICD-10-CM

## 2023-06-13 PROCEDURE — 1160F RVW MEDS BY RX/DR IN RCRD: CPT | Performed by: INTERNAL MEDICINE

## 2023-06-13 PROCEDURE — 1159F MED LIST DOCD IN RCRD: CPT | Performed by: INTERNAL MEDICINE

## 2023-06-13 PROCEDURE — 99204 OFFICE O/P NEW MOD 45 MIN: CPT | Performed by: INTERNAL MEDICINE

## 2023-06-13 RX ORDER — SODIUM CHLORIDE, SODIUM LACTATE, POTASSIUM CHLORIDE, CALCIUM CHLORIDE 600; 310; 30; 20 MG/100ML; MG/100ML; MG/100ML; MG/100ML
30 INJECTION, SOLUTION INTRAVENOUS CONTINUOUS
OUTPATIENT
Start: 2023-06-13

## 2023-06-13 NOTE — PROGRESS NOTES
"Chief Complaint   Patient presents with    Bloated    Abdominal Pain     Subjective   HPI  Irlanda Henry is a 72 y.o. female who presents today for new patient evaluation.     She describes feeling a \"wave\" or heaviness in her suprapubic area  Incomplete defecation.  Taking stool softener every 3 days or so.      Very remote colonoscopy    Hx of polio with residual L sided weakness    Objective   Vitals:    06/13/23 1457   BP: 144/85   Pulse: 78   Temp: 98.6 °F (37 °C)   SpO2: 98%     Physical Exam  Vitals reviewed.   Constitutional:       Appearance: She is well-developed.   HENT:      Head: Normocephalic and atraumatic.      Mouth/Throat:      Mouth: Mucous membranes are moist.   Cardiovascular:      Rate and Rhythm: Normal rate and regular rhythm.   Pulmonary:      Effort: Pulmonary effort is normal. No respiratory distress.      Breath sounds: Normal breath sounds.   Abdominal:      General: Bowel sounds are normal. There is no distension.      Palpations: Abdomen is soft.      Tenderness: There is no abdominal tenderness.   Skin:     General: Skin is warm and dry.   Neurological:      General: No focal deficit present.      Mental Status: She is alert and oriented to person, place, and time.   Psychiatric:         Mood and Affect: Mood normal.         Behavior: Behavior normal.         Thought Content: Thought content normal.          Assessment & Plan   Assessment:     1. Lower abdominal pain    2.     Change in bowel habit    Plan:   Recommend taking stool softener daily  Case request for colonoscopy submitted.  Will also order CT A/P to evaluate for non GI causes of her lower abdominal pain given location.           Patricio Ulloa M.D.  [facility]  [address] [place] [address]  Office: [phone]  "

## 2023-06-13 NOTE — TELEPHONE ENCOUNTER
Caller: Irlanda Henry    Relationship: Self    Best call back number: [phone]    What is the best time to reach you: ANYTIME    Who are you requesting to speak with (clinical staff, provider,  specific staff member): CLINICAL    Do you know the name of the person who called: [facility]    PATIENT CALLED STATING SHE NEEDED TO SPEAK WITH [facility] ABOUT SETTING UP AN APPT. HUB STAFF ATTEMPTED TO FOLLOW WARM TRANSFER PROCESS BUT WAS UNSUCCESSFUL.

## 2023-06-14 ENCOUNTER — TELEPHONE (OUTPATIENT)
Dept: GASTROENTEROLOGY | Facility: CLINIC | Age: 73
End: 2023-06-14
Payer: MEDICARE

## 2023-06-14 NOTE — TELEPHONE ENCOUNTER
CALLER: Irlanda Henry    RELATIONSHIP TO PATIENT: Self    BEST CALL BACK NUMBER: [phone]    CALL REGARDING: Needing More information in reference to colonoscopy and prep done at hospital. Patient Seen yesterday with Dr. Ulloa and is in a wheel chair and would be unable to complete prep at home

## 2023-06-14 NOTE — TELEPHONE ENCOUNTER
Caller: Irlanda Henry    Relationship to patient: Self    Best call back number: [phone]    Patient is needing: UNABLE TO WARM TRANSFER TO OFFICE, PATIENT CALLED WANTED TO SPEAK DIRECTLY TO DESIRE REGARDING TESTING TO BE DONE AT THE HOSPITAL 6-. PLEASE RETURN A CALL AT THE NUMBER LISTED ABOVE. SHE CAN BE REACHED ANYTIME.

## 2023-06-16 ENCOUNTER — TELEPHONE (OUTPATIENT)
Dept: GASTROENTEROLOGY | Facility: CLINIC | Age: 73
End: 2023-06-16
Payer: MEDICARE

## 2023-06-16 PROBLEM — R10.30 LOWER ABDOMINAL PAIN: Status: ACTIVE | Noted: 2023-06-16

## 2023-06-16 NOTE — TELEPHONE ENCOUNTER
Spoke to Margareth from bed board and informed her that patient only had use of one leg and did not have help at home with prep.

## 2023-06-16 NOTE — TELEPHONE ENCOUNTER
Dr. Melara with [facility] called back and he said bed board had to call with the insurance company to see if they would cover it.

## 2023-06-16 NOTE — TELEPHONE ENCOUNTER
Spoke with Kinza from bed board. Patient is able to be admitted for scope. Instructed to call [facility] closer to scope to have her admitted.     Patient is scheduled for scope

## 2023-06-16 NOTE — TELEPHONE ENCOUNTER
Hub staff attempted to follow warm transfer process and was unsuccessful     Caller: SHEA    Relationship to patient: [facility]    Best call back number: [phone]     Patient is needing: ALESSANDRA FROM [facility] CALLED RE: DIRECT ADMIT TO HOSPITAL FOR PT. APARICIO HAS QUESTIONS RE: THE INSURANCE BEFORE SHE CAN BOOK THE BED FOR THE PT. PLEASE CALL BACK.

## 2023-06-16 NOTE — TELEPHONE ENCOUNTER
Call to [facility] and left a vm to call back.      Below is from Dr. Ulloa:  I'll ask my nursing staff to reach out to [facility] to see if patient would qualify for direct admission for colonoscopy.  How did she do her prior colonoscopy, did she have to be admitted to the hospital at that time?         From Jero:  Dr Ulloa, Ms Henry called back and was very upset that we can not admit her for her colonoscopy. She stated to me that when she saw you in office that told you that she can not use her left leg at all for any reason.  She stated that she can not lift her left leg, she cant wiggle her foot nor her toes.   She feels that is it very unsafe for her to try and prep for  the colonoscopy from her electric wheel chair. She stated that she has no one to help her at home an that all her children work day shifts and can not get off. I offered to wait til she gets with her children and see what dates they are able to take off so that I could scheduled from their availability but she did not want that option.

## 2023-06-19 ENCOUNTER — TELEPHONE (OUTPATIENT)
Dept: GASTROENTEROLOGY | Facility: CLINIC | Age: 73
End: 2023-06-19
Payer: MEDICARE

## 2023-06-19 ENCOUNTER — OFFICE VISIT (OUTPATIENT)
Dept: INTERNAL MEDICINE | Facility: CLINIC | Age: 73
End: 2023-06-19
Payer: MEDICARE

## 2023-06-19 VITALS
HEIGHT: 65 IN | BODY MASS INDEX: 28.66 KG/M2 | WEIGHT: 172 LBS | SYSTOLIC BLOOD PRESSURE: 120 MMHG | DIASTOLIC BLOOD PRESSURE: 82 MMHG

## 2023-06-19 DIAGNOSIS — Z00.00 MEDICARE ANNUAL WELLNESS VISIT, SUBSEQUENT: Primary | ICD-10-CM

## 2023-06-19 DIAGNOSIS — K59.00 CONSTIPATION, UNSPECIFIED CONSTIPATION TYPE: ICD-10-CM

## 2023-06-19 DIAGNOSIS — M62.81 POST-POLIO LIMB MUSCLE WEAKNESS: Chronic | ICD-10-CM

## 2023-06-19 DIAGNOSIS — B91 POST-POLIO LIMB MUSCLE WEAKNESS: Chronic | ICD-10-CM

## 2023-06-19 PROBLEM — M94.0 COSTOCHONDRITIS, ACUTE: Status: RESOLVED | Noted: 2017-09-01 | Resolved: 2023-06-19

## 2023-06-19 PROCEDURE — 1170F FXNL STATUS ASSESSED: CPT | Performed by: NURSE PRACTITIONER

## 2023-06-19 PROCEDURE — G0439 PPPS, SUBSEQ VISIT: HCPCS | Performed by: NURSE PRACTITIONER

## 2023-06-19 NOTE — PROGRESS NOTES
The ABCs of the Annual Wellness Visit  Subsequent Medicare Wellness Visit    Subjective    Irlanda Henry is a 72 y.o. female who presents for a Subsequent Medicare Wellness Visit.    The following portions of the patient's history were reviewed and   updated as appropriate: allergies, current medications, past family history, past medical history, past social history, past surgical history, and problem list.    Wt Readings from Last 4 Encounters:   06/19/23 78 kg (172 lb)   06/13/23 77.1 kg (170 lb)   03/07/23 78.1 kg (172 lb 3.2 oz)   05/05/22 77.1 kg (170 lb)       Weight trend is stable.    Mobility    [] Ambulates independently  [] Ambulates with cane   [] Ambulates with quad cane  [] Ambulates with rollator   [] Ambulates with walker   [] Mobile with wheelchair   [x] Mobile with electric wheelchair (in the last year)    Uses brace on right leg with walker or crutches.  Also has WC as needed.     She has a potty chair she can use bedside at night if needed.      Washer and dryer in basement.  States if children can't come to help with laundry, she crawls/scoots down steps and back up.   Has no plan to change house to get on same level.     6-7 steps outside of house.     States really doesn't go anywhere.  Orders groceries and granddaughter will pick it up.     Compared to one year ago, the patient feels her physical   health is worse.      Recent Hospitalizations:  She was not admitted to the hospital during the last year.       Current Medical Providers:  Patient Care Team:  Carlitos Shah III, NP-C as PCP - General (Family Medicine)  Patricio Ulloa MD as Consulting Physician (Gastroenterology)    No outpatient medications prior to visit.     No facility-administered medications prior to visit.       No opioid medication identified on active medication list. I have reviewed chart for other potential  high risk medication/s and harmful drug interactions in the elderly.        Aspirin is  "not on active medication list.  Aspirin use is not indicated based on review of current medical condition/s. Risk of harm outweighs potential benefits.  .    Patient Active Problem List   Diagnosis    Chest pain    Post-polio limb muscle weakness    Gastroesophageal reflux disease with esophagitis    Allergic arthritis of right hip    Gout of hand    Chronic fatigue    Avitaminosis D    Nodular goiter, non-toxic    Thyroiditis    Polio    Lower abdominal pain     Advance Care Planning  Advance Directive is not on file.  ACP discussion was held with the patient during this visit. States it has been made but hasn't been signed yet.      Objective    Vitals:    23 1509   BP: 120/82   BP Location: Left arm   Patient Position: Sitting   Cuff Size: Adult   Weight: 78 kg (172 lb)   Height: 165.1 cm (65\")     Estimated body mass index is 28.62 kg/m² as calculated from the following:    Height as of this encounter: 165.1 cm (65\").    Weight as of this encounter: 78 kg (172 lb).    Does the patient have evidence of cognitive impairment? No          HEALTH RISK ASSESSMENT    Smoking Status:  Social History     Tobacco Use   Smoking Status Never   Smokeless Tobacco Never     Alcohol Consumption:  Social History     Substance and Sexual Activity   Alcohol Use Yes    Comment: rare     Fall Risk Screen:    STEADI Fall Risk Assessment was completed, and patient is at LOW risk for falls.Assessment completed on:2023    Depression Screenin/19/2023     3:20 PM   PHQ-2/PHQ-9 Depression Screening   Little Interest or Pleasure in Doing Things 2-->more than half the days   Feeling Down, Depressed or Hopeless 1-->several days   Trouble Falling or Staying Asleep, or Sleeping Too Much 0-->not at all   Feeling Tired or Having Little Energy 0-->not at all   Poor Appetite or Overeating 0-->not at all   Feeling Bad about Yourself - or that You are a Failure or Have Let Yourself or Your Family Down 0-->not at all   Trouble " Concentrating on Things, Such as Reading the Newspaper or Watching Television 0-->not at all   Moving or Speaking So Slowly that Other People Could Have Noticed? Or the Opposite - Being So Fidgety 0-->not at all   Thoughts that You Would be Better Off Dead or of Hurting Yourself in Some Way 0-->not at all   PHQ-9: Brief Depression Severity Measure Score 3       Health Habits and Functional and Cognitive Screenin/19/2023     3:21 PM   Functional & Cognitive Status   Do you have difficulty preparing food and eating? No   Do you have difficulty bathing yourself, getting dressed or grooming yourself? No   Do you have difficulty using the toilet? No   Do you have difficulty moving around from place to place? No   Do you have trouble with steps or getting out of a bed or a chair? No   Current Diet Well Balanced Diet   Dental Exam Up to date   Eye Exam Up to date   Exercise (times per week) 0 times per week   Current Exercises Include No Regular Exercise   Do you need help using the phone?  No   Are you deaf or do you have serious difficulty hearing?  Yes   Do you need help with transportation? Yes   Do you need help shopping? Yes   Do you need help preparing meals?  Yes   Do you need help with housework?  Yes   Do you need help with laundry? Yes   Do you need help taking your medications? Yes   Do you need help managing money? Yes   Do you ever drive or ride in a car without wearing a seat belt? No   Have you felt unusual stress, anger or loneliness in the last month? No   Who do you live with? Alone   If you need help, do you have trouble finding someone available to you? Yes   Have you been bothered in the last four weeks by sexual problems? No       Age-appropriate Screening Schedule:  Refer to the list below for future screening recommendations based on patient's age, sex and/or medical conditions. Orders for these recommended tests are listed in the plan section. The patient has been provided with a written  plan.    Health Maintenance   Topic Date Due    DXA SCAN  Never done    Pneumococcal Vaccine 65+ (1 - PCV) Never done    HEPATITIS C SCREENING  Never done    ANNUAL WELLNESS VISIT  Never done    COVID-19 Vaccine (4 - Pfizer series) 02/22/2022    INFLUENZA VACCINE  10/01/2023    MAMMOGRAM  08/15/2024    TDAP/TD VACCINES  Discontinued    ZOSTER VACCINE  Discontinued    COLORECTAL CANCER SCREENING  Discontinued                CMS Preventative Services Quick Reference  Risk Factors Identified During Encounter  Discussed pneumonia vaccine: she has hx of reactions to different medications.  As she is about to have colonoscopy: we defer until a later time.       The above risks/problems have been discussed with the patient.  Pertinent information has been shared with the patient in the After Visit Summary.  An After Visit Summary and PPPS were made available to the patient.    Follow Up:   Next Medicare Wellness visit to be scheduled in 1 year.       Additional E&M Note during same encounter follows:  Patient has multiple medical problems which are significant and separately identifiable that require additional work above and beyond the Medicare Wellness Visit.      Chief Complaint  Medicare Wellness-subsequent    Subjective        Irlanda Henry is also being seen today for AWV and follow up chronic conditions: post-polio syndrome.       She was recently seen by GI: per note she was advised daily stool softener.    Plan for colonoscopy this Thursday with admission the day before due to assistance she needs during the prep.     Plan also for abd/pelvis CT.     Most of visit time today was discussion about calls and scheduling with GI department and not having information about time to arrive to the hospital on Wednesday. Discussed that in my experience for a situation like hers, bedboard will contact her when a bed is available that day.    She did speak to GI office on the way here today and awaiting further  "information.     Since last visit: she was seen by hand surgery clinic on 3/22/23      Objective   Vital Signs:  /82 (BP Location: Left arm, Patient Position: Sitting, Cuff Size: Adult)   Ht 165.1 cm (65\")   Wt 78 kg (172 lb)   BMI 28.62 kg/m²          Assessment and Plan     Problem List Items Addressed This Visit          Musculoskeletal and Injuries    Post-polio limb muscle weakness (Chronic)    Overview     Right lower extremity weakness: wears brace           Other Visit Diagnoses       Medicare annual wellness visit, subsequent    -  Primary    Constipation, unspecified constipation type               Plans for colonoscopy and CT this week.   Can take PRN miralax.            Follow Up     No follow-ups on file.    Patient was given instructions and counseling regarding her condition or for health maintenance advice. Please see specific information pulled into the AVS if appropriate.         "

## 2023-06-19 NOTE — TELEPHONE ENCOUNTER
Call to patient per bubble from Eddie. Patient wanted to know if she needed to bring her own prep and what building she is suppose to show up to. She also asked if a CT could be done while she was in the hospital.     Informed patient when nurse calls to get her admitted she would get a call regarding all that information.

## 2023-06-19 NOTE — PATIENT INSTRUCTIONS
Medicare Wellness  Personal Prevention Plan of Service     Date of Office Visit:    Encounter Provider:  Carlitos Shah III, NP-C  Place of Service:  [facility]  Patient Name: Irlanda Henry  :  1950    As part of the Medicare Wellness portion of your visit today, we are providing you with this personalized preventive plan of services (PPPS). This plan is based upon recommendations of the [place] Preventive Services Task Force (USPSTF) and the Advisory Committee on Immunization Practices (ACIP).    This lists the preventive care services that should be considered, and provides dates of when you are due. Items listed as completed are up-to-date and do not require any further intervention.    Health Maintenance   Topic Date Due    DXA SCAN  Never done    HEPATITIS C SCREENING  Never done    COVID-19 Vaccine (4 - Pfizer series) 2022    Pneumococcal Vaccine 65+ (1 - PCV) 2024 (Originally 2016)    INFLUENZA VACCINE  10/01/2023    ANNUAL WELLNESS VISIT  2024    MAMMOGRAM  08/15/2024    TDAP/TD VACCINES  Discontinued    ZOSTER VACCINE  Discontinued    COLORECTAL CANCER SCREENING  Discontinued       No orders of the defined types were placed in this encounter.      No follow-ups on file.

## 2023-06-20 PROBLEM — Z74.09 IMMOBILITY: Status: ACTIVE | Noted: 2023-06-20

## 2023-06-21 PROBLEM — L29.2 VULVAR ITCHING: Status: ACTIVE | Noted: 2023-06-21

## 2023-06-21 PROBLEM — R19.4 ENCOUNTER FOR DIAGNOSTIC COLONOSCOPY DUE TO CHANGE IN BOWEL HABITS: Status: ACTIVE | Noted: 2023-06-21

## 2023-06-22 PROBLEM — R55 NEAR SYNCOPE: Status: ACTIVE | Noted: 2023-06-22

## 2023-06-22 PROBLEM — R93.5 ABNORMAL CT OF THE ABDOMEN: Status: ACTIVE | Noted: 2023-06-22

## 2023-10-02 ENCOUNTER — TRANSCRIBE ORDERS (OUTPATIENT)
Dept: ADMINISTRATIVE | Facility: HOSPITAL | Age: 73
End: 2023-10-02
Payer: MEDICARE

## 2023-10-02 DIAGNOSIS — Z12.31 VISIT FOR SCREENING MAMMOGRAM: Primary | ICD-10-CM

## 2023-10-10 ENCOUNTER — TRANSCRIBE ORDERS (OUTPATIENT)
Dept: ADMINISTRATIVE | Facility: HOSPITAL | Age: 73
End: 2023-10-10
Payer: MEDICARE

## 2023-10-10 DIAGNOSIS — N94.89 ADNEXAL MASS: Primary | ICD-10-CM

## 2023-10-17 ENCOUNTER — HOSPITAL ENCOUNTER (OUTPATIENT)
Dept: MAMMOGRAPHY | Facility: HOSPITAL | Age: 73
Discharge: HOME | End: 2023-10-17
Admitting: NURSE PRACTITIONER
Payer: MEDICARE

## 2023-10-17 DIAGNOSIS — Z12.31 VISIT FOR SCREENING MAMMOGRAM: ICD-10-CM

## 2023-10-17 PROCEDURE — 77063 BREAST TOMOSYNTHESIS BI: CPT

## 2023-10-17 PROCEDURE — 77067 SCR MAMMO BI INCL CAD: CPT

## 2023-10-18 ENCOUNTER — TRANSCRIBE ORDERS (OUTPATIENT)
Dept: ADMINISTRATIVE | Facility: HOSPITAL | Age: 73
End: 2023-10-18
Payer: MEDICARE

## 2023-10-18 DIAGNOSIS — R91.1 PULMONARY NODULE: Primary | ICD-10-CM

## 2023-10-26 ENCOUNTER — HOSPITAL ENCOUNTER (OUTPATIENT)
Facility: HOSPITAL | Age: 73
Discharge: HOME | End: 2023-10-26
Admitting: STUDENT IN AN ORGANIZED HEALTH CARE EDUCATION/TRAINING PROGRAM
Payer: MEDICARE

## 2023-10-26 DIAGNOSIS — R91.1 PULMONARY NODULE: ICD-10-CM

## 2023-10-26 DIAGNOSIS — N94.89 ADNEXAL MASS: ICD-10-CM

## 2023-10-26 PROCEDURE — 25510000001 IOPAMIDOL 61 % SOLUTION: Performed by: STUDENT IN AN ORGANIZED HEALTH CARE EDUCATION/TRAINING PROGRAM

## 2023-10-26 PROCEDURE — 74177 CT ABD & PELVIS W/CONTRAST: CPT

## 2023-10-26 PROCEDURE — 71260 CT THORAX DX C+: CPT

## 2023-10-26 PROCEDURE — 0 DIATRIZOATE MEGLUMINE & SODIUM PER 1 ML: Performed by: STUDENT IN AN ORGANIZED HEALTH CARE EDUCATION/TRAINING PROGRAM

## 2023-10-26 RX ADMIN — DIATRIZOATE MEGLUMINE AND DIATRIZOATE SODIUM 30 ML: 600; 100 SOLUTION ORAL; RECTAL at 12:27

## 2023-10-26 RX ADMIN — IOPAMIDOL 100 ML: 612 INJECTION, SOLUTION INTRAVENOUS at 14:00

## 2023-10-27 ENCOUNTER — TELEPHONE (OUTPATIENT)
Dept: INTERNAL MEDICINE | Facility: CLINIC | Age: 73
End: 2023-10-27

## 2023-10-27 NOTE — TELEPHONE ENCOUNTER
Caller: Irlanda Henry    Relationship: Self    Best call back number: [phone]    What medication are you requesting: PAXLOVID    What are your current symptoms: POSITIVE HOME COVID TEST    How long have you been experiencing symptoms: 10/26/23    Have you had these symptoms before:    [x] Yes  [] No    Have you been treated for these symptoms before:   [] Yes  [x] No    If a prescription is needed, what is your preferred pharmacy and phone number: [facility] [id number] - [place], [place] - [facility] AT [facility] [address] ([facility])  [phone] [facility] [phone] FX     Additional notes: PATIENT STATED THAT THEY TESTED POSITIVE FOR COVID AND WOULD LIKE TO HAVE THE MEDICATION CALLED IN TO HELP WITH THE SYMPTOMS. PLEASE CALL AND ADVISE IF FURTHER ACTION NEEDED      
Pt needs to make an video visit to discuss medication for her positive Covid. [facility].     HUB OKAY TO READ   
normal...

## 2023-12-10 ENCOUNTER — APPOINTMENT (OUTPATIENT)
Dept: CT IMAGING | Facility: HOSPITAL | Age: 73
End: 2023-12-10
Payer: MEDICARE

## 2023-12-10 ENCOUNTER — HOSPITAL ENCOUNTER (EMERGENCY)
Facility: HOSPITAL | Age: 73
Discharge: HOME | End: 2023-12-10
Attending: EMERGENCY MEDICINE | Admitting: EMERGENCY MEDICINE
Payer: MEDICARE

## 2023-12-10 VITALS
BODY MASS INDEX: 28.34 KG/M2 | RESPIRATION RATE: 17 BRPM | SYSTOLIC BLOOD PRESSURE: 145 MMHG | WEIGHT: 166 LBS | DIASTOLIC BLOOD PRESSURE: 91 MMHG | HEART RATE: 69 BPM | TEMPERATURE: 97.2 F | HEIGHT: 64 IN | OXYGEN SATURATION: 99 %

## 2023-12-10 DIAGNOSIS — R53.1 WEAKNESS: Primary | ICD-10-CM

## 2023-12-10 DIAGNOSIS — R03.0 BORDERLINE HIGH BLOOD PRESSURE: ICD-10-CM

## 2023-12-10 DIAGNOSIS — H53.8 BLURRED VISION: ICD-10-CM

## 2023-12-10 LAB
ALBUMIN SERPL-MCNC: 3.9 G/DL (ref 3.5–5.2)
ALBUMIN/GLOB SERPL: 1.6 G/DL
ALP SERPL-CCNC: 46 U/L (ref 39–117)
ALT SERPL W P-5'-P-CCNC: 13 U/L (ref 1–33)
ANION GAP SERPL CALCULATED.3IONS-SCNC: 10.6 MMOL/L (ref 5–15)
AST SERPL-CCNC: 16 U/L (ref 1–32)
BASOPHILS # BLD AUTO: 0.06 10*3/MM3 (ref 0–0.2)
BASOPHILS NFR BLD AUTO: 1.3 % (ref 0–1.5)
BILIRUB SERPL-MCNC: 0.3 MG/DL (ref 0–1.2)
BILIRUB UR QL STRIP: NEGATIVE
BUN SERPL-MCNC: 14 MG/DL (ref 8–23)
BUN/CREAT SERPL: 22.2 (ref 7–25)
CALCIUM SPEC-SCNC: 9.4 MG/DL (ref 8.6–10.5)
CHLORIDE SERPL-SCNC: 107 MMOL/L (ref 98–107)
CLARITY UR: CLEAR
CO2 SERPL-SCNC: 22.4 MMOL/L (ref 22–29)
COLOR UR: YELLOW
CREAT SERPL-MCNC: 0.63 MG/DL (ref 0.57–1)
DEPRECATED RDW RBC AUTO: 40.6 FL (ref 37–54)
EGFRCR SERPLBLD CKD-EPI 2021: 94.4 ML/MIN/1.73
EOSINOPHIL # BLD AUTO: 0.03 10*3/MM3 (ref 0–0.4)
EOSINOPHIL NFR BLD AUTO: 0.6 % (ref 0.3–6.2)
ERYTHROCYTE [DISTWIDTH] IN BLOOD BY AUTOMATED COUNT: 12.5 % (ref 12.3–15.4)
GLOBULIN UR ELPH-MCNC: 2.4 GM/DL
GLUCOSE SERPL-MCNC: 104 MG/DL (ref 65–99)
GLUCOSE UR STRIP-MCNC: NEGATIVE MG/DL
HCT VFR BLD AUTO: 42.8 % (ref 34–46.6)
HGB BLD-MCNC: 13.7 G/DL (ref 12–15.9)
HGB UR QL STRIP.AUTO: NEGATIVE
IMM GRANULOCYTES # BLD AUTO: 0.03 10*3/MM3 (ref 0–0.05)
IMM GRANULOCYTES NFR BLD AUTO: 0.6 % (ref 0–0.5)
KETONES UR QL STRIP: NEGATIVE
LEUKOCYTE ESTERASE UR QL STRIP.AUTO: NEGATIVE
LYMPHOCYTES # BLD AUTO: 0.5 10*3/MM3 (ref 0.7–3.1)
LYMPHOCYTES NFR BLD AUTO: 10.5 % (ref 19.6–45.3)
MCH RBC QN AUTO: 28.3 PG (ref 26.6–33)
MCHC RBC AUTO-ENTMCNC: 32 G/DL (ref 31.5–35.7)
MCV RBC AUTO: 88.4 FL (ref 79–97)
MONOCYTES # BLD AUTO: 0.34 10*3/MM3 (ref 0.1–0.9)
MONOCYTES NFR BLD AUTO: 7.2 % (ref 5–12)
NEUTROPHILS NFR BLD AUTO: 3.78 10*3/MM3 (ref 1.7–7)
NEUTROPHILS NFR BLD AUTO: 79.8 % (ref 42.7–76)
NITRITE UR QL STRIP: NEGATIVE
NRBC BLD AUTO-RTO: 0 /100 WBC (ref 0–0.2)
PH UR STRIP.AUTO: 6.5 [PH] (ref 5–8)
PLATELET # BLD AUTO: 242 10*3/MM3 (ref 140–450)
PMV BLD AUTO: 9.4 FL (ref 6–12)
POTASSIUM SERPL-SCNC: 4.6 MMOL/L (ref 3.5–5.2)
PROT SERPL-MCNC: 6.3 G/DL (ref 6–8.5)
PROT UR QL STRIP: NEGATIVE
RBC # BLD AUTO: 4.84 10*6/MM3 (ref 3.77–5.28)
SODIUM SERPL-SCNC: 140 MMOL/L (ref 136–145)
SP GR UR STRIP: 1.01 (ref 1–1.03)
T4 FREE SERPL-MCNC: 1.27 NG/DL (ref 0.93–1.7)
TSH SERPL DL<=0.05 MIU/L-ACNC: 2.59 UIU/ML (ref 0.27–4.2)
UROBILINOGEN UR QL STRIP: NORMAL
WBC NRBC COR # BLD AUTO: 4.74 10*3/MM3 (ref 3.4–10.8)

## 2023-12-10 PROCEDURE — 99284 EMERGENCY DEPT VISIT MOD MDM: CPT

## 2023-12-10 PROCEDURE — 85025 COMPLETE CBC W/AUTO DIFF WBC: CPT | Performed by: EMERGENCY MEDICINE

## 2023-12-10 PROCEDURE — 81003 URINALYSIS AUTO W/O SCOPE: CPT | Performed by: EMERGENCY MEDICINE

## 2023-12-10 PROCEDURE — 84443 ASSAY THYROID STIM HORMONE: CPT | Performed by: EMERGENCY MEDICINE

## 2023-12-10 PROCEDURE — 84439 ASSAY OF FREE THYROXINE: CPT | Performed by: EMERGENCY MEDICINE

## 2023-12-10 PROCEDURE — 70450 CT HEAD/BRAIN W/O DYE: CPT

## 2023-12-10 PROCEDURE — 80053 COMPREHEN METABOLIC PANEL: CPT | Performed by: EMERGENCY MEDICINE

## 2023-12-10 RX ORDER — SODIUM CHLORIDE 0.9 % (FLUSH) 0.9 %
10 SYRINGE (ML) INJECTION AS NEEDED
Status: DISCONTINUED | OUTPATIENT
Start: 2023-12-10 | End: 2023-12-10 | Stop reason: HOSPADM

## 2023-12-10 NOTE — ED PROVIDER NOTES
" EMERGENCY DEPARTMENT ENCOUNTER    Room Number:  37/37  Date seen:  12/10/2023  PCP: Carlitos Shah III, NP-C  Historian: Patient      HPI:  Chief Complaint: Weakness  Context: Irlanda Henry is a 73 y.o. female who presents to the ED c/o not feeling well for 6 weeks.  The patient states approximately 6 weeks ago she had a CT of her abdomen pelvis that required IV and oral contrast.  She states that afterwards she had 24 hours of vomiting and diarrhea and was seen at the urgent care center.  She states that since then she has just felt weak and not right.  She states that she has had difficulty getting in to who her PCP.  The patient states yesterday she had blurred vision in her right eye and today she had blurred vision in both eyes and thus came to the emergency room.  Currently she denies headache, blurred vision or focal neurodeficit.  She states she just feels bad and wants a \"full checkup\".      PAST MEDICAL HISTORY  Active Ambulatory Problems     Diagnosis Date Noted    Chest pain 08/31/2017    Post-polio limb muscle weakness 09/01/2017    Gastroesophageal reflux disease with esophagitis 09/25/2017    Allergic arthritis of right hip 04/11/2019    Gout of hand 04/11/2019    Chronic fatigue 04/11/2019    Avitaminosis D 04/29/2021    Nodular goiter, non-toxic 04/29/2021    Thyroiditis 04/29/2021    Polio     Lower abdominal pain 06/16/2023    Immobility 06/20/2023    Encounter for diagnostic colonoscopy due to change in bowel habits 06/21/2023    Vulvar itching 06/21/2023    Abnormal CT of the abdomen 06/22/2023    Near syncope 06/22/2023     Resolved Ambulatory Problems     Diagnosis Date Noted    Costochondritis, acute 09/01/2017     Past Medical History:   Diagnosis Date    Cataract     Vasovagal syncope          REVIEW OF SYSTEMS  All systems reviewed and negative except for those discussed in HPI.       PAST SURGICAL HISTORY  Past Surgical History:   Procedure Laterality Date    CARDIAC " CATHETERIZATION N/A 09/02/2017    Procedure: Left Heart Cath;  Surgeon: Jose Murphy MD;  Location:  [facility];  Service:     CARDIAC CATHETERIZATION N/A 09/02/2017    Procedure: Coronary angiography;  Surgeon: Jose Murphy MD;  Location:  [facility];  Service:     CARPAL TUNNEL RELEASE      CATARACT EXTRACTION      CHOLECYSTECTOMY      COLONOSCOPY      COLONOSCOPY N/A 6/22/2023    Procedure: COLONOSCOPY to cecum;  Surgeon: Patricio Ulloa MD;  Location:  [facility];  Service: Gastroenterology;  Laterality: N/A;  PRE - abd pain  POST - diverticulosis    LEG SURGERY      multiple surgeries for polio    RETINAL DETACHMENT REPAIR Left          FAMILY HISTORY  Family History   Problem Relation Age of Onset    Breast cancer Maternal Aunt     Heart attack Mother         in 90s     Kidney failure Father          SOCIAL HISTORY  Social History     Socioeconomic History    Marital status: Legally    Tobacco Use    Smoking status: Never    Smokeless tobacco: Never   Vaping Use    Vaping Use: Never used   Substance and Sexual Activity    Alcohol use: Yes     Comment: rare    Drug use: No    Sexual activity: Not Currently     Birth control/protection: None         ALLERGIES  Patient has no known allergies.      PHYSICAL EXAM  ED Triage Vitals   Temp Heart Rate Resp BP SpO2   12/10/23 1541 12/10/23 1541 12/10/23 1541 12/10/23 1553 12/10/23 1541   97.2 °F (36.2 °C) 94 18 (!) 181/100 97 %      Temp src Heart Rate Source Patient Position BP Location FiO2 (%)   -- -- -- 12/10/23 1553 --      Right arm        Physical Exam      GENERAL: 73-year-old female patient in no acute distress  HENT: NCAT: nares patent: Neck supple  EYES: no scleral icterus  CV: regular rhythm, normal rate  RESPIRATORY: normal effort  ABDOMEN: soft, NTND: Bowel sounds positive  MUSCULOSKELETAL: no deformity  NEURO: alert with nonfocal neuro exam  PSYCH:  calm, cooperative but appears  anxious  SKIN: warm, dry    Vital signs and nursing notes reviewed.      LAB RESULTS  Recent Results (from the past 24 hour(s))   Comprehensive Metabolic Panel    Collection Time: 12/10/23  4:41 PM    Specimen: Blood   Result Value Ref Range    Glucose 104 (H) 65 - 99 mg/dL    BUN 14 8 - 23 mg/dL    Creatinine 0.63 0.57 - 1.00 mg/dL    Sodium 140 136 - 145 mmol/L    Potassium 4.6 3.5 - 5.2 mmol/L    Chloride 107 98 - 107 mmol/L    CO2 22.4 22.0 - 29.0 mmol/L    Calcium 9.4 8.6 - 10.5 mg/dL    Total Protein 6.3 6.0 - 8.5 g/dL    Albumin 3.9 3.5 - 5.2 g/dL    ALT (SGPT) 13 1 - 33 U/L    AST (SGOT) 16 1 - 32 U/L    Alkaline Phosphatase 46 39 - 117 U/L    Total Bilirubin 0.3 0.0 - 1.2 mg/dL    Globulin 2.4 gm/dL    A/G Ratio 1.6 g/dL    BUN/Creatinine Ratio 22.2 7.0 - 25.0    Anion Gap 10.6 5.0 - 15.0 mmol/L    eGFR 94.4 >60.0 mL/min/1.73   TSH    Collection Time: 12/10/23  4:41 PM    Specimen: Blood   Result Value Ref Range    TSH 2.590 0.270 - 4.200 uIU/mL   T4, Free    Collection Time: 12/10/23  4:41 PM    Specimen: Blood   Result Value Ref Range    Free T4 1.27 0.93 - 1.70 ng/dL   CBC Auto Differential    Collection Time: 12/10/23  4:41 PM    Specimen: Blood   Result Value Ref Range    WBC 4.74 3.40 - 10.80 10*3/mm3    RBC 4.84 3.77 - 5.28 10*6/mm3    Hemoglobin 13.7 12.0 - 15.9 g/dL    Hematocrit 42.8 34.0 - 46.6 %    MCV 88.4 79.0 - 97.0 fL    MCH 28.3 26.6 - 33.0 pg    MCHC 32.0 31.5 - 35.7 g/dL    RDW 12.5 12.3 - 15.4 %    RDW-SD 40.6 37.0 - 54.0 fl    MPV 9.4 6.0 - 12.0 fL    Platelets 242 140 - 450 10*3/mm3    Neutrophil % 79.8 (H) 42.7 - 76.0 %    Lymphocyte % 10.5 (L) 19.6 - 45.3 %    Monocyte % 7.2 5.0 - 12.0 %    Eosinophil % 0.6 0.3 - 6.2 %    Basophil % 1.3 0.0 - 1.5 %    Immature Grans % 0.6 (H) 0.0 - 0.5 %    Neutrophils, Absolute 3.78 1.70 - 7.00 10*3/mm3    Lymphocytes, Absolute 0.50 (L) 0.70 - 3.10 10*3/mm3    Monocytes, Absolute 0.34 0.10 - 0.90 10*3/mm3    Eosinophils, Absolute 0.03 0.00 - 0.40  10*3/mm3    Basophils, Absolute 0.06 0.00 - 0.20 10*3/mm3    Immature Grans, Absolute 0.03 0.00 - 0.05 10*3/mm3    nRBC 0.0 0.0 - 0.2 /100 WBC   Urinalysis With Microscopic If Indicated (No Culture) - Urine, Clean Catch    Collection Time: 12/10/23  5:03 PM    Specimen: Urine, Clean Catch   Result Value Ref Range    Color, UA Yellow Yellow, Straw    Appearance, UA Clear Clear    pH, UA 6.5 5.0 - 8.0    Specific Gravity, UA 1.008 1.005 - 1.030    Glucose, UA Negative Negative    Ketones, UA Negative Negative    Bilirubin, UA Negative Negative    Blood, UA Negative Negative    Protein, UA Negative Negative    Leuk Esterase, UA Negative Negative    Nitrite, UA Negative Negative    Urobilinogen, UA 0.2 E.U./dL 0.2 - 1.0 E.U./dL       Ordered the above labs and reviewed the results.        RADIOLOGY  CT Head Without Contrast    Result Date: 12/10/2023  CT HEAD WITHOUT CONTRAST  HISTORY: Intermittent blurred, double vision for 2 days.  TECHNIQUE:  Head CT includes axial imaging from the base of skull to the vertex without intravenous contrast. Radiation dose reduction techniques were utilized, including automated exposure control and exposure modulation based on body size.  COMPARISON: None  FINDINGS: There are no abnormal areas of increased attenuation intra-axially to suggest hemorrhage. No extra-axial fluid collection is observed. There is no evidence for cerebral edema or mass effect or shift of the midline structures. Mild left sphenoid and ethmoid sinus mucosal thickening is present. There is moderate right maxillary sinus mucosal thickening and there are bubbles within the right maxillary sinus and this may represent acute sinus disease.      1. No evidence for acute intracranial abnormality. 2. Inflammatory paranasal sinus disease with bubbles in the right maxillary sinus and this may represent acute sinus disease/sinusitis.       Ordered the above noted radiological studies. Reviewed by me in PACS.             PROCEDURES  Procedures          MEDICATIONS GIVEN IN ER  Medications - No data to display          MEDICAL DECISION MAKING, PROGRESS, and CONSULTS    All labs have been independently reviewed by me.  All radiology studies have been reviewed by me and I have also reviewed the radiology report.   EKG's independently viewed and interpreted by me.  Discussion below represents my analysis of pertinent findings related to patient's condition, differential diagnosis, treatment plan and final disposition.        Orders placed during this visit:  Orders Placed This Encounter   Procedures    CT Head Without Contrast    Comprehensive Metabolic Panel    Urinalysis With Microscopic If Indicated (No Culture) - Urine, Clean Catch    TSH    T4, Free    CBC Auto Differential    Monitor Blood Pressure    Pulse Oximetry, Continuous    Orthostatic Vitals    CBC & Differential         Independent interpretation of labs, radiology studies, and discussions with consultants:  ED Course as of 12/10/23 2329   Sun Dec 10, 2023   1630 I will check the patient's labs, urinalysis and head CT for further evaluation.  The patient's blood pressure is mildly elevated.  She states she does not take her blood pressure medications. [GP]   1858 The patient's head CT scan was read as negative acute.  Her lab work and urinalysis is unremarkable.  Her blood pressures been borderline elevated. [GP]   1912 On repeat examination advised the patient that her workup was unremarkable and that she has had borderline high blood pressure.  Currently the patient does not want to begin medication but would like to follow-up with PCP in regards to this. [GP]      ED Course User Index  [GP] Trenton Salinas MD               DIAGNOSIS  Final diagnoses:   Weakness   Blurred vision   Borderline high blood pressure         DISPOSITION  Discharged            Latest Documented Vital Signs:  As of 23:29 EST  BP- 145/91 HR- 69 Temp- 97.2 °F (36.2 °C) O2 sat-  99%--      --------------------  Please note that portions of this were completed with a voice recognition program.       Note Disclaimer: At [facility], we believe that sharing information builds trust and better relationships. You are receiving this note because you are receiving care at [facility] or recently visited. It is possible you will see health information before a provider has talked with you about it. This kind of information can be easy to misunderstand. To help you fully understand what it means for your health, we urge you to discuss this note with your provider.             Trenton Salinas MD  12/10/23 6204

## 2023-12-10 NOTE — ED NOTES
"Pt to ER via [facility] from home for \"kidney issues\". Pt states she had diet tests done with contrast and she hasn't felt like same since. Pt also said she has had blurry vision x yesterday. Pt has several other complaints, saying \"I am unable to get into my PCP, so I need a full check while I am here\".  "

## 2023-12-10 NOTE — ED NOTES
"Pt states she is here today to check her \"kidney functions\". Pt states she has felt \"off\" ever since getting contrast dye. Pt states her urinary patters are \"off\". \"Sometimes I will go a lot and sometimes not at all\".      Pt also states intermittent blurred, double vision x 2 days.   "

## 2023-12-14 ENCOUNTER — OFFICE VISIT (OUTPATIENT)
Dept: INTERNAL MEDICINE | Facility: CLINIC | Age: 73
End: 2023-12-14
Payer: MEDICARE

## 2023-12-14 VITALS
BODY MASS INDEX: 28.95 KG/M2 | HEIGHT: 64 IN | SYSTOLIC BLOOD PRESSURE: 147 MMHG | TEMPERATURE: 98.1 F | HEART RATE: 77 BPM | WEIGHT: 169.6 LBS | OXYGEN SATURATION: 97 % | DIASTOLIC BLOOD PRESSURE: 83 MMHG

## 2023-12-14 DIAGNOSIS — R09.82 POST-NASAL DRIP: ICD-10-CM

## 2023-12-14 DIAGNOSIS — I10 HYPERTENSION, UNSPECIFIED TYPE: Primary | ICD-10-CM

## 2023-12-14 RX ORDER — FLUTICASONE PROPIONATE 50 MCG
2 SPRAY, SUSPENSION (ML) NASAL DAILY
Qty: 9.9 ML | Refills: 0 | Status: SHIPPED | OUTPATIENT
Start: 2023-12-14

## 2023-12-14 RX ORDER — OLMESARTAN MEDOXOMIL 20 MG/1
20 TABLET ORAL DAILY
Qty: 30 TABLET | Refills: 3 | Status: SHIPPED | OUTPATIENT
Start: 2023-12-14

## 2023-12-14 NOTE — PROGRESS NOTES
"Chief Complaint  Hospital Follow Up Visit, hypertension     Subjective        Irlanda Henry presents to [facility]  History of Present Illness  Feeling bad for a few months.   Complains of not being able to clear throat at times, denies choking with food or liquid intake.       Hypertension  This is a new problem. The current episode started 1 to 4 weeks ago. The problem is unchanged. The problem is uncontrolled. Associated symptoms include anxiety, blurred vision, headaches and malaise/fatigue. Pertinent negatives include no chest pain, peripheral edema or shortness of breath. Risk factors for coronary artery disease include stress. Past treatments include nothing. Compliance problems include medication side effects (Complains of many side effects from medications, hesitancy to start medications).    Choking  This is a new problem. The current episode started 1 to 4 weeks ago. The problem occurs intermittently. The problem has been unchanged. Associated symptoms include congestion and headaches. Pertinent negatives include no anorexia, chest pain, coughing, fatigue, sore throat or vomiting. She has tried nothing for the symptoms. The treatment provided no relief.       Objective   Vital Signs:  /83 (BP Location: Left arm, Patient Position: Sitting, Cuff Size: Adult)   Pulse 77   Temp 98.1 °F (36.7 °C) (Oral)   Ht 162.6 cm (64\")   Wt 76.9 kg (169 lb 9.6 oz)   SpO2 97%   BMI 29.11 kg/m²   Estimated body mass index is 29.11 kg/m² as calculated from the following:    Height as of this encounter: 162.6 cm (64\").    Weight as of this encounter: 76.9 kg (169 lb 9.6 oz).             Physical Exam  Vitals reviewed.   Constitutional:       Appearance: Normal appearance.   HENT:      Head: Normocephalic.      Right Ear: Drainage present.      Left Ear: Drainage present.      Nose:      Right Nostril: No foreign body.      Left Nostril: No foreign body.      Right Turbinates: " Swollen.      Left Turbinates: Swollen.      Right Sinus: Frontal sinus tenderness present.      Left Sinus: Frontal sinus tenderness present.      Mouth/Throat:      Lips: Pink.      Mouth: Mucous membranes are moist.      Pharynx: No pharyngeal swelling, oropharyngeal exudate or posterior oropharyngeal erythema.      Tonsils: No tonsillar exudate or tonsillar abscesses.   Eyes:      General: Allergic shiner present.   Neck:      Thyroid: No thyroid mass, thyromegaly or thyroid tenderness.      Vascular: No carotid bruit.   Cardiovascular:      Rate and Rhythm: Normal rate and regular rhythm.      Pulses: Normal pulses.           Radial pulses are 2+ on the right side and 2+ on the left side.        Dorsalis pedis pulses are 2+ on the right side and 2+ on the left side.      Heart sounds: Normal heart sounds.   Pulmonary:      Effort: Pulmonary effort is normal.      Breath sounds: Normal breath sounds. No stridor, decreased air movement or transmitted upper airway sounds. No decreased breath sounds.   Abdominal:      General: Abdomen is flat. Bowel sounds are normal.      Palpations: Abdomen is soft.   Musculoskeletal:      Right lower leg: No edema.      Left lower leg: No edema.   Lymphadenopathy:      Head:      Right side of head: No submental, submandibular or tonsillar adenopathy.      Left side of head: Submental adenopathy present. No submandibular or tonsillar adenopathy.      Cervical: No cervical adenopathy.      Right cervical: No superficial, deep or posterior cervical adenopathy.     Left cervical: No superficial, deep or posterior cervical adenopathy.   Skin:     General: Skin is warm and dry.      Capillary Refill: Capillary refill takes less than 2 seconds.   Neurological:      General: No focal deficit present.      Mental Status: She is alert.   Psychiatric:         Mood and Affect: Mood normal.         Behavior: Behavior normal.        Result Review :                   Assessment and Plan    Diagnoses and all orders for this visit:    1. Hypertension, unspecified type (Primary)  -     olmesartan (Benicar) 20 MG tablet; Take 1 tablet by mouth Daily.  Dispense: 30 tablet; Refill: 3    2. Post-nasal drip  -     fluticasone (FLONASE) 50 MCG/ACT nasal spray; 2 sprays into the nostril(s) as directed by provider Daily.  Dispense: 9.9 mL; Refill: 0           I spent 50 minutes caring for Irlanda on this date of service. This time includes time spent by me in the following activities:preparing for the visit, reviewing tests, obtaining and/or reviewing a separately obtained history, performing a medically appropriate examination and/or evaluation , counseling and educating the patient/family/caregiver, ordering medications, tests, or procedures, referring and communicating with other health care professionals , and reviewing side effects of medications. Patient had many questions regarding stay in hospital, medication side effects and ways to reduce blood pressure and anxiety naturally.  Follow Up   Return in about 1 week (around 12/21/2023), or if symptoms worsen or fail to improve, for Recheck.  Patient was given instructions and counseling regarding her condition or for health maintenance advice. Please see specific information pulled into the AVS if appropriate.

## 2023-12-15 ENCOUNTER — TELEPHONE (OUTPATIENT)
Dept: INTERNAL MEDICINE | Facility: CLINIC | Age: 73
End: 2023-12-15
Payer: MEDICARE

## 2023-12-15 PROBLEM — I10 HYPERTENSION: Status: ACTIVE | Noted: 2023-12-15

## 2023-12-15 NOTE — TELEPHONE ENCOUNTER
Patient is taking her blood pressure with a blood pressure machine ,didn't take medication today and kept records of her blood pressure. Bp was normal 120/80.

## 2023-12-15 NOTE — TELEPHONE ENCOUNTER
Left Voice Mail for pt, need more info about message,will send to provider once patient calls back.     HUB OKAY TO READ

## 2023-12-15 NOTE — TELEPHONE ENCOUNTER
Caller: Irlanda Henry    Relationship: Self    Best call back number:     What is the best time to reach you: ANY TIME, TODAY IF POSSIBLE    Who are you requesting to speak with (clinical staff, provider,  specific staff member): JHOANA BEAN    Do you know the name of the person who called: IRLANDA HENRY    What was the call regarding: BLOOD PRESSURE TESTING ISSUES.    Is it okay if the provider responds through MyChart: NO    PLEASE ADVISE.

## 2023-12-21 ENCOUNTER — OFFICE VISIT (OUTPATIENT)
Dept: INTERNAL MEDICINE | Facility: CLINIC | Age: 73
End: 2023-12-21
Payer: MEDICARE

## 2023-12-21 VITALS
OXYGEN SATURATION: 96 % | HEART RATE: 81 BPM | WEIGHT: 168 LBS | TEMPERATURE: 97.7 F | SYSTOLIC BLOOD PRESSURE: 124 MMHG | BODY MASS INDEX: 28.68 KG/M2 | HEIGHT: 64 IN | DIASTOLIC BLOOD PRESSURE: 79 MMHG

## 2023-12-21 DIAGNOSIS — I10 PRIMARY HYPERTENSION: Primary | ICD-10-CM

## 2023-12-21 NOTE — PROGRESS NOTES
"Chief Complaint  Hypertension (F/U)    Subjective        Irlanda Henry presents to [facility]  History of Present Illness  Patient here today for follow up of initiation of Benicar after elevated blood pressures in ER and in office last week. She denies side effects of medication and is tolerating well. She has modified her diet over the last week with reduced sodium intake.   Hypertension  This is a new problem. The current episode started 1 to 4 weeks ago. The problem has been gradually improving since onset. Pertinent negatives include no anxiety, blurred vision or malaise/fatigue. Risk factors for coronary artery disease include stress. Past treatments include nothing. Current antihypertension treatment includes lifestyle changes and angiotensin blockers. The current treatment provides moderate improvement. There are no compliance problems.        Objective   Vital Signs:  /79 (BP Location: Left arm, Patient Position: Sitting, Cuff Size: Adult)   Pulse 81   Temp 97.7 °F (36.5 °C) (Oral)   Ht 162.6 cm (64\")   Wt 76.2 kg (168 lb)   SpO2 96%   BMI 28.84 kg/m²   Estimated body mass index is 28.84 kg/m² as calculated from the following:    Height as of this encounter: 162.6 cm (64\").    Weight as of this encounter: 76.2 kg (168 lb).             Physical Exam  Vitals reviewed.   Constitutional:       Appearance: Normal appearance.   HENT:      Head: Normocephalic.   Cardiovascular:      Rate and Rhythm: Normal rate and regular rhythm.      Pulses: Normal pulses.      Heart sounds: Normal heart sounds.   Pulmonary:      Effort: Pulmonary effort is normal.      Breath sounds: Normal breath sounds.   Abdominal:      General: Abdomen is flat. Bowel sounds are normal.      Palpations: Abdomen is soft.   Skin:     General: Skin is warm and dry.      Capillary Refill: Capillary refill takes less than 2 seconds.   Neurological:      General: No focal deficit present.      Mental " Status: She is alert.   Psychiatric:         Mood and Affect: Mood normal.         Behavior: Behavior normal.        Result Review :                   Assessment and Plan   Diagnoses and all orders for this visit:    1. Primary hypertension (Primary)  Comments:  Follow up from oksana Benicar  Continue with medication  Continue to monitor blood pressure as needed  Remain on low salt diet  Assessment & Plan:  Hypertension is improving with treatment.  Continue current medications.  Blood pressure will be reassessed at the next regular appointment.               Follow Up   Return in about 6 months (around 6/21/2024), or if symptoms worsen or fail to improve, for Recheck hypertension and lab work .  Patient was given instructions and counseling regarding her condition or for health maintenance advice. Please see specific information pulled into the AVS if appropriate.         Answers submitted by the patient for this visit:  Primary Reason for Visit (Submitted on 12/20/2023)  What is the primary reason for your visit?: High Blood Pressure

## 2024-05-10 DIAGNOSIS — I10 HYPERTENSION, UNSPECIFIED TYPE: ICD-10-CM

## 2024-05-13 RX ORDER — OLMESARTAN MEDOXOMIL 20 MG/1
20 TABLET ORAL DAILY
Qty: 30 TABLET | Refills: 3 | Status: SHIPPED | OUTPATIENT
Start: 2024-05-13

## 2024-06-14 ENCOUNTER — TELEPHONE (OUTPATIENT)
Dept: INTERNAL MEDICINE | Facility: CLINIC | Age: 74
End: 2024-06-14
Payer: MEDICARE

## 2024-06-21 ENCOUNTER — OFFICE VISIT (OUTPATIENT)
Dept: INTERNAL MEDICINE | Facility: CLINIC | Age: 74
End: 2024-06-21
Payer: MEDICARE

## 2024-06-21 VITALS
BODY MASS INDEX: 27.99 KG/M2 | DIASTOLIC BLOOD PRESSURE: 60 MMHG | OXYGEN SATURATION: 96 % | HEIGHT: 65 IN | HEART RATE: 69 BPM | SYSTOLIC BLOOD PRESSURE: 118 MMHG | WEIGHT: 168 LBS

## 2024-06-21 DIAGNOSIS — I10 PRIMARY HYPERTENSION: ICD-10-CM

## 2024-06-21 DIAGNOSIS — Z00.00 ANNUAL PHYSICAL EXAM: ICD-10-CM

## 2024-06-21 DIAGNOSIS — E55.9 VITAMIN D DEFICIENCY: ICD-10-CM

## 2024-06-21 DIAGNOSIS — Z83.3 FAMILY HISTORY OF DIABETES MELLITUS: ICD-10-CM

## 2024-06-21 DIAGNOSIS — Z76.89 ENCOUNTER TO ESTABLISH CARE: ICD-10-CM

## 2024-06-21 DIAGNOSIS — Z71.89 ENCOUNTER FOR COUNSELING FOR CARE MANAGEMENT OF PATIENT WITH CHRONIC CONDITIONS AND COMPLEX HEALTH NEEDS USING NURSE-BASED MODEL: ICD-10-CM

## 2024-06-21 DIAGNOSIS — Z87.898 HISTORY OF SOLITARY PULMONARY NODULE: ICD-10-CM

## 2024-06-21 DIAGNOSIS — Z00.00 ENCOUNTER FOR ANNUAL WELLNESS VISIT (AWV) IN MEDICARE PATIENT: Primary | ICD-10-CM

## 2024-06-21 DIAGNOSIS — Z13.820 ENCOUNTER FOR OSTEOPOROSIS SCREENING IN ASYMPTOMATIC POSTMENOPAUSAL PATIENT: ICD-10-CM

## 2024-06-21 DIAGNOSIS — Z78.0 ENCOUNTER FOR OSTEOPOROSIS SCREENING IN ASYMPTOMATIC POSTMENOPAUSAL PATIENT: ICD-10-CM

## 2024-06-21 PROBLEM — Z86.39 HISTORY OF VITAMIN D DEFICIENCY: Status: ACTIVE | Noted: 2024-06-21

## 2024-06-21 NOTE — PROGRESS NOTES
"Chief Complaint  Establish Care and Hypertension    Subjective    {Problem List  Visit Diagnosis   Encounters  Notes  Medications  Labs  Result Review Imaging  Media :23}    Irlanda Henry presents to [facility]  History of Present Illness        Objective   Vital Signs:  /60 (BP Location: Left arm, Patient Position: Sitting, Cuff Size: Adult)   Pulse 69   Ht 165.1 cm (65\")   Wt 76.2 kg (168 lb)   SpO2 96%   BMI 27.96 kg/m²   Estimated body mass index is 27.96 kg/m² as calculated from the following:    Height as of this encounter: 165.1 cm (65\").    Weight as of this encounter: 76.2 kg (168 lb).       {BMI is >= 25 and <30. (Overweight) The following options were offered after discussion; (Optional):98543}      Physical Exam   Result Review :{Labs  Result Review  Imaging  Med Tab  Media  Procedures :23}    {The following data was reviewed by (Optional):44955}  {Ambulatory Labs (Optional):78726}  {Data reviewed (Optional):52031:::1}             Assessment and Plan {CC Problem List  Visit Diagnosis   ROS  Review (Popup)  Health Maintenance  Quality  BestPractice  Medications  SmartSets  SnapShot Encounters  Media :23}    There are no diagnoses linked to this encounter.       {Time Spent (Optional):26540}  Follow Up {Instructions Charge Capture  Follow-up Communications :23}    No follow-ups on file.  Patient was given instructions and counseling regarding her condition or for health maintenance advice. Please see specific information pulled into the AVS if appropriate.         "

## 2024-06-21 NOTE — ASSESSMENT & PLAN NOTE
Anticipatory guidance given regarding health prevention/wellness, diet/exercise, tobacco/alcohol/drug education, exercise and wellbeing, covid 19 guidance, vaccination recommendations, and sexual health/STD education.   Recommended bi-yearly dental exams and regular vision examinations.

## 2024-06-21 NOTE — PATIENT INSTRUCTIONS
Preventive Care 65 Years and Older, Female  Preventive care refers to lifestyle choices and visits with your health care provider that can promote health and wellness. Preventive care visits are also called wellness exams.  What can I expect for my preventive care visit?  Counseling  Your health care provider may ask you questions about your:  Medical history, including:  Past medical problems.  Family medical history.  Pregnancy and menstrual history.  History of falls.  Current health, including:  Memory and ability to understand (cognition).  Emotional well-being.  Home life and relationship well-being.  Sexual activity and sexual health.  Lifestyle, including:  Alcohol, nicotine or tobacco, and drug use.  Access to firearms.  Diet, exercise, and sleep habits.  Work and work environment.  Sunscreen use.  Safety issues such as seatbelt and bike helmet use.  Physical exam  Your health care provider will check your:  Height and weight. These may be used to calculate your BMI (body mass index). BMI is a measurement that tells if you are at a healthy weight.  Waist circumference. This measures the distance around your waistline. This measurement also tells if you are at a healthy weight and may help predict your risk of certain diseases, such as type 2 diabetes and high blood pressure.  Heart rate and blood pressure.  Body temperature.  Skin for abnormal spots.  What immunizations do I need?    Vaccines are usually given at various ages, according to a schedule. Your health care provider will recommend vaccines for you based on your age, medical history, and lifestyle or other factors, such as travel or where you work.  What tests do I need?  Screening  Your health care provider may recommend screening tests for certain conditions. This may include:  Lipid and cholesterol levels.  Hepatitis C test.  Hepatitis B test.  HIV (human immunodeficiency virus) test.  STI (sexually transmitted infection) testing, if you are at  risk.  Lung cancer screening.  Colorectal cancer screening.  Diabetes screening. This is done by checking your blood sugar (glucose) after you have not eaten for a while (fasting).  Mammogram. Talk with your health care provider about how often you should have regular mammograms.  BRCA-related cancer screening. This may be done if you have a family history of breast, ovarian, tubal, or peritoneal cancers.  Bone density scan. This is done to screen for osteoporosis.  Talk with your health care provider about your test results, treatment options, and if necessary, the need for more tests.  Follow these instructions at home:  Eating and drinking    Eat a diet that includes fresh fruits and vegetables, whole grains, lean protein, and low-fat dairy products. Limit your intake of foods with high amounts of sugar, saturated fats, and salt.  Take vitamin and mineral supplements as recommended by your health care provider.  Do not drink alcohol if your health care provider tells you not to drink.  If you drink alcohol:  Limit how much you have to 0-1 drink a day.  Know how much alcohol is in your drink. In the [place] one drink equals one 12 oz bottle of beer (355 mL), one 5 oz glass of wine (148 mL), or one 1½ oz glass of hard liquor (44 mL).  Lifestyle  Brush your teeth every morning and night with fluoride toothpaste. Floss one time each day.  Exercise for at least 30 minutes 5 or more days each week.  Do not use any products that contain nicotine or tobacco. These products include cigarettes, chewing tobacco, and vaping devices, such as e-cigarettes. If you need help quitting, ask your health care provider.  Do not use drugs.  If you are sexually active, practice safe sex. Use a condom or other form of protection in order to prevent STIs.  Take aspirin only as told by your health care provider. Make sure that you understand how much to take and what form to take. Work with your health care provider to find out whether it  is safe and beneficial for you to take aspirin daily.  Ask your health care provider if you need to take a cholesterol-lowering medicine (statin).  Find healthy ways to manage stress, such as:  Meditation, yoga, or listening to music.  Journaling.  Talking to a trusted person.  Spending time with friends and family.  Minimize exposure to UV radiation to reduce your risk of skin cancer.  Safety  Always wear your seat belt while driving or riding in a vehicle.  Do not drive:  If you have been drinking alcohol. Do not ride with someone who has been drinking.  When you are tired or distracted.  While texting.  If you have been using any mind-altering substances or drugs.  Wear a helmet and other protective equipment during sports activities.  If you have firearms in your house, make sure you follow all gun safety procedures.  What's next?  Visit your health care provider once a year for an annual wellness visit.  Ask your health care provider how often you should have your eyes and teeth checked.  Stay up to date on all vaccines.  This information is not intended to replace advice given to you by your health care provider. Make sure you discuss any questions you have with your health care provider.  Document Revised: 06/15/2022 Document Reviewed: 06/15/2022  Elsevier Patient Education © 2024 [employer] Inc[employer]

## 2024-06-21 NOTE — PROGRESS NOTES
The ABCs of the Annual Wellness Visit  Initial Medicare Wellness Visit    Subjective     Irlanda Henry is a 73 y.o. female who presents for an Initial Medicare Wellness Visit.    The following portions of the patient's history were reviewed and   updated as appropriate: allergies, current medications, past family history, past medical history, past social history, past surgical history, and problem list.     Compared to one year ago, the patient feels her physical   health is the same.    Compared to one year ago, the patient feels her mental   health is the same.    Recent Hospitalizations:  She was admitted within the past 365 days at [facility].     Family History   Problem Relation Age of Onset    Breast cancer Maternal Aunt     Heart attack Mother         in 90s     Kidney failure Father       Past Medical History:   Diagnosis Date    Cataract     Encounter for diagnostic colonoscopy due to change in bowel habits 06/21/2023    Polio     Vasovagal syncope      Social History     Tobacco Use   Smoking Status Never   Smokeless Tobacco Never     Social History     Substance and Sexual Activity   Alcohol Use Yes    Comment: rare       Current Medical Providers:  Patient Care Team:  Cris Rene APRN as PCP - General (Family Medicine)  Patricio Ulloa MD as Consulting Physician (Gastroenterology)  David Mcgill MD as Surgeon (Orthopedic Surgery)    Outpatient Medications Prior to Visit   Medication Sig Dispense Refill    olmesartan (BENICAR) 20 MG tablet TAKE 1 TABLET BY MOUTH DAILY 30 tablet 3    fluticasone (FLONASE) 50 MCG/ACT nasal spray 2 sprays into the nostril(s) as directed by provider Daily. (Patient not taking: Reported on 6/21/2024) 9.9 mL 0     No facility-administered medications prior to visit.       No opioid medication identified on active medication list. I have reviewed chart for other potential  high risk medication/s and harmful drug interactions in the  "elderly.        Aspirin is not on active medication list.  Aspirin use is contraindicated for this patient due to: patient does not necessary at this time .  .    Patient Active Problem List   Diagnosis    Chest pain    Post-polio limb muscle weakness    Gastroesophageal reflux disease with esophagitis    Allergic arthritis of right hip    Gout of hand    Chronic fatigue    Avitaminosis D    Nodular goiter, non-toxic    Thyroiditis    Polio    Lower abdominal pain    Immobility    Annual physical exam    Vulvar itching    Abnormal CT of the abdomen    Near syncope    Hypertension    History of solitary pulmonary nodule    History of vitamin D deficiency    Family history of diabetes mellitus     Advance Care Planning   Advance Care Planning     Advance Directive is not on file.  ACP discussion was held with the patient during this visit. Patient has an advance directive (not in EMR), copy requested.       Objective    Vitals:    06/21/24 1342   BP: 118/60   BP Location: Left arm   Patient Position: Sitting   Cuff Size: Adult   Pulse: 69   SpO2: 96%   Weight: 76.2 kg (168 lb)   Height: 165.1 cm (65\")     Estimated body mass index is 27.96 kg/m² as calculated from the following:    Height as of this encounter: 165.1 cm (65\").    Weight as of this encounter: 76.2 kg (168 lb).    BMI is >= 25 and <30. (Overweight) The following options were offered after discussion;: weight loss educational material (shared in after visit summary), exercise counseling/recommendations, nutrition counseling/recommendations, and information on healthy weight added to patient's after visit summary       Does the patient have evidence of cognitive impairment?   No          HEALTH RISK ASSESSMENT    Smoking Status:  Social History     Tobacco Use   Smoking Status Never   Smokeless Tobacco Never     Alcohol Consumption:  Social History     Substance and Sexual Activity   Alcohol Use Yes    Comment: rare     Fall Risk Screen:    STEADI Fall " Risk Assessment was completed, and patient is at LOW risk for falls.Assessment completed on:2024    Depression Screen:       2024     1:45 PM   PHQ-2/PHQ-9 Depression Screening   Little Interest or Pleasure in Doing Things 0-->not at all   Feeling Down, Depressed or Hopeless 0-->not at all   PHQ-9: Brief Depression Severity Measure Score 0       Health Habits and Functional and Cognitive Screenin/21/2024     2:12 PM   Functional & Cognitive Status   Do you have difficulty preparing food and eating? No   Do you have difficulty bathing yourself, getting dressed or grooming yourself? No   Do you have difficulty using the toilet? No   Do you have difficulty moving around from place to place? No   Do you have trouble with steps or getting out of a bed or a chair? No   Current Diet Well Balanced Diet   Dental Exam Not up to date   Eye Exam Up to date   Exercise (times per week) 0 times per week   Current Exercises Include No Regular Exercise   Do you need help using the phone?  No   Are you deaf or do you have serious difficulty hearing?  No   Do you need help to go to places out of walking distance? Yes   Do you need help shopping? No   Do you need help preparing meals?  No   Do you need help with housework?  No   Do you need help with laundry? No   Do you need help taking your medications? No   Do you need help managing money? No   Do you ever drive or ride in a car without wearing a seat belt? No   Have you felt unusual stress, anger or loneliness in the last month? Yes   Who do you live with? Alone   If you need help, do you have trouble finding someone available to you? No   Have you been bothered in the last four weeks by sexual problems? No   Do you have difficulty concentrating, remembering or making decisions? No       Age-appropriate Screening Schedule:  Refer to the list below for future screening recommendations based on patient's age, sex and/or medical conditions. Orders for these  recommended tests are listed in the plan section. The patient has been provided with a written plan.    Health Maintenance   Topic Date Due    DXA SCAN  Never done    COVID-19 Vaccine (4 - 2023-24 season) 06/23/2024 (Originally 9/1/2023)    RSV Vaccine - Adults (1 - 1-dose 60+ series) 06/21/2025 (Originally 8/7/2011)    Pneumococcal Vaccine 65+ (1 of 1 - PCV) 06/21/2025 (Originally 8/7/2016)    INFLUENZA VACCINE  08/01/2024    ANNUAL WELLNESS VISIT  06/21/2025    BMI FOLLOWUP  06/21/2025    MAMMOGRAM  10/17/2025    HEPATITIS C SCREENING  Discontinued    TDAP/TD VACCINES  Discontinued    ZOSTER VACCINE  Discontinued    COLORECTAL CANCER SCREENING  Discontinued          CMS Preventative Services Quick Reference  Risk Factors Identified During Encounter    Fall Risk-High or Moderate: Discussed Fall Prevention in the home, Information on Fall Prevention Shared in After Visit Summary, and Sit to Stand Exercise Information Shared in After Visit Summary    The above risks/problems have been discussed with the patient.  Pertinent information has been shared with the patient in the After Visit Summary.  An After Visit Summary and PPPS were made available to the patient.  Diagnoses and all orders for this visit:    1. Encounter for annual wellness visit (AWV) in Medicare patient (Primary)    2. Annual physical exam  Assessment & Plan:  Anticipatory guidance given regarding health prevention/wellness, diet/exercise, tobacco/alcohol/drug education, exercise and wellbeing, covid 19 guidance, vaccination recommendations, and sexual health/STD education.   Recommended bi-yearly dental exams and regular vision examinations.        3. Encounter to establish care    4. Encounter for osteoporosis screening in asymptomatic postmenopausal patient  -     DEXA Bone Density Axial; Future    5. Primary hypertension  -     CBC & Differential  -     Comprehensive Metabolic Panel  -     TSH Rfx On Abnormal To Free T4  -     Lipid Panel  -      "Microalbumin / Creatinine Urine Ratio - Urine, Clean Catch    6. History of solitary pulmonary nodule    7. Family history of diabetes mellitus  -     CT Chest Without Contrast; Future  -     Hemoglobin A1c    8. Encounter for counseling for care management of patient with chronic conditions and complex health needs using nurse-based model    9. Vitamin D deficiency  -     Vitamin D 25 hydroxy      Follow Up:  Next Medicare Wellness visit to be scheduled in 1 year.        Additional E&M Note during same encounter follows:  Patient has multiple medical problems which are significant and separately identifiable that require additional work above and beyond the Medicare Wellness Visit.      Chief Complaint  Establish Care and Hypertension    Subjective        HPI  Irlanda Henry is also being seen today for hypertension follow-up  She has significantly reduced her salt intake, she is complaint with medications and takes her blood pressure at home routinely with reports of 119-120 Sbp. Denies dizziness and lightheadedness. She states she is feeling better since taking the medication and is compliant.   Review of Systems   Constitutional: Negative.    HENT: Negative.     Eyes: Negative.    Respiratory: Negative.     Cardiovascular: Negative.    Endocrine: Negative.    Genitourinary: Negative.    Musculoskeletal:  Positive for gait problem.   Skin: Negative.    Allergic/Immunologic: Negative.    Hematological: Negative.    Psychiatric/Behavioral:  Negative for suicidal ideas. The patient is nervous/anxious.        Objective   Vital Signs:  /60 (BP Location: Left arm, Patient Position: Sitting, Cuff Size: Adult)   Pulse 69   Ht 165.1 cm (65\")   Wt 76.2 kg (168 lb)   SpO2 96%   BMI 27.96 kg/m²     Physical Exam  Vitals reviewed.   Constitutional:       General: She is awake.      Appearance: Normal appearance. She is well-groomed and overweight.   HENT:      Head: Normocephalic.      Right Ear: Hearing normal. "      Left Ear: Hearing normal.      Nose: Nose normal.      Mouth/Throat:      Lips: Pink.      Mouth: Mucous membranes are moist.   Eyes:      General: Lids are normal.      Pupils: Pupils are equal, round, and reactive to light.      Comments: Corrective lens present   Cardiovascular:      Rate and Rhythm: Normal rate and regular rhythm.      Pulses: Normal pulses.      Heart sounds: Normal heart sounds, S1 normal and S2 normal.   Pulmonary:      Effort: Pulmonary effort is normal.      Breath sounds: Normal breath sounds. No stridor, decreased air movement or transmitted upper airway sounds. No decreased breath sounds, wheezing, rhonchi or rales.   Abdominal:      General: Abdomen is flat. Bowel sounds are normal.      Palpations: Abdomen is soft.      Tenderness: There is no abdominal tenderness.   Musculoskeletal:      Comments: Patient has history of polio as child lower extremities weaker bilaterally -patient reports baseline.  She is currently in a transport chair during time of visit   Skin:     General: Skin is warm and dry.      Capillary Refill: Capillary refill takes less than 2 seconds.   Neurological:      General: No focal deficit present.      Mental Status: She is alert.   Psychiatric:         Attention and Perception: Attention normal.         Mood and Affect: Mood normal.         Speech: Speech normal.         Behavior: Behavior normal. Behavior is cooperative.         Thought Content: Thought content normal.         Cognition and Memory: Cognition normal.          The following data was reviewed by: JEFRY Pedro on 06/21/2024:        ED with Trenton Salinas MD (12/10/2023)   CT Chest With Contrast Diagnostic (10/26/2023 1:57 PM)      Assessment and Plan   Diagnoses and all orders for this visit:    1. Encounter for annual wellness visit (AWV) in Medicare patient (Primary)    2. Annual physical exam  Assessment & Plan:  Anticipatory guidance given regarding health prevention/wellness,  diet/exercise, tobacco/alcohol/drug education, exercise and wellbeing, covid 19 guidance, vaccination recommendations, and sexual health/STD education.   Recommended bi-yearly dental exams and regular vision examinations.        3. Encounter to establish care    4. Encounter for osteoporosis screening in asymptomatic postmenopausal patient  -     DEXA Bone Density Axial; Future    5. Primary hypertension  -     CBC & Differential  -     Comprehensive Metabolic Panel  -     TSH Rfx On Abnormal To Free T4  -     Lipid Panel  -     Microalbumin / Creatinine Urine Ratio - Urine, Clean Catch    6. History of solitary pulmonary nodule    7. Family history of diabetes mellitus  -     CT Chest Without Contrast; Future  -     Hemoglobin A1c    8. Encounter for counseling for care management of patient with chronic conditions and complex health needs using nurse-based model    9. Vitamin D deficiency  -     Vitamin D 25 hydroxy    Labs pending today. Following results our office will be in touch with you for follow up care if needed. Thank you for allowing us to care for you,  JEFRY Pedro        Follow Up   No follow-ups on file.  Patient was given instructions and counseling regarding her condition or for health maintenance advice. Please see specific information pulled into the AVS if appropriate.

## 2024-06-22 LAB
25(OH)D3+25(OH)D2 SERPL-MCNC: 43 NG/ML (ref 30–100)
ALBUMIN SERPL-MCNC: 4.3 G/DL (ref 3.8–4.8)
ALBUMIN/CREAT UR: <8 MG/G CREAT (ref 0–29)
ALP SERPL-CCNC: 50 IU/L (ref 44–121)
ALT SERPL-CCNC: 23 IU/L (ref 0–32)
AST SERPL-CCNC: 21 IU/L (ref 0–40)
BASOPHILS # BLD AUTO: 0.1 X10E3/UL (ref 0–0.2)
BASOPHILS NFR BLD AUTO: 1 %
BILIRUB SERPL-MCNC: 0.5 MG/DL (ref 0–1.2)
BUN SERPL-MCNC: 13 MG/DL (ref 8–27)
BUN/CREAT SERPL: 16 (ref 12–28)
CALCIUM SERPL-MCNC: 9.6 MG/DL (ref 8.7–10.3)
CHLORIDE SERPL-SCNC: 105 MMOL/L (ref 96–106)
CHOLEST SERPL-MCNC: 195 MG/DL (ref 100–199)
CO2 SERPL-SCNC: 23 MMOL/L (ref 20–29)
CREAT SERPL-MCNC: 0.79 MG/DL (ref 0.57–1)
CREAT UR-MCNC: 39.9 MG/DL
EGFRCR SERPLBLD CKD-EPI 2021: 79 ML/MIN/1.73
EOSINOPHIL # BLD AUTO: 0.1 X10E3/UL (ref 0–0.4)
EOSINOPHIL NFR BLD AUTO: 2 %
ERYTHROCYTE [DISTWIDTH] IN BLOOD BY AUTOMATED COUNT: 12.8 % (ref 11.7–15.4)
GLOBULIN SER CALC-MCNC: 2.3 G/DL (ref 1.5–4.5)
GLUCOSE SERPL-MCNC: 88 MG/DL (ref 70–99)
HBA1C MFR BLD: 6.2 % (ref 4.8–5.6)
HCT VFR BLD AUTO: 42.9 % (ref 34–46.6)
HDLC SERPL-MCNC: 72 MG/DL
HGB BLD-MCNC: 14.4 G/DL (ref 11.1–15.9)
IMM GRANULOCYTES # BLD AUTO: 0 X10E3/UL (ref 0–0.1)
IMM GRANULOCYTES NFR BLD AUTO: 0 %
LDLC SERPL CALC-MCNC: 111 MG/DL (ref 0–99)
LYMPHOCYTES # BLD AUTO: 0.8 X10E3/UL (ref 0.7–3.1)
LYMPHOCYTES NFR BLD AUTO: 15 %
MCH RBC QN AUTO: 30.4 PG (ref 26.6–33)
MCHC RBC AUTO-ENTMCNC: 33.6 G/DL (ref 31.5–35.7)
MCV RBC AUTO: 91 FL (ref 79–97)
MICROALBUMIN UR-MCNC: <3 UG/ML
MONOCYTES # BLD AUTO: 0.4 X10E3/UL (ref 0.1–0.9)
MONOCYTES NFR BLD AUTO: 9 %
NEUTROPHILS # BLD AUTO: 3.7 X10E3/UL (ref 1.4–7)
NEUTROPHILS NFR BLD AUTO: 73 %
PLATELET # BLD AUTO: 204 X10E3/UL (ref 150–450)
POTASSIUM SERPL-SCNC: 4.5 MMOL/L (ref 3.5–5.2)
PROT SERPL-MCNC: 6.6 G/DL (ref 6–8.5)
RBC # BLD AUTO: 4.74 X10E6/UL (ref 3.77–5.28)
SODIUM SERPL-SCNC: 142 MMOL/L (ref 134–144)
TRIGL SERPL-MCNC: 66 MG/DL (ref 0–149)
TSH SERPL DL<=0.005 MIU/L-ACNC: 3.41 UIU/ML (ref 0.45–4.5)
VLDLC SERPL CALC-MCNC: 12 MG/DL (ref 5–40)
WBC # BLD AUTO: 5 X10E3/UL (ref 3.4–10.8)

## 2024-06-24 ENCOUNTER — TELEPHONE (OUTPATIENT)
Dept: INTERNAL MEDICINE | Facility: CLINIC | Age: 74
End: 2024-06-24
Payer: MEDICARE

## 2024-06-24 ENCOUNTER — REFERRAL TRIAGE (OUTPATIENT)
Age: 74
End: 2024-06-24
Payer: MEDICARE

## 2024-06-24 NOTE — TELEPHONE ENCOUNTER
Called pt [phone] and left detail result message on cell. Pt made to call the office if any questions or concerns as well as viewing available on [employer].

## 2024-06-24 NOTE — PROGRESS NOTES
Ms. Henry,  I reviewed your lab work..  Your CBC is normal; no evidence of anemia or infection.  Your thyroid function is within normal range. Your CMP shows normal liver and kidney function. Your A1C (diabetes marker) is elevated along with your cholesterol. While your HDL (good cholesterol) was very good the higher the better, your total and LDL (bad cholesterol) were high. Please work on dietary changes- reducing added sugars, decreasing fried, greasy fatty fats, and increase foods with omega-3 fatty acids. Also, engaging in 30 minutes of exercise 5 times per week can improve your cholesterol.    We will continue to monitor your lab work at your next appointment.   Thank you for allowing us to care for you,  JEFRY Pedro

## 2024-06-26 ENCOUNTER — PATIENT OUTREACH (OUTPATIENT)
Age: 74
End: 2024-06-26
Payer: MEDICARE

## 2024-06-26 NOTE — OUTREACH NOTE
Social Work Assessment  Questions/Answers      Flowsheet Row Most Recent Value   Referral Source outpatient staff, outpatient clinic, physician   Reason for Consult community resources, transportation   Preferred Language English   Advance Care Planning Reviewed no concerns identified   Current Living Arrangements home   Potentially Unsafe Housing Conditions none   In the past 12 months has the electric, gas, oil, or water company threatened to shut off services in your home? No   Primary Care Provided by self   Provides Primary Care For no one   Family Caregiver if Needed child(alexandro), adult   Quality of Family Relationships helpful, involved, supportive   Employment Status retired   Source of Income unable to assess   Application for Public Assistance not applied   Usual Activity Tolerance moderate   Current Activity Tolerance moderate   Transportation Anticipated family or friend will provide          SDOH updated and reviewed with the patient during this program:  --     Employment: Not At Risk (6/26/2024)    Employment     Do you want help finding or keeping work or a job?: I do not need or want help      Financial Resource Strain: Low Risk  (6/26/2024)    Overall Financial Resource Strain (CARDIA)     Difficulty of Paying Living Expenses: Not very hard      --     Food Insecurity: No Food Insecurity (6/26/2024)    Hunger Vital Sign     Worried About Running Out of Food in the Last Year: Never true     Ran Out of Food in the Last Year: Never true      --     Housing Stability: Low Risk  (6/26/2024)    Housing Stability Vital Sign     Unable to Pay for Housing in the Last Year: No     Number of Times Moved in the Last Year: 1     Homeless in the Last Year: No      --     Transportation Needs: No Transportation Needs (6/26/2024)    PRAPARE - Transportation     Lack of Transportation (Medical): No     Lack of Transportation (Non-Medical): No      --     Utilities: Not At Risk (6/26/2024)    [facility] Utilities      Threatened with loss of utilities: No      Continuing Care   Community & DME   [facility] PLANNING & DEVELOPMENT    [address] , [place] [address]    Phone: [phone]    Resource for: Food Insecurity   [facility]    [address] [facility] [address]    Phone: [phone]    Resource for: Transportation Needs   [employer]    [address] [address]    Phone: [phone]    Resource for: Transportation Needs     Patient Outreach    MSW outreach to patient as requested per PCP referral for assistance with community resource needs. Patient states that she is currently utilizing her daughter for transportation to and from her provider appointments but is limited due to daughter's full time work schedule. Patient and MSW discussed transportation options including [employer] and [employer]. Patient states these transportation resources may be difficult for her to utilize as she is in a wheelchair and finds it difficult to navigate from front door to provider office.     MSW and patient briefly discussed caregiver assistance through [employer] to accompany her to PCP other provider appointments. Patient prefers to continue to utilize her daughter and states that [employer] services are something that she will consider in the future for appointments as needed. Patient denies additional needs from MSW at this time and will call back as needed.     Zhanna HOBSON -   Ambulatory Case Management    6/26/2024, 15:25 EDT

## 2024-06-26 NOTE — OUTREACH NOTE
MSW received referral from primary care providers office for assistance with community resources. MSW outreach to patient by phone and MSW left message and call back number. MSW will continue to attempt outreach for assistance.     Zhanna HOBSON -   Ambulatory Case Management    6/26/2024, 11:42 EDT

## 2024-07-09 ENCOUNTER — TELEPHONE (OUTPATIENT)
Dept: INTERNAL MEDICINE | Facility: CLINIC | Age: 74
End: 2024-07-09
Payer: MEDICARE

## 2024-07-09 NOTE — TELEPHONE ENCOUNTER
Caller: Irlanda Henry    Relationship: Self    Best call back number: [phone]     What is the medical concern/diagnosis: SCREENINGS    What specialty or service is being requested: RADIOLOGY        Any additional details: PATIENT IS CALLING TO CHECK THE STATUS OF X-RAYS THAT MS HANDY ORDERED, AND SHE HAS NOT HEARD ANYTHING YET.       SHE IS ALSO WANTING TO KNOW IF SHE CAN GET A LETTER ON [facility], TO TAKE ON THE AIRPLANE EXPLAINING WHY SHE WEARS THE LONG LEGGED METAL BRACE.  SHE STATES IT WILL TRIGGER THE ALARMS AND THE AIRLINES RECOMMEND SHE GET THE LETTER.  SHE STATES SHE WILL BE FLYING ON 07/16/2024 AND WOULD LIKE TO  BY THIS COMING FRIDAY 07/12/24.  SHE STATES RAJANI GRIFFIN, PATIENT'S DAUGHTER, WOULD BE THE PERSON TO  THE LETTER.    PLEASE ADVISE.

## 2024-07-10 ENCOUNTER — TELEPHONE (OUTPATIENT)
Dept: INTERNAL MEDICINE | Facility: CLINIC | Age: 74
End: 2024-07-10
Payer: MEDICARE

## 2024-07-10 NOTE — TELEPHONE ENCOUNTER
Called pt regarding message to the office. Pt wanted to check status of Dexa Scan and CT. Pt made aware of 8/6/24 date for both scan and CT.    Pt also wanted to follow up on letter for TSA and reason for her metal brace. Initial message forwarded to PCP for review

## 2024-07-13 ENCOUNTER — APPOINTMENT (OUTPATIENT)
Dept: GENERAL RADIOLOGY | Facility: HOSPITAL | Age: 74
End: 2024-07-13
Payer: MEDICARE

## 2024-07-13 ENCOUNTER — HOSPITAL ENCOUNTER (INPATIENT)
Facility: HOSPITAL | Age: 74
LOS: 1 days | Discharge: HOME | End: 2024-07-15
Attending: EMERGENCY MEDICINE | Admitting: INTERNAL MEDICINE
Payer: MEDICARE

## 2024-07-13 DIAGNOSIS — R55 NEAR SYNCOPE: ICD-10-CM

## 2024-07-13 DIAGNOSIS — I10 HYPERTENSION, UNSPECIFIED TYPE: ICD-10-CM

## 2024-07-13 DIAGNOSIS — R11.2 NAUSEA AND VOMITING, UNSPECIFIED VOMITING TYPE: ICD-10-CM

## 2024-07-13 DIAGNOSIS — R55 VASOVAGAL EPISODE: Primary | ICD-10-CM

## 2024-07-13 LAB
ALBUMIN SERPL-MCNC: 3.6 G/DL (ref 3.5–5.2)
ALBUMIN/GLOB SERPL: 1.6 G/DL
ALP SERPL-CCNC: 41 U/L (ref 39–117)
ALT SERPL W P-5'-P-CCNC: 10 U/L (ref 1–33)
ANION GAP SERPL CALCULATED.3IONS-SCNC: 8 MMOL/L (ref 5–15)
AST SERPL-CCNC: 18 U/L (ref 1–32)
B PARAPERT DNA SPEC QL NAA+PROBE: NOT DETECTED
B PERT DNA SPEC QL NAA+PROBE: NOT DETECTED
BACTERIA UR QL AUTO: NORMAL /HPF
BASOPHILS # BLD AUTO: 0.04 10*3/MM3 (ref 0–0.2)
BASOPHILS NFR BLD AUTO: 0.6 % (ref 0–1.5)
BILIRUB SERPL-MCNC: 0.4 MG/DL (ref 0–1.2)
BILIRUB UR QL STRIP: NEGATIVE
BUN SERPL-MCNC: 13 MG/DL (ref 8–23)
BUN/CREAT SERPL: 18.6 (ref 7–25)
C PNEUM DNA NPH QL NAA+NON-PROBE: NOT DETECTED
CALCIUM SPEC-SCNC: 8.4 MG/DL (ref 8.6–10.5)
CHLORIDE SERPL-SCNC: 112 MMOL/L (ref 98–107)
CLARITY UR: CLEAR
CO2 SERPL-SCNC: 22 MMOL/L (ref 22–29)
COLOR UR: YELLOW
CREAT SERPL-MCNC: 0.7 MG/DL (ref 0.57–1)
D-LACTATE SERPL-SCNC: 0.9 MMOL/L (ref 0.5–2)
DEPRECATED RDW RBC AUTO: 42 FL (ref 37–54)
EGFRCR SERPLBLD CKD-EPI 2021: 92 ML/MIN/1.73
EOSINOPHIL # BLD AUTO: 0.08 10*3/MM3 (ref 0–0.4)
EOSINOPHIL NFR BLD AUTO: 1.3 % (ref 0.3–6.2)
ERYTHROCYTE [DISTWIDTH] IN BLOOD BY AUTOMATED COUNT: 12.7 % (ref 12.3–15.4)
FLUAV SUBTYP SPEC NAA+PROBE: NOT DETECTED
FLUBV RNA ISLT QL NAA+PROBE: NOT DETECTED
GLOBULIN UR ELPH-MCNC: 2.2 GM/DL
GLUCOSE SERPL-MCNC: 113 MG/DL (ref 65–99)
GLUCOSE UR STRIP-MCNC: NEGATIVE MG/DL
HADV DNA SPEC NAA+PROBE: NOT DETECTED
HCOV 229E RNA SPEC QL NAA+PROBE: NOT DETECTED
HCOV HKU1 RNA SPEC QL NAA+PROBE: NOT DETECTED
HCOV NL63 RNA SPEC QL NAA+PROBE: NOT DETECTED
HCOV OC43 RNA SPEC QL NAA+PROBE: NOT DETECTED
HCT VFR BLD AUTO: 38.7 % (ref 34–46.6)
HGB BLD-MCNC: 12.6 G/DL (ref 12–15.9)
HGB UR QL STRIP.AUTO: NEGATIVE
HMPV RNA NPH QL NAA+NON-PROBE: NOT DETECTED
HPIV1 RNA ISLT QL NAA+PROBE: NOT DETECTED
HPIV2 RNA SPEC QL NAA+PROBE: NOT DETECTED
HPIV3 RNA NPH QL NAA+PROBE: NOT DETECTED
HPIV4 P GENE NPH QL NAA+PROBE: NOT DETECTED
HYALINE CASTS UR QL AUTO: NORMAL /LPF
IMM GRANULOCYTES # BLD AUTO: 0.08 10*3/MM3 (ref 0–0.05)
IMM GRANULOCYTES NFR BLD AUTO: 1.3 % (ref 0–0.5)
KETONES UR QL STRIP: NEGATIVE
LEUKOCYTE ESTERASE UR QL STRIP.AUTO: ABNORMAL
LIPASE SERPL-CCNC: 34 U/L (ref 13–60)
LYMPHOCYTES # BLD AUTO: 0.48 10*3/MM3 (ref 0.7–3.1)
LYMPHOCYTES NFR BLD AUTO: 7.8 % (ref 19.6–45.3)
M PNEUMO IGG SER IA-ACNC: NOT DETECTED
MAGNESIUM SERPL-MCNC: 2.1 MG/DL (ref 1.6–2.4)
MCH RBC QN AUTO: 29.8 PG (ref 26.6–33)
MCHC RBC AUTO-ENTMCNC: 32.6 G/DL (ref 31.5–35.7)
MCV RBC AUTO: 91.5 FL (ref 79–97)
MONOCYTES # BLD AUTO: 0.33 10*3/MM3 (ref 0.1–0.9)
MONOCYTES NFR BLD AUTO: 5.3 % (ref 5–12)
NEUTROPHILS NFR BLD AUTO: 5.18 10*3/MM3 (ref 1.7–7)
NEUTROPHILS NFR BLD AUTO: 83.7 % (ref 42.7–76)
NITRITE UR QL STRIP: NEGATIVE
PH UR STRIP.AUTO: 5.5 [PH] (ref 5–8)
PLATELET # BLD AUTO: 141 10*3/MM3 (ref 140–450)
PMV BLD AUTO: 10.2 FL (ref 6–12)
POTASSIUM SERPL-SCNC: 4.4 MMOL/L (ref 3.5–5.2)
PROCALCITONIN SERPL-MCNC: 0.05 NG/ML (ref 0–0.25)
PROT SERPL-MCNC: 5.8 G/DL (ref 6–8.5)
PROT UR QL STRIP: NEGATIVE
RBC # BLD AUTO: 4.23 10*6/MM3 (ref 3.77–5.28)
RBC # UR STRIP: NORMAL /HPF
REF LAB TEST METHOD: NORMAL
RHINOVIRUS RNA SPEC NAA+PROBE: NOT DETECTED
RSV RNA NPH QL NAA+NON-PROBE: NOT DETECTED
SARS-COV-2 RNA NPH QL NAA+NON-PROBE: NOT DETECTED
SODIUM SERPL-SCNC: 142 MMOL/L (ref 136–145)
SP GR UR STRIP: 1.01 (ref 1–1.03)
SQUAMOUS #/AREA URNS HPF: NORMAL /HPF
UROBILINOGEN UR QL STRIP: ABNORMAL
WBC # UR STRIP: NORMAL /HPF
WBC NRBC COR # BLD AUTO: 6.19 10*3/MM3 (ref 3.4–10.8)

## 2024-07-13 PROCEDURE — 93010 ELECTROCARDIOGRAM REPORT: CPT | Performed by: INTERNAL MEDICINE

## 2024-07-13 PROCEDURE — 80053 COMPREHEN METABOLIC PANEL: CPT | Performed by: EMERGENCY MEDICINE

## 2024-07-13 PROCEDURE — 83605 ASSAY OF LACTIC ACID: CPT | Performed by: EMERGENCY MEDICINE

## 2024-07-13 PROCEDURE — 85025 COMPLETE CBC W/AUTO DIFF WBC: CPT | Performed by: EMERGENCY MEDICINE

## 2024-07-13 PROCEDURE — 0202U NFCT DS 22 TRGT SARS-COV-2: CPT | Performed by: EMERGENCY MEDICINE

## 2024-07-13 PROCEDURE — 81001 URINALYSIS AUTO W/SCOPE: CPT | Performed by: EMERGENCY MEDICINE

## 2024-07-13 PROCEDURE — G0378 HOSPITAL OBSERVATION PER HR: HCPCS

## 2024-07-13 PROCEDURE — 83735 ASSAY OF MAGNESIUM: CPT | Performed by: EMERGENCY MEDICINE

## 2024-07-13 PROCEDURE — 71045 X-RAY EXAM CHEST 1 VIEW: CPT

## 2024-07-13 PROCEDURE — P9612 CATHETERIZE FOR URINE SPEC: HCPCS

## 2024-07-13 PROCEDURE — 84145 PROCALCITONIN (PCT): CPT | Performed by: EMERGENCY MEDICINE

## 2024-07-13 PROCEDURE — 36415 COLL VENOUS BLD VENIPUNCTURE: CPT

## 2024-07-13 PROCEDURE — 93005 ELECTROCARDIOGRAM TRACING: CPT | Performed by: EMERGENCY MEDICINE

## 2024-07-13 PROCEDURE — 25810000003 SODIUM CHLORIDE 0.9 % SOLUTION: Performed by: INTERNAL MEDICINE

## 2024-07-13 PROCEDURE — 99285 EMERGENCY DEPT VISIT HI MDM: CPT

## 2024-07-13 PROCEDURE — 25810000003 SODIUM CHLORIDE 0.9 % SOLUTION: Performed by: EMERGENCY MEDICINE

## 2024-07-13 PROCEDURE — 83690 ASSAY OF LIPASE: CPT | Performed by: EMERGENCY MEDICINE

## 2024-07-13 RX ORDER — AMOXICILLIN 250 MG
2 CAPSULE ORAL 2 TIMES DAILY PRN
Status: DISCONTINUED | OUTPATIENT
Start: 2024-07-13 | End: 2024-07-15 | Stop reason: HOSPADM

## 2024-07-13 RX ORDER — ACETAMINOPHEN 325 MG/1
650 TABLET ORAL EVERY 4 HOURS PRN
Status: DISCONTINUED | OUTPATIENT
Start: 2024-07-13 | End: 2024-07-15 | Stop reason: HOSPADM

## 2024-07-13 RX ORDER — ACETAMINOPHEN 160 MG/5ML
650 SOLUTION ORAL EVERY 4 HOURS PRN
Status: DISCONTINUED | OUTPATIENT
Start: 2024-07-13 | End: 2024-07-15 | Stop reason: HOSPADM

## 2024-07-13 RX ORDER — ONDANSETRON 4 MG/1
4 TABLET, ORALLY DISINTEGRATING ORAL EVERY 6 HOURS PRN
Status: DISCONTINUED | OUTPATIENT
Start: 2024-07-13 | End: 2024-07-15 | Stop reason: HOSPADM

## 2024-07-13 RX ORDER — LOSARTAN POTASSIUM 25 MG/1
25 TABLET ORAL
Status: DISCONTINUED | OUTPATIENT
Start: 2024-07-14 | End: 2024-07-14

## 2024-07-13 RX ORDER — BISACODYL 5 MG/1
5 TABLET, DELAYED RELEASE ORAL DAILY PRN
Status: DISCONTINUED | OUTPATIENT
Start: 2024-07-13 | End: 2024-07-15 | Stop reason: HOSPADM

## 2024-07-13 RX ORDER — ACETAMINOPHEN 650 MG/1
650 SUPPOSITORY RECTAL EVERY 4 HOURS PRN
Status: DISCONTINUED | OUTPATIENT
Start: 2024-07-13 | End: 2024-07-15 | Stop reason: HOSPADM

## 2024-07-13 RX ORDER — SODIUM CHLORIDE 9 MG/ML
75 INJECTION, SOLUTION INTRAVENOUS CONTINUOUS
Status: DISCONTINUED | OUTPATIENT
Start: 2024-07-13 | End: 2024-07-15

## 2024-07-13 RX ORDER — POLYETHYLENE GLYCOL 3350 17 G/17G
17 POWDER, FOR SOLUTION ORAL DAILY PRN
Status: DISCONTINUED | OUTPATIENT
Start: 2024-07-13 | End: 2024-07-15 | Stop reason: HOSPADM

## 2024-07-13 RX ORDER — BISACODYL 10 MG
10 SUPPOSITORY, RECTAL RECTAL DAILY PRN
Status: DISCONTINUED | OUTPATIENT
Start: 2024-07-13 | End: 2024-07-15 | Stop reason: HOSPADM

## 2024-07-13 RX ORDER — ONDANSETRON 2 MG/ML
4 INJECTION INTRAMUSCULAR; INTRAVENOUS EVERY 6 HOURS PRN
Status: DISCONTINUED | OUTPATIENT
Start: 2024-07-13 | End: 2024-07-15 | Stop reason: HOSPADM

## 2024-07-13 RX ADMIN — SODIUM CHLORIDE 1000 ML: 9 INJECTION, SOLUTION INTRAVENOUS at 14:26

## 2024-07-13 RX ADMIN — SODIUM CHLORIDE 75 ML/HR: 9 INJECTION, SOLUTION INTRAVENOUS at 23:24

## 2024-07-13 NOTE — ED PROVIDER NOTES
EMERGENCY DEPARTMENT ENCOUNTER    Room Number:  S503/1  PCP: Cris Rene APRN  Historian: Patient, EMS      HPI:  Chief Complaint: Near syncope  A complete HPI/ROS/PMH/PSH/SH/FH are unobtainable due to: None    Context: Irlanda Henry is a 73 y.o. female who presents to the ED via [facility] from home c/o acute near syncopal episode while on the toilet.  Patient has been having nausea, vomiting, diarrhea, and cough today.  Woke up feeling fine symptoms that are suddenly later this morning.  Does have a history of vasovagal episodes.  Currently reports feeling chilled, denies any abdominal pain.  Does have some shortness of breath with the cough.      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    Gout of hand 04/11/2019    Chronic fatigue 04/11/2019    Avitaminosis D 04/29/2021    Nodular goiter, non-toxic 04/29/2021    Thyroiditis 04/29/2021    Polio     Lower abdominal pain 06/16/2023    Immobility 06/20/2023    Annual physical exam 06/21/2023    Vulvar itching 06/21/2023    Abnormal CT of the abdomen 06/22/2023    Near syncope 06/22/2023    Hypertension 12/15/2023    History of solitary pulmonary nodule 06/21/2024    History of vitamin D deficiency 06/21/2024    Family history of diabetes mellitus 06/21/2024     Resolved Ambulatory Problems     Diagnosis Date Noted    Costochondritis, acute 09/01/2017     Past Medical History:   Diagnosis Date    Cataract     Encounter for diagnostic colonoscopy due to change in bowel habits 06/21/2023    Vasovagal syncope          PAST SURGICAL HISTORY  Past Surgical History:   Procedure Laterality Date    CARDIAC  CATHETERIZATION N/A 09/02/2017    Procedure: Left Heart Cath;  Surgeon: Jose Murphy MD;  Location:  [facility];  Service:     CARDIAC CATHETERIZATION N/A 09/02/2017    Procedure: Coronary angiography;  Surgeon: Jose Murphy MD;  Location:  [facility];  Service:     CARPAL TUNNEL RELEASE      CATARACT EXTRACTION      CHOLECYSTECTOMY      COLONOSCOPY      COLONOSCOPY N/A 6/22/2023    Procedure: COLONOSCOPY to cecum;  Surgeon: Patricio Ulloa MD;  Location:  [facility];  Service: Gastroenterology;  Laterality: N/A;  PRE - abd pain  POST - diverticulosis    LEG SURGERY      multiple surgeries for polio    RETINAL DETACHMENT REPAIR Left          FAMILY HISTORY  Family History   Problem Relation Age of Onset    Breast cancer Maternal Aunt     Heart attack Mother         in 90s     Kidney failure Father          SOCIAL HISTORY  Social History     Socioeconomic History    Marital status: Legally    Tobacco Use    Smoking status: Never    Smokeless tobacco: Never   Vaping Use    Vaping status: Never Used   Substance and Sexual Activity    Alcohol use: Yes     Comment: rare    Drug use: No    Sexual activity: Not Currently     Birth control/protection: None         ALLERGIES  Ct contrast        REVIEW OF SYSTEMS  Review of Systems     All systems reviewed and negative except for those discussed in HPI.       PHYSICAL EXAM    I have reviewed the triage vital signs and nursing notes.    ED Triage Vitals [07/13/24 1358]   Temp Heart Rate Resp BP SpO2   96.3 °F (35.7 °C) 57 16 116/80 98 %      Temp src Heart Rate Source Patient Position BP Location FiO2 (%)   -- -- Sitting Right arm --       Physical Exam  General: Ill-appearing, nontoxic, rigors  HEENT: Mucous membranes tacky, atraumatic, EOMI  Neck: Full ROM  Pulm: Symmetric chest rise, nonlabored, lungs slightly diminished bilaterally but no overt wheezes/rales/rhonchi  Cardiovascular: Regular rate and rhythm,  intact distal pulses  GI: Soft, nontender, nondistended, no rebound, no guarding, bowel sounds present  MSK: Full ROM, no deformity  Skin: Warm, dry  Neuro: Awake, alert, oriented x 4, GCS 15, moving all extremities, no focal deficits  Psych: Anxious, cooperative        LAB RESULTS  Recent Results (from the past 24 hour(s))   Comprehensive Metabolic Panel    Collection Time: 07/13/24  2:17 PM    Specimen: Blood   Result Value Ref Range    Glucose 113 (H) 65 - 99 mg/dL    BUN 13 8 - 23 mg/dL    Creatinine 0.70 0.57 - 1.00 mg/dL    Sodium 142 136 - 145 mmol/L    Potassium 4.4 3.5 - 5.2 mmol/L    Chloride 112 (H) 98 - 107 mmol/L    CO2 22.0 22.0 - 29.0 mmol/L    Calcium 8.4 (L) 8.6 - 10.5 mg/dL    Total Protein 5.8 (L) 6.0 - 8.5 g/dL    Albumin 3.6 3.5 - 5.2 g/dL    ALT (SGPT) 10 1 - 33 U/L    AST (SGOT) 18 1 - 32 U/L    Alkaline Phosphatase 41 39 - 117 U/L    Total Bilirubin 0.4 0.0 - 1.2 mg/dL    Globulin 2.2 gm/dL    A/G Ratio 1.6 g/dL    BUN/Creatinine Ratio 18.6 7.0 - 25.0    Anion Gap 8.0 5.0 - 15.0 mmol/L    eGFR 92.0 >60.0 mL/min/1.73   Lipase    Collection Time: 07/13/24  2:17 PM    Specimen: Blood   Result Value Ref Range    Lipase 34 13 - 60 U/L   Lactic Acid, Plasma    Collection Time: 07/13/24  2:17 PM    Specimen: Blood   Result Value Ref Range    Lactate 0.9 0.5 - 2.0 mmol/L   Magnesium    Collection Time: 07/13/24  2:17 PM    Specimen: Blood   Result Value Ref Range    Magnesium 2.1 1.6 - 2.4 mg/dL   CBC Auto Differential    Collection Time: 07/13/24  2:17 PM    Specimen: Blood   Result Value Ref Range    WBC 6.19 3.40 - 10.80 10*3/mm3    RBC 4.23 3.77 - 5.28 10*6/mm3    Hemoglobin 12.6 12.0 - 15.9 g/dL    Hematocrit 38.7 34.0 - 46.6 %    MCV 91.5 79.0 - 97.0 fL    MCH 29.8 26.6 - 33.0 pg    MCHC 32.6 31.5 - 35.7 g/dL    RDW 12.7 12.3 - 15.4 %    RDW-SD 42.0 37.0 - 54.0 fl    MPV 10.2 6.0 - 12.0 fL    Platelets 141 140 - 450 10*3/mm3    Neutrophil % 83.7 (H) 42.7 - 76.0 %    Lymphocyte % 7.8 (L) 19.6 -  45.3 %    Monocyte % 5.3 5.0 - 12.0 %    Eosinophil % 1.3 0.3 - 6.2 %    Basophil % 0.6 0.0 - 1.5 %    Immature Grans % 1.3 (H) 0.0 - 0.5 %    Neutrophils, Absolute 5.18 1.70 - 7.00 10*3/mm3    Lymphocytes, Absolute 0.48 (L) 0.70 - 3.10 10*3/mm3    Monocytes, Absolute 0.33 0.10 - 0.90 10*3/mm3    Eosinophils, Absolute 0.08 0.00 - 0.40 10*3/mm3    Basophils, Absolute 0.04 0.00 - 0.20 10*3/mm3    Immature Grans, Absolute 0.08 (H) 0.00 - 0.05 10*3/mm3   Procalcitonin    Collection Time: 07/13/24  2:17 PM    Specimen: Blood   Result Value Ref Range    Procalcitonin 0.05 0.00 - 0.25 ng/mL   Urinalysis With Microscopic If Indicated (No Culture) - Straight Cath    Collection Time: 07/13/24  2:24 PM    Specimen: Straight Cath; Urine   Result Value Ref Range    Color, UA Yellow Yellow, Straw    Appearance, UA Clear Clear    pH, UA 5.5 5.0 - 8.0    Specific Gravity, UA 1.012 1.005 - 1.030    Glucose, UA Negative Negative    Ketones, UA Negative Negative    Bilirubin, UA Negative Negative    Blood, UA Negative Negative    Protein, UA Negative Negative    Leuk Esterase, UA Small (1+) (A) Negative    Nitrite, UA Negative Negative    Urobilinogen, UA 0.2 E.U./dL 0.2 - 1.0 E.U./dL   Urinalysis, Microscopic Only - Straight Cath    Collection Time: 07/13/24  2:24 PM    Specimen: Straight Cath; Urine   Result Value Ref Range    RBC, UA 0-2 None Seen, 0-2 /HPF    WBC, UA 0-2 None Seen, 0-2 /HPF    Bacteria, UA None Seen None Seen /HPF    Squamous Epithelial Cells, UA 0-2 None Seen, 0-2 /HPF    Hyaline Casts, UA 0-2 None Seen /LPF    Methodology Automated Microscopy    Respiratory Panel PCR w/COVID-19(SARS-CoV-2) [facility], NP Swab in UTM/VTM, 2 HR TAT - Swab, Nasopharynx    Collection Time: 07/13/24  2:54 PM    Specimen: Nasopharynx; Swab   Result Value Ref Range    ADENOVIRUS, PCR Not Detected Not Detected    Coronavirus 229E Not Detected Not Detected    Coronavirus HKU1 Not Detected Not Detected    Coronavirus  NL63 Not Detected Not Detected    Coronavirus OC43 Not Detected Not Detected    COVID19 Not Detected Not Detected - Ref. Range    Human Metapneumovirus Not Detected Not Detected    Human Rhinovirus/Enterovirus Not Detected Not Detected    Influenza A PCR Not Detected Not Detected    Influenza B PCR Not Detected Not Detected    Parainfluenza Virus 1 Not Detected Not Detected    Parainfluenza Virus 2 Not Detected Not Detected    Parainfluenza Virus 3 Not Detected Not Detected    Parainfluenza Virus 4 Not Detected Not Detected    RSV, PCR Not Detected Not Detected    Bordetella pertussis pcr Not Detected Not Detected    Bordetella parapertussis PCR Not Detected Not Detected    Chlamydophila pneumoniae PCR Not Detected Not Detected    Mycoplasma pneumo by PCR Not Detected Not Detected   ECG 12 Lead Other; near syncope    Collection Time: 07/13/24  5:42 PM   Result Value Ref Range    QT Interval 419 ms    QTC Interval 429 ms       Ordered the above labs and independently interpreted results. My findings will be discussed in the medical decision making section below        RADIOLOGY  XR Chest 1 View    Result Date: 7/13/2024  CHEST SINGLE VIEW  HISTORY: Cough. Dyspnea.  COMPARISON: CT chest 10/26/2023, two-view chest 08/31/2017.  FINDINGS: Patient is rotated to the right and, allowing for this, the cardiomediastinal silhouette has not changed. The thoracic aorta appears tortuous. There is mild elevation of left hemidiaphragm. Lungs appear clear and there is no evidence for pleural effusion or pneumothorax.      No evidence for active disease in the chest.  This report was finalized on 7/13/2024 4:22 PM by Dr. Trenton Kitchen M.D on Workstation: [id number]       Ordered the above noted radiological studies.  Independently interpreted by me and my independent review of findings can be found in the ED Course.  See dictation for official radiology interpretation.      PROCEDURES    Procedures        MEDICATIONS GIVEN IN  ER    Medications   acetaminophen (TYLENOL) tablet 650 mg (has no administration in time range)     Or   acetaminophen (TYLENOL) 160 MG/5ML oral solution 650 mg (has no administration in time range)     Or   acetaminophen (TYLENOL) suppository 650 mg (has no administration in time range)   ondansetron ODT (ZOFRAN-ODT) disintegrating tablet 4 mg (has no administration in time range)     Or   ondansetron (ZOFRAN) injection 4 mg (has no administration in time range)   melatonin tablet 2.5 mg (has no administration in time range)   sennosides-docusate (PERICOLACE) 8.6-50 MG per tablet 2 tablet (has no administration in time range)     And   polyethylene glycol (MIRALAX) packet 17 g (has no administration in time range)     And   bisacodyl (DULCOLAX) EC tablet 5 mg (has no administration in time range)     And   bisacodyl (DULCOLAX) suppository 10 mg (has no administration in time range)   sodium chloride 0.9 % bolus 1,000 mL (0 mL Intravenous Stopped 7/13/24 4860)         PROGRESS, DATA ANALYSIS, CONSULTS, AND MEDICAL DECISION MAKING    Please note that this section constitutes my independent interpretation of clinical data including lab results, radiology, EKG's.  This constitutes my independent professional opinion regarding differential diagnosis and management of this patient.  It may include any factors such as history from outside sources, review of external records, social determinants of health, management of medications, response to those treatments, and discussions with other providers.    Differential Diagnosis and Plan: Initial concern for viral syndrome, UTI, pyelonephritis, sepsis, pneumonia, electrolyte abnormalities, renal failure, among others.  The episode on the toilet sounds like a vasovagal episode.  Plan for labs, supportive care, chest x-ray, potentially CT of the abdomen pelvis, and reevaluation with results    Additional sources:  - Discussed/ obtained information from independent historians:  EMS reports giving IV fluids and IV Zofran prior to arrival     - (Social Determinants of Health): None     - Shared decision making:  Patient fully updated on and in agreement with the course and plan moving forward    ED Course as of 07/13/24 1929   Sat Jul 13, 2024   1521 XR Chest 1 View  Per my independent interpretation of the chest x-ray, no evidence of pneumothorax or obvious pneumonia [DC]   1529 Nitrite, UA: Negative [DC]   1529 Leukocytes, UA(!): Small (1+) [DC]   1529 Blood, UA: Negative [DC]   1529 Ketones, UA: Negative [DC]   1529 Bacteria, UA: None Seen [DC]   1529 WBC, UA: 0-2 [DC]   1529 RBC, UA: 0-2 [DC]   1530 WBC: 6.19 [DC]   1530 Hemoglobin: 12.6 [DC]   1531 Platelets: 141 [DC]   1541 Lactate: 0.9 [DC]   1545 Lipase: 34 [DC]   1546 Magnesium: 2.1 [DC]   1546 Glucose(!): 113 [DC]   1546 BUN: 13 [DC]   1546 Creatinine: 0.70 [DC]   1546 Sodium: 142 [DC]   1546 Potassium: 4.4 [DC]   1546 ALT (SGPT): 10 [DC]   1546 AST (SGOT): 18 [DC]   1546 Alkaline Phosphatase: 41 [DC]   1546 Total Bilirubin: 0.4 [DC]   1550 Procalcitonin: 0.05 [DC]   1618 Respiratory Panel PCR w/COVID-19(SARS-CoV-2) [facility], NP Swab in UTM/VTM, 2 HR TAT - Swab, Nasopharynx [DC]   1630 Patient updated on the reassuring workup today, will p.o. challenge and ambulate and I will reevaluate.  Suspect severe vasovagal reaction but no evidence of any acute emergent process at this time. [DC]   1729 On reevaluation the patient is not feeling strong enough to try to go home, will plan for observation overnight for further monitoring. [DC]      ED Course User Index  [DC] Chato Cordova MD       Hospitalization Considered?: yes    Orders Placed During This Visit:  Orders Placed This Encounter   Procedures    Respiratory Panel PCR w/COVID-19(SARS-CoV-2) [facility], NP Swab in UTM/VTM, 2 HR TAT - Swab, Nasopharynx    XR Chest 1 View    Comprehensive Metabolic Panel    Lipase    Urinalysis With  Microscopic If Indicated (No Culture) - Urine, Clean Catch    Lactic Acid, Plasma    Magnesium    CBC Auto Differential    Procalcitonin    Urinalysis, Microscopic Only - Urine, Clean Catch    Basic Metabolic Panel    CBC (No Diff)    Diet: Cardiac; Healthy Heart (2-3 Na+); Fluid Consistency: Thin (IDDSI 0)    Vital Signs    Up with assistance    Daily Weights    Strict Intake & Output    Oral Care    Place Sequential Compression Device    Maintain Sequential Compression Device    Code Status and Medical Interventions:    [facility] (on-call MD unless specified) Details    ECG 12 Lead Other; near syncope    Initiate Observation Status    CBC & Differential       Additional orders considered but not placed:      Independent interpretation of labs, radiology studies, and discussions with consultants: See ED Course        AS OF 19:29 EDT VITALS:    BP - 141/69  HR - 72  TEMP - 96.8 °F (36 °C)  02 SATS - 96%          DIAGNOSIS  Final diagnoses:   Vasovagal episode   Near syncope   Nausea and vomiting, unspecified vomiting type         DISPOSITION  ED Disposition       ED Disposition   Decision to Admit    Condition   --    Comment   Level of Care: Telemetry [5]   Diagnosis: Near syncope [058905]   Admitting Physician: JOCY RETANA [[id number]]   Attending Physician: JOCY RETANA [[id number]]                  Please note that portions of this document were completed with a voice recognition program.    Note Disclaimer: At [facility], we believe that sharing information builds trust and better relationships. You are receiving this note because you recently visited [facility]. It is possible you will see health information before a provider has talked with you about it. This kind of information can be easy to misunderstand. To help you fully understand what it means for your health, we urge you to discuss this note with your provider.                       Chato Cordova MD  07/13/24 1930

## 2024-07-13 NOTE — ED NOTES
.Nursing report ED to floor  Irlanda Henry  73 y.o.  female    HPI :  HPI (Adult)  Stated Reason for Visit: n/v/d  History Obtained From: EMS    Chief Complaint  Chief Complaint   Patient presents with    Vomiting    Diarrhea    Near Syncope       Admitting doctor:   Kena Bright MD    Admitting diagnosis:   The primary encounter diagnosis was Vasovagal episode. Diagnoses of Near syncope and Nausea and vomiting, unspecified vomiting type were also pertinent to this visit.    Code status:   Current Code Status       Date Active Code Status Order ID Comments User Context       7/13/2024 1748 CPR (Attempt to Resuscitate) [id number]  Kena Bright MD ED        Question Answer    Code Status (Patient has no pulse and is not breathing) CPR (Attempt to Resuscitate)    Medical Interventions (Patient has pulse or is breathing) Full                    Allergies:   Ct contrast    Isolation:   No active isolations    Intake and Output    Intake/Output Summary (Last 24 hours) at 7/13/2024 1759  Last data filed at 7/13/2024 1454  Gross per 24 hour   Intake 1500 ml   Output --   Net 1500 ml       Weight:       07/13/24  1358   Weight: 74.8 kg (165 lb)       Most recent vitals:   Vitals:    07/13/24 1501 07/13/24 1541 07/13/24 1701 07/13/24 1758   BP: 110/50  141/69    BP Location:       Patient Position:       Pulse:  78 62 72   Resp:  16 16    Temp:       SpO2:  99% 98% 96%   Weight:       Height:           Active LDAs/IV Access:   Lines, Drains & Airways       Active LDAs       Name Placement date Placement time Site Days    Peripheral IV 07/13/24 1357 Anterior;Proximal;Right Forearm 07/13/24  1357  Forearm  less than 1                    Labs (abnormal labs have a star):   Labs Reviewed   COMPREHENSIVE METABOLIC PANEL - Abnormal; Notable for the following components:       Result Value    Glucose 113 (*)     Chloride 112 (*)     Calcium 8.4 (*)     Total Protein 5.8 (*)     All other components within normal  "limits    Narrative:     GFR Normal >60  Chronic Kidney Disease <60  Kidney Failure <15    The GFR formula is only valid for adults with stable renal function between ages 18 and 70.   URINALYSIS W/ MICROSCOPIC IF INDICATED (NO CULTURE) - Abnormal; Notable for the following components:    Leuk Esterase, UA Small (1+) (*)     All other components within normal limits   CBC WITH AUTO DIFFERENTIAL - Abnormal; Notable for the following components:    Neutrophil % 83.7 (*)     Lymphocyte % 7.8 (*)     Immature Grans % 1.3 (*)     Lymphocytes, Absolute 0.48 (*)     Immature Grans, Absolute 0.08 (*)     All other components within normal limits   RESPIRATORY PANEL PCR W/ COVID-19 (SARS-COV-2), NP SWAB IN UTM/VTP, 2 HR TAT - Normal    Narrative:     In the setting of a positive respiratory panel with a viral infection PLUS a negative procalcitonin without other underlying concern for bacterial infection, consider observing off antibiotics or discontinuation of antibiotics and continue supportive care. If the respiratory panel is positive for atypical bacterial infection (Bordetella pertussis, Chlamydophila pneumoniae, or Mycoplasma pneumoniae), consider antibiotic de-escalation to target atypical bacterial infection.   LIPASE - Normal   LACTIC ACID, PLASMA - Normal   MAGNESIUM - Normal   PROCALCITONIN - Normal    Narrative:     As a Marker for Sepsis (Non-Neonates):    1. <0.5 ng/mL represents a low risk of severe sepsis and/or septic shock.  2. >2 ng/mL represents a high risk of severe sepsis and/or septic shock.    As a Marker for Lower Respiratory Tract Infections that require antibiotic therapy:    PCT on Admission    Antibiotic Therapy       6-12 Hrs later    >0.5                Strongly Recommended  >0.25 - <0.5        Recommended   0.1 - 0.25          Discouraged              Remeasure/reassess PCT  <0.1                Strongly Discouraged     Remeasure/reassess PCT    As 28 day mortality risk marker: \"Change in " "Procalcitonin Result\" (>80% or <=80%) if Day 0 (or Day 1) and Day 4 values are available. Refer to [email or web address] [email or web address]    Change in PCT <=80%  A decrease of PCT levels below or equal to 80% defines a positive change in PCT test result representing a higher risk for 28-day all-cause mortality of patients diagnosed with severe sepsis for septic shock.    Change in PCT >80%  A decrease of PCT levels of more than 80% defines a negative change in PCT result representing a lower risk for 28-day all-cause mortality of patients diagnosed with severe sepsis or septic shock.      URINALYSIS, MICROSCOPIC ONLY   CBC AND DIFFERENTIAL    Narrative:     The following orders were created for panel order CBC & Differential.  Procedure                               Abnormality         Status                     ---------                               -----------         ------                     CBC Auto Differential[[id number]]        Abnormal            Final result                 Please view results for these tests on the individual orders.       EKG:   ECG 12 Lead Other; near syncope   Preliminary Result   HEART RATE=63  bpm   RR Hhbdcexo=770  ms   CA Ejiondar=668  ms   P Horizontal Axis=30  deg   P Front Axis=54  deg   QRSD Interval=96  ms   QT Ohrfuglf=916  ms   PJiB=974  ms   QRS Axis=39  deg   T Wave Axis=60  deg   - OTHERWISE NORMAL ECG -   Sinus rhythm   Low voltage, extremity leads   Abnormal R-wave progression, early transition   Date and Time of Study:2024-07-13 17:42:08          Meds given in ED:   Medications   acetaminophen (TYLENOL) tablet 650 mg (has no administration in time range)     Or   acetaminophen (TYLENOL) 160 MG/5ML oral solution 650 mg (has no administration in time range)     Or   acetaminophen (TYLENOL) suppository 650 mg (has no administration in time range)   ondansetron ODT (ZOFRAN-ODT) disintegrating tablet 4 mg (has no administration in time range)     Or   ondansetron (ZOFRAN) " injection 4 mg (has no administration in time range)   melatonin tablet 2.5 mg (has no administration in time range)   sennosides-docusate (PERICOLACE) 8.6-50 MG per tablet 2 tablet (has no administration in time range)     And   polyethylene glycol (MIRALAX) packet 17 g (has no administration in time range)     And   bisacodyl (DULCOLAX) EC tablet 5 mg (has no administration in time range)     And   bisacodyl (DULCOLAX) suppository 10 mg (has no administration in time range)   sodium chloride 0.9 % bolus 1,000 mL (0 mL Intravenous Stopped 7/13/24 4694)       Imaging results:  XR Chest 1 View    Result Date: 7/13/2024  No evidence for active disease in the chest.  This report was finalized on 7/13/2024 4:22 PM by Dr. Trenton Kitchen M.D on Workstation: [id number]       Ambulatory status:   - up w/assist    Social issues:   Social History     Socioeconomic History    Marital status: Legally    Tobacco Use    Smoking status: Never    Smokeless tobacco: Never   Vaping Use    Vaping status: Never Used   Substance and Sexual Activity    Alcohol use: Yes     Comment: rare    Drug use: No    Sexual activity: Not Currently     Birth control/protection: None       Peripheral Neurovascular  Peripheral Neurovascular (Adult)  Peripheral Neurovascular WDL: WDL    Neuro Cognitive  Neuro Cognitive (Adult)  Cognitive/Neuro/Behavioral WDL: WDL    Learning  Learning Assessment (Adult)  Learning Readiness and Ability: no barriers identified  Education Provided  Person Taught: patient  Teaching Method: verbal instruction    Respiratory  Respiratory (Adult)  Airway WDL: WDL  Respiratory WDL  Respiratory WDL: .WDL except, cough  Cough Frequency: frequent  Cough Type: dry, good    Abdominal Pain       Pain Assessments  Pain (Adult)  (0-10) Pain Rating: Rest: 0    NIH Stroke Scale       Scott Castillo RN  07/13/24 17:59 EDT

## 2024-07-14 ENCOUNTER — APPOINTMENT (OUTPATIENT)
Dept: MRI IMAGING | Facility: HOSPITAL | Age: 74
End: 2024-07-14
Payer: MEDICARE

## 2024-07-14 ENCOUNTER — APPOINTMENT (OUTPATIENT)
Dept: CARDIOLOGY | Facility: HOSPITAL | Age: 74
End: 2024-07-14
Payer: MEDICARE

## 2024-07-14 LAB
ANION GAP SERPL CALCULATED.3IONS-SCNC: 8 MMOL/L (ref 5–15)
AORTIC DIMENSIONLESS INDEX: 0.9 (DI)
ASCENDING AORTA: 3.8 CM
BH CV ECHO MEAS - AO MAX PG: 7.5 MMHG
BH CV ECHO MEAS - AO MEAN PG: 3.4 MMHG
BH CV ECHO MEAS - AO V2 MAX: 137.3 CM/SEC
BH CV ECHO MEAS - AO V2 VTI: 26.9 CM
BH CV ECHO MEAS - AVA(I,D): 2.7 CM2
BH CV ECHO MEAS - EDV(CUBED): 85.3 ML
BH CV ECHO MEAS - EDV(MOD-SP2): 82 ML
BH CV ECHO MEAS - EDV(MOD-SP4): 70 ML
BH CV ECHO MEAS - EF(MOD-BP): 63.6 %
BH CV ECHO MEAS - EF(MOD-SP2): 64.6 %
BH CV ECHO MEAS - EF(MOD-SP4): 61.4 %
BH CV ECHO MEAS - ESV(CUBED): 46.6 ML
BH CV ECHO MEAS - ESV(MOD-SP2): 29 ML
BH CV ECHO MEAS - ESV(MOD-SP4): 27 ML
BH CV ECHO MEAS - FS: 18.2 %
BH CV ECHO MEAS - IVS/LVPW: 0.89 CM
BH CV ECHO MEAS - IVSD: 0.66 CM
BH CV ECHO MEAS - LAT PEAK E' VEL: 10.3 CM/SEC
BH CV ECHO MEAS - LV DIASTOLIC VOL/BSA (35-75): 37.9 CM2
BH CV ECHO MEAS - LV MASS(C)D: 93 GRAMS
BH CV ECHO MEAS - LV MAX PG: 5 MMHG
BH CV ECHO MEAS - LV MEAN PG: 2.27 MMHG
BH CV ECHO MEAS - LV SYSTOLIC VOL/BSA (12-30): 14.6 CM2
BH CV ECHO MEAS - LV V1 MAX: 112 CM/SEC
BH CV ECHO MEAS - LV V1 VTI: 24.2 CM
BH CV ECHO MEAS - LVIDD: 4.4 CM
BH CV ECHO MEAS - LVIDS: 3.6 CM
BH CV ECHO MEAS - LVOT AREA: 3 CM2
BH CV ECHO MEAS - LVOT DIAM: 1.96 CM
BH CV ECHO MEAS - LVPWD: 0.75 CM
BH CV ECHO MEAS - MED PEAK E' VEL: 10.9 CM/SEC
BH CV ECHO MEAS - MV A DUR: 0.19 SEC
BH CV ECHO MEAS - MV A MAX VEL: 81.4 CM/SEC
BH CV ECHO MEAS - MV DEC SLOPE: 531 CM/SEC2
BH CV ECHO MEAS - MV DEC TIME: 0.15 SEC
BH CV ECHO MEAS - MV E MAX VEL: 98.5 CM/SEC
BH CV ECHO MEAS - MV E/A: 1.21
BH CV ECHO MEAS - MV MAX PG: 4 MMHG
BH CV ECHO MEAS - MV MEAN PG: 1.42 MMHG
BH CV ECHO MEAS - MV P1/2T: 56.6 MSEC
BH CV ECHO MEAS - MV V2 VTI: 26.5 CM
BH CV ECHO MEAS - MVA(P1/2T): 3.9 CM2
BH CV ECHO MEAS - MVA(VTI): 2.8 CM2
BH CV ECHO MEAS - PA ACC TIME: 0.17 SEC
BH CV ECHO MEAS - PA V2 MAX: 58.1 CM/SEC
BH CV ECHO MEAS - PULM A REVS DUR: 0.16 SEC
BH CV ECHO MEAS - PULM A REVS VEL: 32.1 CM/SEC
BH CV ECHO MEAS - PULM DIAS VEL: 34.3 CM/SEC
BH CV ECHO MEAS - PULM S/D: 1.25
BH CV ECHO MEAS - PULM SYS VEL: 42.8 CM/SEC
BH CV ECHO MEAS - QP/QS: 0.57
BH CV ECHO MEAS - RAP SYSTOLE: 3 MMHG
BH CV ECHO MEAS - RV MAX PG: 0.81 MMHG
BH CV ECHO MEAS - RV V1 MAX: 45 CM/SEC
BH CV ECHO MEAS - RV V1 VTI: 10.9 CM
BH CV ECHO MEAS - RVOT DIAM: 2.21 CM
BH CV ECHO MEAS - RVSP: 12.2 MMHG
BH CV ECHO MEAS - SV(LVOT): 73.1 ML
BH CV ECHO MEAS - SV(MOD-SP2): 53 ML
BH CV ECHO MEAS - SV(MOD-SP4): 43 ML
BH CV ECHO MEAS - SV(RVOT): 41.6 ML
BH CV ECHO MEAS - SVI(LVOT): 39.6 ML/M2
BH CV ECHO MEAS - SVI(MOD-SP2): 28.7 ML/M2
BH CV ECHO MEAS - SVI(MOD-SP4): 23.3 ML/M2
BH CV ECHO MEAS - TAPSE (>1.6): 1.8 CM
BH CV ECHO MEAS - TR MAX PG: 9.2 MMHG
BH CV ECHO MEAS - TR MAX VEL: 152 CM/SEC
BH CV ECHO MEASUREMENTS AVERAGE E/E' RATIO: 9.29
BH CV XLRA - RV BASE: 3.4 CM
BH CV XLRA - RV LENGTH: 5.4 CM
BH CV XLRA - RV MID: 2.27 CM
BH CV XLRA - TDI S': 9.9 CM/SEC
BUN SERPL-MCNC: 14 MG/DL (ref 8–23)
BUN/CREAT SERPL: 16.1 (ref 7–25)
CALCIUM SPEC-SCNC: 8.5 MG/DL (ref 8.6–10.5)
CHLORIDE SERPL-SCNC: 112 MMOL/L (ref 98–107)
CO2 SERPL-SCNC: 25 MMOL/L (ref 22–29)
CREAT SERPL-MCNC: 0.87 MG/DL (ref 0.57–1)
DEPRECATED RDW RBC AUTO: 42.8 FL (ref 37–54)
EGFRCR SERPLBLD CKD-EPI 2021: 70.9 ML/MIN/1.73
ERYTHROCYTE [DISTWIDTH] IN BLOOD BY AUTOMATED COUNT: 12.8 % (ref 12.3–15.4)
GEN 5 2HR TROPONIN T REFLEX: 6 NG/L
GLUCOSE SERPL-MCNC: 128 MG/DL (ref 65–99)
HCT VFR BLD AUTO: 35 % (ref 34–46.6)
HGB BLD-MCNC: 11.4 G/DL (ref 12–15.9)
LEFT ATRIUM VOLUME INDEX: 25.2 ML/M2
LEFT ATRIUM VOLUME: 47 ML
MCH RBC QN AUTO: 29.8 PG (ref 26.6–33)
MCHC RBC AUTO-ENTMCNC: 32.6 G/DL (ref 31.5–35.7)
MCV RBC AUTO: 91.6 FL (ref 79–97)
PLATELET # BLD AUTO: 153 10*3/MM3 (ref 140–450)
PMV BLD AUTO: 10.5 FL (ref 6–12)
POTASSIUM SERPL-SCNC: 3.9 MMOL/L (ref 3.5–5.2)
QT INTERVAL: 419 MS
QTC INTERVAL: 429 MS
RBC # BLD AUTO: 3.82 10*6/MM3 (ref 3.77–5.28)
SINUS: 3.6 CM
SODIUM SERPL-SCNC: 145 MMOL/L (ref 136–145)
STJ: 3.1 CM
TROPONIN T DELTA: -1 NG/L
TROPONIN T SERPL HS-MCNC: 7 NG/L
WBC NRBC COR # BLD AUTO: 6.16 10*3/MM3 (ref 3.4–10.8)

## 2024-07-14 PROCEDURE — 93306 TTE W/DOPPLER COMPLETE: CPT | Performed by: INTERNAL MEDICINE

## 2024-07-14 PROCEDURE — 99222 1ST HOSP IP/OBS MODERATE 55: CPT | Performed by: INTERNAL MEDICINE

## 2024-07-14 PROCEDURE — 97162 PT EVAL MOD COMPLEX 30 MIN: CPT

## 2024-07-14 PROCEDURE — 0 GADOBENATE DIMEGLUMINE 529 MG/ML SOLUTION: Performed by: INTERNAL MEDICINE

## 2024-07-14 PROCEDURE — 84484 ASSAY OF TROPONIN QUANT: CPT | Performed by: INTERNAL MEDICINE

## 2024-07-14 PROCEDURE — 85027 COMPLETE CBC AUTOMATED: CPT | Performed by: INTERNAL MEDICINE

## 2024-07-14 PROCEDURE — 36415 COLL VENOUS BLD VENIPUNCTURE: CPT | Performed by: INTERNAL MEDICINE

## 2024-07-14 PROCEDURE — 80048 BASIC METABOLIC PNL TOTAL CA: CPT | Performed by: INTERNAL MEDICINE

## 2024-07-14 PROCEDURE — 97530 THERAPEUTIC ACTIVITIES: CPT

## 2024-07-14 PROCEDURE — 93306 TTE W/DOPPLER COMPLETE: CPT

## 2024-07-14 PROCEDURE — 25810000003 SODIUM CHLORIDE 0.9 % SOLUTION: Performed by: INTERNAL MEDICINE

## 2024-07-14 PROCEDURE — A9577 INJ MULTIHANCE: HCPCS | Performed by: INTERNAL MEDICINE

## 2024-07-14 PROCEDURE — 70553 MRI BRAIN STEM W/O & W/DYE: CPT

## 2024-07-14 RX ORDER — BENZONATATE 100 MG/1
200 CAPSULE ORAL ONCE AS NEEDED
Status: COMPLETED | OUTPATIENT
Start: 2024-07-14 | End: 2024-07-14

## 2024-07-14 RX ORDER — LOSARTAN POTASSIUM 25 MG/1
25 TABLET ORAL NIGHTLY
Status: DISCONTINUED | OUTPATIENT
Start: 2024-07-14 | End: 2024-07-15 | Stop reason: HOSPADM

## 2024-07-14 RX ADMIN — BENZONATATE 200 MG: 100 CAPSULE ORAL at 03:45

## 2024-07-14 RX ADMIN — SODIUM CHLORIDE 75 ML/HR: 9 INJECTION, SOLUTION INTRAVENOUS at 13:59

## 2024-07-14 RX ADMIN — ACETAMINOPHEN 650 MG: 325 TABLET ORAL at 06:51

## 2024-07-14 RX ADMIN — LOSARTAN POTASSIUM 25 MG: 25 TABLET, FILM COATED ORAL at 08:04

## 2024-07-14 RX ADMIN — GADOBENATE DIMEGLUMINE 15 ML: 529 INJECTION, SOLUTION INTRAVENOUS at 18:51

## 2024-07-14 NOTE — H&P
HISTORY AND PHYSICAL   [facility]        Date of Admission: 2024  Patient Identification:  Name: Irlanda Henry  Age: 73 y.o.  Sex: female  :  1950  MRN: [id number]                     Primary Care Physician: Cris Rene APRN    Chief Complaint:  73 year old female presented to the ED after she had a near syncopal episode earlier today; she has had nausea, vomiting and diarrhea; she has had prior vasovagal episodes which felt similar; denies fever or chills    History of Present Illness:   As above    Past Medical History:  Past Medical History:   Diagnosis Date    Cataract     Encounter for diagnostic colonoscopy due to change in bowel habits 2023    Polio     Vasovagal syncope      Past Surgical History:  Past Surgical History:   Procedure Laterality Date    CARDIAC CATHETERIZATION N/A 2017    Procedure: Left Heart Cath;  Surgeon: Jose Murphy MD;  [facility];  Service:     CARDIAC CATHETERIZATION N/A 2017    Procedure: Coronary angiography;  Surgeon: Jose Murphy MD;  Location:  [facility] CATH INVASIVE LOCATION;  Service:     CARPAL TUNNEL RELEASE      CATARACT EXTRACTION      CHOLECYSTECTOMY      COLONOSCOPY      COLONOSCOPY N/A 2023    Procedure: COLONOSCOPY to cecum;  Surgeon: Patricio Ulloa MD;  Location: [facility] ENDOSCOPY;  Service: Gastroenterology;  Laterality: N/A;  PRE - abd pain  POST - diverticulosis    LEG SURGERY      multiple surgeries for polio    RETINAL DETACHMENT REPAIR Left       Home Meds:  Medications Prior to Admission   Medication Sig Dispense Refill Last Dose    olmesartan (BENICAR) 20 MG tablet TAKE 1 TABLET BY MOUTH DAILY 30 tablet 3 2024       Allergies:  Allergies   Allergen Reactions    Ct Contrast Nausea And Vomiting     Immunizations:  Immunization History   Administered Date(s) Administered    COVID-19 (PFIZER) Purple Cap Monovalent 2021, 2021, 2021     Social  "History:   Social History     Social History Narrative    Not on file     Social History     Socioeconomic History    Marital status: Legally    Tobacco Use    Smoking status: Never    Smokeless tobacco: Never   Vaping Use    Vaping status: Never Used   Substance and Sexual Activity    Alcohol use: Yes     Comment: rare    Drug use: No    Sexual activity: Not Currently     Birth control/protection: None       Family History:  Family History   Problem Relation Age of Onset    Breast cancer Maternal Aunt     Heart attack Mother         in 90s     Kidney failure Father         Review of Systems  See history of present illness and past medical history.  Patient denies headache, dizziness, syncope, falls, trauma, change in vision, change in hearing, change in taste, changes in weight, changes in appetite, focal weakness, numbness, or paresthesia.  Patient denies chest pain, palpitations, dyspnea, orthopnea, PND, cough, sinus pressure, rhinorrhea, epistaxis, hemoptysis,  hematemesis,  constipation or hematochezia.  Denies cold or heat intolerance, polydipsia, polyuria, polyphagia. Denies hematuria, pyuria, dysuria, hesitancy, frequency or urgency. Denies consumption of raw and under cooked meats foods or change in water source.       Objective:  T Max 24 hrs: Temp (24hrs), Av.1 °F (36.2 °C), Min:96.3 °F (35.7 °C), Max:98.2 °F (36.8 °C)    Vitals Ranges:   Temp:  [96.3 °F (35.7 °C)-98.2 °F (36.8 °C)] 98.2 °F (36.8 °C)  Heart Rate:  [57-78] 72  Resp:  [16] 16  BP: (110-141)/(50-80) 131/64      Exam:  /64 (BP Location: Right arm, Patient Position: Lying)   Pulse 72   Temp 98.2 °F (36.8 °C) (Oral)   Resp 16   Ht 165.1 cm (65\")   Wt 78.9 kg (174 lb)   SpO2 96%   BMI 28.96 kg/m²     General Appearance:    Alert, cooperative, no distress, appears stated age   Head:    Normocephalic, without obvious abnormality, atraumatic   Eyes:    PERRL, conjunctivae/corneas clear, EOM's intact, both eyes   Ears:    " Normal external ear canals, both ears   Nose:   Nares normal, septum midline, mucosa normal, no drainage    or sinus tenderness   Throat:   Lips, mucosa, and tongue normal   Neck:   Supple, symmetrical, trachea midline, no adenopathy;     thyroid:  no enlargement/tenderness/nodules; no carotid    bruit or JVD   Back:     Symmetric, no curvature, ROM normal, no CVA tenderness   Lungs:     Clear to auscultation bilaterally, respirations unlabored   Chest Wall:    No tenderness or deformity    Heart:    Regular rate and rhythm, S1 and S2 normal, no murmur, rub   or gallop   Abdomen:     Soft, nontender, bowel sounds active all four quadrants,     no masses, no hepatomegaly, no splenomegaly   Extremities:   Extremities normal, atraumatic, no cyanosis or edema   Pulses:   2+ and symmetric all extremities   Skin:   Skin color, texture, turgor normal, no rashes or lesions   Lymph nodes:   Cervical, supraclavicular, and axillary nodes normal   Neurologic:   CNII-XII intact, normal strength, sensation intact throughout      .    Data Review:  Labs in chart were reviewed.  WBC   Date Value Ref Range Status   07/13/2024 6.19 3.40 - 10.80 10*3/mm3 Final     Hemoglobin   Date Value Ref Range Status   07/13/2024 12.6 12.0 - 15.9 g/dL Final     Hematocrit   Date Value Ref Range Status   07/13/2024 38.7 34.0 - 46.6 % Final     Platelets   Date Value Ref Range Status   07/13/2024 141 140 - 450 10*3/mm3 Final     Sodium   Date Value Ref Range Status   07/13/2024 142 136 - 145 mmol/L Final     Potassium   Date Value Ref Range Status   07/13/2024 4.4 3.5 - 5.2 mmol/L Final     Chloride   Date Value Ref Range Status   07/13/2024 112 (H) 98 - 107 mmol/L Final     CO2   Date Value Ref Range Status   07/13/2024 22.0 22.0 - 29.0 mmol/L Final     BUN   Date Value Ref Range Status   07/13/2024 13 8 - 23 mg/dL Final     Creatinine   Date Value Ref Range Status   07/13/2024 0.70 0.57 - 1.00 mg/dL Final     Glucose   Date Value Ref Range Status    07/13/2024 113 (H) 65 - 99 mg/dL Final     Calcium   Date Value Ref Range Status   07/13/2024 8.4 (L) 8.6 - 10.5 mg/dL Final     Magnesium   Date Value Ref Range Status   07/13/2024 2.1 1.6 - 2.4 mg/dL Final                Imaging Results (All)       Procedure Component Value Units Date/Time    XR Chest 1 View [[id number]] Collected: 07/13/24 1608     Updated: 07/13/24 1625    Narrative:      CHEST SINGLE VIEW     HISTORY: Cough. Dyspnea.     COMPARISON: CT chest 10/26/2023, two-view chest 08/31/2017.     FINDINGS: Patient is rotated to the right and, allowing for this, the  cardiomediastinal silhouette has not changed. The thoracic aorta appears  tortuous. There is mild elevation of left hemidiaphragm. Lungs appear  clear and there is no evidence for pleural effusion or pneumothorax.       Impression:      No evidence for active disease in the chest.     This report was finalized on 7/13/2024 4:22 PM by Dr. Trenton Kitchen M.D on Workstation: [id number]                 Assessment:  Active Hospital Problems    Diagnosis  POA    **Near syncope [R55]  Yes      Resolved Hospital Problems   No resolved problems to display.   Nausea and vomiting  Diarrhea  Hyperglycemia  hypertension    Plan:  Echo  Ask cardiology to see her  Monitor on telemetry  Trend labs  Dw patient and ed provider    Kena Bright MD  7/13/2024  22:23 EDT

## 2024-07-14 NOTE — PROGRESS NOTES
Name: Irlanda Henry ADMIT: 2024   : 1950  PCP: Cris Rene APRN    MRN: [id number] LOS: 0 days   AGE/SEX: 73 y.o. female  ROOM: [facility]   Subjective   Chief Complaint   Patient presents with    Vomiting    Diarrhea    Near Syncope     72 yo with history of HTN, polio with right leg weakness, history of recurrent issues with N/V as well as recurrent issues with vasovagal symptoms. Yesterday she had been having nausea, vomitting, diarrhea and then had episode of diaphoresis, light-headedness, weakness.     ROS  No f/c  + n/v  No cp/palp  No soa/cough    Objective   Vital Signs  Temp:  [96.8 °F (36 °C)-99 °F (37.2 °C)] 98.4 °F (36.9 °C)  Heart Rate:  [61-78] 61  Resp:  [16-17] 16  BP: (110-141)/(50-74) 122/65  SpO2:  [94 %-99 %] 94 %  on   ;   Device (Oxygen Therapy): room air  Body mass index is 28.25 kg/m².    Physical Exam  Constitutional:       General: She is not in acute distress.  HENT:      Head: Normocephalic and atraumatic.   Eyes:      General: No scleral icterus.  Cardiovascular:      Rate and Rhythm: Regular rhythm.      Heart sounds: Normal heart sounds.   Pulmonary:      Effort: Pulmonary effort is normal. No respiratory distress.   Abdominal:      General: There is no distension.      Palpations: Abdomen is soft.      Tenderness: There is no abdominal tenderness. There is no guarding.   Musculoskeletal:      Cervical back: Neck supple.   Neurological:      Mental Status: She is alert.   Psychiatric:         Mood and Affect: Mood normal.         Behavior: Behavior normal.         Thought Content: Thought content normal.         Results Review:       I reviewed the patient's new clinical results.  Results from last 7 days   Lab Units 24  0310 24  1417   WBC 10*3/mm3 6.16 6.19   HEMOGLOBIN g/dL 11.4* 12.6   PLATELETS 10*3/mm3 153 141     Results from last 7 days   Lab Units 24  0310 24  1417   SODIUM mmol/L 145 142   POTASSIUM mmol/L 3.9 4.4   CHLORIDE mmol/L  "112* 112*   CO2 mmol/L 25.0 22.0   BUN mg/dL 14 13   CREATININE mg/dL 0.87 0.70   GLUCOSE mg/dL 128* 113*   Estimated Creatinine Clearance: 60 mL/min (by C-G formula based on SCr of 0.87 mg/dL).  Results from last 7 days   Lab Units 07/13/24  1417   ALBUMIN g/dL 3.6   BILIRUBIN mg/dL 0.4   ALK PHOS U/L 41   AST (SGOT) U/L 18   ALT (SGPT) U/L 10     Results from last 7 days   Lab Units 07/14/24  0310 07/13/24  1417   CALCIUM mg/dL 8.5* 8.4*   ALBUMIN g/dL  --  3.6   MAGNESIUM mg/dL  --  2.1     Results from last 7 days   Lab Units 07/13/24  1417   PROCALCITONIN ng/mL 0.05   LACTATE mmol/L 0.9       Coag     HbA1C   Lab Results   Component Value Date    HGBA1C 6.2 (H) 06/21/2024     Infection   Results from last 7 days   Lab Units 07/13/24  1417   PROCALCITONIN ng/mL 0.05     Radiology(recent) XR Chest 1 View    Result Date: 7/13/2024  No evidence for active disease in the chest.  This report was finalized on 7/13/2024 4:22 PM by Dr. 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     72 yo with history of HTN, polio with right leg weakness, history of recurrent issues with N/V as well as recurrent issues with vasovagal symptoms. Yesterday she had been having nausea, vomitting, diarrhea and then had episode of diaphoresis, light-headedness, weakness.     Etiology of symptoms is " unclear. Agree with Dr. Rosado maybe some dysautonomia related to her history of polio. No current abdominal pain or nausea. Plan for 2D ECHO, telemetry monitoring. Will obtain MRI brain. No current abd pain or GI symptoms but if recurrence consider repeat abdominal imaging. Continue fluids, monitor labs. She's had previous cholecystectomy. Will have on BID PPI.       PEDRO PABLO Baker MD  [facility]  07/14/24  10:32 EDT

## 2024-07-14 NOTE — PLAN OF CARE
Goal Outcome Evaluation:  Plan of Care Reviewed With: patient        Progress: improving  Outcome Evaluation: Pt evaluated by PT this PM, admitted due to near syncopal episode. Prior to admission, pt was living at home alone, 5 ORI home with rail on left side ascending. Significant to current function is polio in RLE, does not have use of it and has to wear a brace with a shoe applied to be able to stand or ambulate. In her home she can ambulate up to 60 feet using RW and brace and goes up and down steps using rail and wall. Today, she does not have her R shoe, so unable to assess STS, standing balance, or gait. She needs SBA for bed mobility and sitting balance and requires CGA to transfer to bedside commode using squat pivot.  Pt prefers home at discharge, but verbalizes understanding that she will need to have a friend/family member bring a shoe to make sure she can walk and safely navigate stairs to enter her home for that to be the final discharge plan.  If unable to navigate stairs once she has brace and shoe, would need SNF.      Anticipated Discharge Disposition (PT): home, skilled nursing facility

## 2024-07-14 NOTE — THERAPY EVALUATION
Patient Name: Irlanda Henry  : 1950    MRN: [id number]                              Today's Date: 2024       Admit Date: 2024    Visit Dx:     ICD-10-CM ICD-9-CM   1. Vasovagal episode  R55 780.2   2. Near syncope  R55 780.2   3. Nausea and vomiting, unspecified vomiting type  R11.2 787.01     Patient Active Problem List   Diagnosis    Chest pain    Post-polio limb muscle weakness    Gastroesophageal reflux disease with esophagitis    Allergic arthritis of right hip    Gout of hand    Chronic fatigue    Avitaminosis D    Nodular goiter, non-toxic    Thyroiditis    Polio    Lower abdominal pain    Immobility    Annual physical exam    Vulvar itching    Abnormal CT of the abdomen    Near syncope    Hypertension    History of solitary pulmonary nodule    History of vitamin D deficiency    Family history of diabetes mellitus     Past Medical History:   Diagnosis Date    Cataract     Encounter for diagnostic colonoscopy due to change in bowel habits 2023    Polio     Vasovagal syncope      Past Surgical History:   Procedure Laterality Date    CARDIAC CATHETERIZATION N/A 2017    Procedure: Left Heart Cath;  Surgeon: Jose Murphy MD;  Location:  [facility];  Service:     CARDIAC CATHETERIZATION N/A 2017    Procedure: Coronary angiography;  Surgeon: Jose Murphy MD;  Location:  [facility];  Service:     CARPAL TUNNEL RELEASE      CATARACT EXTRACTION      CHOLECYSTECTOMY      COLONOSCOPY      COLONOSCOPY N/A 2023    Procedure: COLONOSCOPY to cecum;  Surgeon: Patricio Ulloa MD;  [facility];  Service: Gastroenterology;  Laterality: N/A;  PRE - abd pain  POST - diverticulosis    LEG SURGERY      multiple surgeries for polio    RETINAL DETACHMENT REPAIR Left       General Information       Row Name 24 1447          Physical Therapy Time and Intention    Document Type evaluation  -KA     Mode of Treatment individual  therapy;physical therapy  -       Row Name 07/14/24 1447          General Information    Patient Profile Reviewed yes  -     Prior Level of Function independent:;ADL's;gait  Polio RLE, has to wear brace and use RW, short distances only  -     Existing Precautions/Restrictions fall;other (see comments)  Brace RLE to ambulate due to polio  -     Barriers to Rehab previous functional deficit  -       Row Name 07/14/24 1447          Living Environment    People in Home alone  -[facility] Name 07/14/24 1447          Home Main Entrance    Number of Stairs, Main Entrance five  -KA     Stair Railings, Main Entrance railing on left side (ascending);other (see comments)  has wall on Right side that she also uses for support  -[facility] Name 07/14/24 1447          Stairs Within Home, Primary    Number of Stairs, Within Home, Primary none  -[facility] Name 07/14/24 1447          Cognition    Orientation Status (Cognition) oriented x 4  -[facility] Name 07/14/24 1447          Safety Issues, Functional Mobility    Impairments Affecting Function (Mobility) strength;balance;endurance/activity tolerance  -     Comment, Safety Issues/Impairments (Mobility) Non-skid sock LLE; could not ambulate because she does not have a shoe to put on RLE polio brace (slick on bottom)  -               User Key  (r) = Recorded By, (t) = Taken By, (c) = Cosigned By      Initials Name Provider Type    Mary Man, PT Physical Therapist                   Mobility       Row Name 07/14/24 1450          Bed Mobility    Bed Mobility supine-sit;sit-supine  -     Supine-Sit Junction (Bed Mobility) standby assist  -     Sit-Supine Junction (Bed Mobility) standby assist  -     Assistive Device (Bed Mobility) bed rails;head of bed elevated  -       Row Name 07/14/24 1450          Bed-Chair Transfer    Bed-Chair Junction (Transfers) contact guard;1 person assist  -     Comment, (Bed-Chair Transfer) Squat pivot  transfer  -       Row Name 07/14/24 1450          Sit-Stand Transfer    Sit-Stand Wilkes (Transfers) unable to assess  -     Comment, (Sit-Stand Transfer) Needs brace on RLE and R shoe to be able to attempt safely  -       Row Name 07/14/24 1450          Gait/Stairs (Locomotion)    Wilkes Level (Gait) unable to assess  -KA     Wilkes Level (Stairs) unable to assess  -     Comment, (Gait/Stairs) Needs brace on RLE and R shoe to attempt gait and stairs  -               User Key  (r) = Recorded By, (t) = Taken By, (c) = Cosigned By      Initials Name Provider Type    Mary Man, PT Physical Therapist                   Obj/Interventions       Row Name 07/14/24 1452          Range of Motion Comprehensive    General Range of Motion lower extremity range of motion deficits identified  -     Comment, General Range of Motion RLE has no AROM, limited with dorsiflexion bilaterally  -[facility] Name 07/14/24 1452          Strength Comprehensive (MMT)    Comment, General Manual Muscle Testing (MMT) Assessment Unable to move RLE due to polio; LLE strength at least 3/5  -[facility] Name 07/14/24 1452          Balance    Balance Assessment sitting static balance;sitting dynamic balance  -KA     Static Sitting Balance independent  -KA     Dynamic Sitting Balance independent  -KA     Position, Sitting Balance supported;sitting in chair;sitting edge of bed  -               User Key  (r) = Recorded By, (t) = Taken By, (c) = Cosigned By      Initials Name Provider Type    Mary Man, PT Physical Therapist                   Goals/Plan       Row Name 07/14/24 1501          Bed Mobility Goal 1 (PT)    Activity/Assistive Device (Bed Mobility Goal 1, PT) bed mobility activities, all  -KA     Wilkes Level/Cues Needed (Bed Mobility Goal 1, PT) independent  -KA     Time Frame (Bed Mobility Goal 1, PT) 1 week  -       Row Name 07/14/24 1501          Transfer Goal 1 (PT)     Activity/Assistive Device (Transfer Goal 1, PT) sit-to-stand/stand-to-sit;bed-to-chair/chair-to-bed  -KA     Ross Level/Cues Needed (Transfer Goal 1, PT) standby assist  -KA     Time Frame (Transfer Goal 1, PT) 1 week  -KA       [facility] Name 07/14/24 1501          Gait Training Goal 1 (PT)    Activity/Assistive Device (Gait Training Goal 1, PT) gait (walking locomotion);assistive device use;decrease fall risk;improve balance and speed;increase endurance/gait distance;walker, rolling  -KA     Ross Level (Gait Training Goal 1, PT) standby assist  -KA     Distance (Gait Training Goal 1, PT) 60 ft  -KA     Time Frame (Gait Training Goal 1, PT) 1 week  -[facility] Name 07/14/24 1501          Stairs Goal 1 (PT)    Activity/Assistive Device (Stairs Goal 1, PT) ascending stairs;descending stairs;using handrail, left  -KA     Ross Level/Cues Needed (Stairs Goal 1, PT) contact guard required  -KA     Number of Stairs (Stairs Goal 1, PT) 5  -KA     Time Frame (Stairs Goal 1, PT) 1 week  -[facility] Name 07/14/24 1501          Therapy Assessment/Plan (PT)    Planned Therapy Interventions (PT) balance training;bed mobility training;gait training;home exercise program;manual therapy techniques;neuromuscular re-education;patient/family education;postural re-education;stair training;transfer training;strengthening  -KA               User Key  (r) = Recorded By, (t) = Taken By, (c) = Cosigned By      Initials Name Provider Type    Mary Man, PT Physical Therapist                   Clinical Impression       Row Name 07/14/24 1454          Pain    Pretreatment Pain Rating 0/10 - no pain  -KA     Posttreatment Pain Rating 0/10 - no pain  -KA     Pain Intervention(s) Repositioned  -       Row Name 07/14/24 1457 07/14/24 1454       Plan of Care Review    Plan of Care Reviewed With -- patient  -KA    Progress -- improving  -KA    Outcome Evaluation Pt evaluated by PT this PM, admitted due to near  syncopal episode. Prior to admission, pt was living at home alone, 5 ORI home with rail on left side ascending. Significant to current function is polio in RLE, does not have use of it and has to wear a brace with a shoe applied to be able to stand or ambulate. In her home she can ambulate up to 60 feet using RW and brace and goes up and down steps using rail and wall. Today, she does not have her R shoe, so unable to assess STS, standing balance, or gait. She needs SBA for bed mobility and sitting balance and requires CGA to transfer to bedside commode using squat pivot.  Pt prefers home at discharge, but verbalizes understanding that she will need to have a friend/family member bring a shoe to make sure she can walk and safely navigate stairs to enter her home for that to be the final discharge plan.  If unable to navigate stairs once she has brace and shoe, would need SNF.  - --      Row Name 07/14/24 1457          Therapy Assessment/Plan (PT)    Rehab Potential (PT) good, to achieve stated therapy goals  -     Criteria for Skilled Interventions Met (PT) yes;meets criteria  -XIOMARA     Therapy Frequency (PT) 5 times/wk  -XIOMARA     Predicted Duration of Therapy Intervention (PT) 1 week  -XIOMARA       Row Name 07/14/24 1457          Vital Signs    O2 Delivery Pre Treatment room air  -KA     O2 Delivery Intra Treatment room air  -KA     O2 Delivery Post Treatment room air  -KA     Pre Patient Position Supine  -KA     Intra Patient Position Sitting  -     Post Patient Position Supine  -       Row Name 07/14/24 1457          Positioning and Restraints    Pre-Treatment Position in bed  -KA     Post Treatment Position bed  -KA     In Bed notified nsg;supine;call light within reach;encouraged to call for assist;exit alarm on  -               User Key  (r) = Recorded By, (t) = Taken By, (c) = Cosigned By      Initials Name Provider Type    Mary Man, PT Physical Therapist                   Outcome Measures        Row Name 07/14/24 1504 07/14/24 0800       How much help from another person do you currently need...    Turning from your back to your side while in flat bed without using bedrails? 4  -KA 4  -JJ    Moving from lying on back to sitting on the side of a flat bed without bedrails? 4  -KA 4  -JJ    Moving to and from a bed to a chair (including a wheelchair)? 3  -KA 3  -JJ    Standing up from a chair using your arms (e.g., wheelchair, bedside chair)? 3  -KA 3  -JJ    Climbing 3-5 steps with a railing? 2  -KA 2  -JJ    To walk in hospital room? 2  -KA 2  -JJ    AM-PAC 6 Clicks Score (PT) 18  -XIOMARA 18  -JJ    Highest Level of Mobility Goal 6 --> Walk 10 steps or more  -KA 6 --> Walk 10 steps or more  -JJ      Row Name 07/14/24 1504          Functional Assessment    Outcome Measure Options AM-PAC 6 Clicks Basic Mobility (PT)  -XIOMARA               User Key  (r) = Recorded By, (t) = Taken By, (c) = Cosigned By      Initials Name Provider Type    Mary Man, PT Physical Therapist    Chayito Guerra RN Registered Nurse                                 Physical Therapy Education       Title: PT OT SLP Therapies (Done)       Topic: Physical Therapy (Done)       Point: Mobility training (Done)       Learning Progress Summary             Patient Acceptance, E, VU by XIOMARA at 7/14/2024 1505                                         User Key       Initials Effective Dates Name Provider Type Discipline     08/24/21 -  Mary Sosa, PT Physical Therapist PT                  PT Recommendation and Plan  Planned Therapy Interventions (PT): balance training, bed mobility training, gait training, home exercise program, manual therapy techniques, neuromuscular re-education, patient/family education, postural re-education, stair training, transfer training, strengthening  Plan of Care Reviewed With: patient  Progress: improving  Outcome Evaluation: Pt evaluated by PT this PM, admitted due to near syncopal episode. Prior to  admission, pt was living at home alone, 5 ORI home with rail on left side ascending. Significant to current function is polio in RLE, does not have use of it and has to wear a brace with a shoe applied to be able to stand or ambulate. In her home she can ambulate up to 60 feet using RW and brace and goes up and down steps using rail and wall. Today, she does not have her R shoe, so unable to assess STS, standing balance, or gait. She needs SBA for bed mobility and sitting balance and requires CGA to transfer to bedside commode using squat pivot.  Pt prefers home at discharge, but verbalizes understanding that she will need to have a friend/family member bring a shoe to make sure she can walk and safely navigate stairs to enter her home for that to be the final discharge plan.  If unable to navigate stairs once she has brace and shoe, would need SNF.     Time Calculation:         PT Charges       Row Name 07/14/24 1512             Time Calculation    Start Time 1404  -KA      Stop Time 1417  -KA      Time Calculation (min) 13 min  -KA      PT Received On 07/14/24  -KA      PT - Next Appointment 07/15/24  -KA      PT Goal Re-Cert Due Date 07/21/24  -KA         Time Calculation- PT    Total Timed Code Minutes- PT 8 minute(s)  -KA         Timed Charges    34998 - PT Therapeutic Activity Minutes 8  -KA         Total Minutes    Timed Charges Total Minutes 8  -KA       Total Minutes 8  -KA                User Key  (r) = Recorded By, (t) = Taken By, (c) = Cosigned By      Initials Name Provider Type    Mary Man PT Physical Therapist                  Therapy Charges for Today       Code Description Service Date Service Provider Modifiers Qty    [id number]  PT EVAL MOD COMPLEXITY 2 7/14/2024 Mary Sosa, PT GP 1    46427212004 HC PT THERAPEUTIC ACT EA 15 MIN 7/14/2024 Mary Sosa, PT GP 1    [id number]  PT THER SUPP EA 15 MIN 7/14/2024 Mary Sosa, PT GP 1            PT G-Codes  Outcome  Measure Options: AM-PAC 6 Clicks Basic Mobility (PT)  AM-PAC 6 Clicks Score (PT): 18  PT Discharge Summary  Anticipated Discharge Disposition (PT): home, skilled nursing facility    Mary Sosa, LENY  7/14/2024

## 2024-07-14 NOTE — PLAN OF CARE
Goal Outcome Evaluation:  Plan of Care Reviewed With: patient        Progress: no change  Outcome Evaluation: Patient was admitted for near syncopal episode. On assessment, patient denies being dizzy and nauseated, stated she still feels weak. Patient is alert and oriented x4, assistx1, on RA. VSS. IV NS 0.9% infusing at 75ml/hr. Awaiting Cardiology review in the morning. Promoted rest and sleep. Encouraged symptoms reporting. Plan of care ongoing.

## 2024-07-14 NOTE — CONSULTS
Date of Hospital Visit: 24  Encounter Provider: Mayte Rosado MD  Place of Service: [facility] CARDIOLOGY  Patient Name: Irlanda Henry  :1950  Referral Provider: Kena Bright, *    Chief complaint    Consult for syncope    History of Present Illness:    Irlanda Henry is a 73 y.o. female     She has a history of polio with right leg weakness and recurrent syncope.    She had a normal cardiac catheterization in 2017.  She had a CT chest with contrast on 10/2023 which showed no coronary artery calcification, patent coronary arteries.  She has CT head done 2023 which showed no acute intracranial abnormality.  She has CT abdomen done 10/26/2023 which showed ductal dilation and thyroid cyst, enlarging left adnexal cyst with recommendation to follow-up with PCP.    She was at home yesterday in her usual state of health.  She got up and took olmesartan which she uses for blood pressure.  Her granddaughter was there so she was testing a bagel for her.  She then decided to go the bathroom.  She came back out to put cream cheese on the bagel after going to the bathroom.  She then started feeling lightheaded and weak.  She made her way back to her bedroom and lay down to get her legs up.  When she feels like this, she knows that she could pass out.  She broke out into a sweat and sweating profusely.  She then became very nauseated and started vomiting and having watery diarrhea with incontinence.  She was able to make her way to the bathroom and put her head on the sink while she was sitting on the toilet and called out to her daughter was there.  They called EMS.  When EMS arrived, they told her that she was very pale.  She was profusely sweaty and she said she felt like she was going to die.  She did not pass out all the way but she was very near to that.    Her blood pressure on arrival was 116/80 with a heart rate of 57.  Her laboratory values show negative  troponin x 2, glucose 128, calcium 8.5, otherwise normal BMP and CBC.  Her chest x-ray showed no acute disease.  Her respiratory viral panel was negative.  Her ECG showed sinus rhythm, normal.  We been asked to see her for syncope.    Past Medical History:   Diagnosis Date    Cataract     Encounter for diagnostic colonoscopy due to change in bowel habits 06/21/2023    Polio     Vasovagal syncope        Past Surgical History:   Procedure Laterality Date    CARDIAC CATHETERIZATION N/A 09/02/2017    Procedure: Left Heart Cath;  Surgeon: Jose Murphy MD;  Location:  [facility] CATH INVASIVE LOC[facility];  Service:     CARDIAC CATHETERIZATION N/A 09/02/2017    Procedure: Coronary angiography;  Surgeon: Jose Murphy MD;  Location:  [facility] CATH INVASIVE LOCATION;  Service:     CARPAL TUNNEL RELEASE      CATARACT EXTRACTION      CHOLECYSTECTOMY      COLONOSCOPY      COLONOSCOPY N/A 6/22/2023    Procedure: COLONOSCOPY to cecum;  Surgeon: Patricio Ulloa MD;  Location: [facility] ENDOSCOPY;  Service: Gastroenterology;  Laterality: N/A;  PRE - abd pain  POST - diverticulosis    LEG SURGERY      multiple surgeries for polio    RETINAL DETACHMENT REPAIR Left        Medications Prior to Admission   Medication Sig Dispense Refill Last Dose    olmesartan (BENICAR) 20 MG tablet TAKE 1 TABLET BY MOUTH DAILY 30 tablet 3 7/13/2024       Current Meds  Scheduled Meds:losartan, 25 mg, Oral, Q24H      Continuous Infusions:sodium chloride, 75 mL/hr, Last Rate: 75 mL/hr (07/13/24 4694)      PRN Meds:.  acetaminophen **OR** acetaminophen **OR** acetaminophen    senna-docusate sodium **AND** polyethylene glycol **AND** bisacodyl **AND** bisacodyl    melatonin    ondansetron ODT **OR** ondansetron    Allergies as of 07/13/2024 - Reviewed 07/13/2024   Allergen Reaction Noted    Ct contrast Nausea And Vomiting 12/14/2023       Social History     Socioeconomic History    Marital status: Legally    Tobacco Use    Smoking status: Never     "Smokeless tobacco: Never   Vaping Use    Vaping status: Never Used   Substance and Sexual Activity    Alcohol use: Yes     Comment: rare    Drug use: No    Sexual activity: Not Currently     Birth control/protection: None       Family History   Problem Relation Age of Onset    Breast cancer Maternal Aunt     Heart attack Mother         in 90s     Kidney failure Father        REVIEW OF SYSTEMS:   12 point ROS was performed and is negative except as outlined in HPI       Objective:   Temp:  [96.3 °F (35.7 °C)-99 °F (37.2 °C)] 98.1 °F (36.7 °C)  Heart Rate:  [57-78] 75  Resp:  [16-17] 16  BP: (110-141)/(50-80) 116/74  Body mass index is 28.25 kg/m².  Flowsheet Rows      Flowsheet Row First Filed Value   Admission Height 165.1 cm (65\") Documented at 07/13/2024 1358   Admission Weight 74.8 kg (165 lb) Documented at 07/13/2024 1358          Vitals:    07/14/24 0756   BP: 116/74   Pulse: 75   Resp: 16   Temp: 98.1 °F (36.7 °C)   SpO2: 96%       General Appearance:    Alert, cooperative, in no acute distress   Head:    Normocephalic, without obvious abnormality, atraumatic   Throat:   oral mucosa moist   Lungs:     Clear to auscultation,respirations regular, even and unlabored    Heart:    Regular rhythm and normal rate, normal S1 and S2, no murmur, no gallop, no rub, no click   Extremities:   Moves all extremities well, no edema, no cyanosis, no redness   Pulses:   Pulses palpable and equal bilaterally. Normal radial, carotid,  dorsalis pedis and posterior tibial pulses bilaterally.      Lab Review:      Results from last 7 days   Lab Units 07/14/24  0310 07/13/24  1417   SODIUM mmol/L 145 142   POTASSIUM mmol/L 3.9 4.4   CHLORIDE mmol/L 112* 112*   CO2 mmol/L 25.0 22.0   BUN mg/dL 14 13   CREATININE mg/dL 0.87 0.70   CALCIUM mg/dL 8.5* 8.4*   BILIRUBIN mg/dL  --  0.4   ALK PHOS U/L  --  41   ALT (SGPT) U/L  --  10   AST (SGOT) U/L  --  18   GLUCOSE mg/dL 128* 113*     Results from last 7 days   Lab Units 07/14/24  0509 " 07/14/24  0310   HSTROP T ng/L 6 7     @LABRCNT(bnp)@  Results from last 7 days   Lab Units 07/14/24  0310 07/13/24  1417   WBC 10*3/mm3 6.16 6.19   HEMOGLOBIN g/dL 11.4* 12.6   HEMATOCRIT % 35.0 38.7   PLATELETS 10*3/mm3 153 141         Results from last 7 days   Lab Units 07/13/24  1417   MAGNESIUM mg/dL 2.1     Lipid Panel          6/21/2024    15:06   Lipid Panel   Total Cholesterol 195    Triglycerides 66    HDL Cholesterol 72    VLDL Cholesterol 12    LDL Cholesterol  111          I personally viewed and interpreted the patient's EKG/Telemetry data        Near syncope      Assessment and Plan:    Syncope with a history of syncope and a prodrome of diaphoresis, nausea and lightheadedness.  Yesterday, she had a severe episode.  Last time she had an episode this severe was a year ago.  I think she does have dysautonomia probably because of her history of polio.  She has had recurrent episodes like this for years but this was one of the worst when she has had.  I did advise her that if this comes on, she should lie down and try to get her legs up and to try to put something very cold on her neck-ice or a frozen water bottle.  This may help genet the episode although I did tell her she could continue to have this.  In addition, advised her to take her olmesartan before bedtime.  We will want a make sure that she does not have a heart rhythm disturbance.  She has never had an MRI brain.  Check orthostatics today.  Check echocardiogram.  History of polio  Hypertension    Plan is as above.  It was a severe episode so she may need MRI of the brain just to make sure not missing anything there.  She has had prior CTs of the head which show no acute abnormalities but she is never had anything to look to see if she has prior strokes.  She had an episode in December where she had significant vision change which was temporary.    Move olmesartan to nighttime dosing.  Will order a 2-week Zio patch monitor.    Mayte KAISER  MD Ashlee  07/14/24  08:52 EDT.  Time spent in reviewing chart, discussion and examination:

## 2024-07-15 ENCOUNTER — APPOINTMENT (OUTPATIENT)
Dept: CARDIOLOGY | Facility: HOSPITAL | Age: 74
End: 2024-07-15
Payer: MEDICARE

## 2024-07-15 ENCOUNTER — READMISSION MANAGEMENT (OUTPATIENT)
Dept: CALL CENTER | Facility: HOSPITAL | Age: 74
End: 2024-07-15
Payer: MEDICARE

## 2024-07-15 VITALS
HEART RATE: 75 BPM | TEMPERATURE: 98.2 F | HEIGHT: 65 IN | RESPIRATION RATE: 16 BRPM | DIASTOLIC BLOOD PRESSURE: 73 MMHG | SYSTOLIC BLOOD PRESSURE: 132 MMHG | WEIGHT: 175.04 LBS | OXYGEN SATURATION: 96 % | BODY MASS INDEX: 29.16 KG/M2

## 2024-07-15 PROBLEM — R55 VASOVAGAL SYNCOPE: Status: ACTIVE | Noted: 2024-07-15

## 2024-07-15 LAB
ALBUMIN SERPL-MCNC: 3.3 G/DL (ref 3.5–5.2)
ALBUMIN/GLOB SERPL: 1.7 G/DL
ALP SERPL-CCNC: 39 U/L (ref 39–117)
ALT SERPL W P-5'-P-CCNC: 11 U/L (ref 1–33)
ANION GAP SERPL CALCULATED.3IONS-SCNC: 8.2 MMOL/L (ref 5–15)
AST SERPL-CCNC: 14 U/L (ref 1–32)
BASOPHILS # BLD AUTO: 0.04 10*3/MM3 (ref 0–0.2)
BASOPHILS NFR BLD AUTO: 0.8 % (ref 0–1.5)
BILIRUB SERPL-MCNC: <0.2 MG/DL (ref 0–1.2)
BUN SERPL-MCNC: 13 MG/DL (ref 8–23)
BUN/CREAT SERPL: 17.6 (ref 7–25)
CALCIUM SPEC-SCNC: 8.4 MG/DL (ref 8.6–10.5)
CHLORIDE SERPL-SCNC: 110 MMOL/L (ref 98–107)
CO2 SERPL-SCNC: 22.8 MMOL/L (ref 22–29)
CREAT SERPL-MCNC: 0.74 MG/DL (ref 0.57–1)
DEPRECATED RDW RBC AUTO: 41.2 FL (ref 37–54)
EGFRCR SERPLBLD CKD-EPI 2021: 86.1 ML/MIN/1.73
EOSINOPHIL # BLD AUTO: 0.17 10*3/MM3 (ref 0–0.4)
EOSINOPHIL NFR BLD AUTO: 3.5 % (ref 0.3–6.2)
ERYTHROCYTE [DISTWIDTH] IN BLOOD BY AUTOMATED COUNT: 12.7 % (ref 12.3–15.4)
GLOBULIN UR ELPH-MCNC: 2 GM/DL
GLUCOSE SERPL-MCNC: 92 MG/DL (ref 65–99)
HCT VFR BLD AUTO: 33.7 % (ref 34–46.6)
HGB BLD-MCNC: 11.3 G/DL (ref 12–15.9)
IMM GRANULOCYTES # BLD AUTO: 0.02 10*3/MM3 (ref 0–0.05)
IMM GRANULOCYTES NFR BLD AUTO: 0.4 % (ref 0–0.5)
LIPASE SERPL-CCNC: 53 U/L (ref 13–60)
LYMPHOCYTES # BLD AUTO: 1.13 10*3/MM3 (ref 0.7–3.1)
LYMPHOCYTES NFR BLD AUTO: 23.2 % (ref 19.6–45.3)
MCH RBC QN AUTO: 30.4 PG (ref 26.6–33)
MCHC RBC AUTO-ENTMCNC: 33.5 G/DL (ref 31.5–35.7)
MCV RBC AUTO: 90.6 FL (ref 79–97)
MONOCYTES # BLD AUTO: 0.46 10*3/MM3 (ref 0.1–0.9)
MONOCYTES NFR BLD AUTO: 9.4 % (ref 5–12)
NEUTROPHILS NFR BLD AUTO: 3.06 10*3/MM3 (ref 1.7–7)
NEUTROPHILS NFR BLD AUTO: 62.7 % (ref 42.7–76)
NRBC BLD AUTO-RTO: 0 /100 WBC (ref 0–0.2)
PLATELET # BLD AUTO: 137 10*3/MM3 (ref 140–450)
PMV BLD AUTO: 10.5 FL (ref 6–12)
POTASSIUM SERPL-SCNC: 4.1 MMOL/L (ref 3.5–5.2)
PROT SERPL-MCNC: 5.3 G/DL (ref 6–8.5)
RBC # BLD AUTO: 3.72 10*6/MM3 (ref 3.77–5.28)
SODIUM SERPL-SCNC: 141 MMOL/L (ref 136–145)
T4 FREE SERPL-MCNC: 1.14 NG/DL (ref 0.92–1.68)
TSH SERPL DL<=0.05 MIU/L-ACNC: 5.39 UIU/ML (ref 0.27–4.2)
WBC NRBC COR # BLD AUTO: 4.88 10*3/MM3 (ref 3.4–10.8)

## 2024-07-15 PROCEDURE — 85025 COMPLETE CBC W/AUTO DIFF WBC: CPT | Performed by: INTERNAL MEDICINE

## 2024-07-15 PROCEDURE — 83690 ASSAY OF LIPASE: CPT | Performed by: INTERNAL MEDICINE

## 2024-07-15 PROCEDURE — 84439 ASSAY OF FREE THYROXINE: CPT | Performed by: INTERNAL MEDICINE

## 2024-07-15 PROCEDURE — 93246 EXT ECG>7D<15D RECORDING: CPT

## 2024-07-15 PROCEDURE — 84443 ASSAY THYROID STIM HORMONE: CPT | Performed by: INTERNAL MEDICINE

## 2024-07-15 PROCEDURE — 80053 COMPREHEN METABOLIC PANEL: CPT | Performed by: INTERNAL MEDICINE

## 2024-07-15 PROCEDURE — 99232 SBSQ HOSP IP/OBS MODERATE 35: CPT | Performed by: FAMILY MEDICINE

## 2024-07-15 RX ORDER — PANTOPRAZOLE SODIUM 40 MG/1
40 TABLET, DELAYED RELEASE ORAL DAILY
Qty: 30 TABLET | Refills: 0 | Status: SHIPPED | OUTPATIENT
Start: 2024-07-15

## 2024-07-15 RX ORDER — DEXTROMETHORPHAN POLISTIREX 30 MG/5ML
30 SUSPENSION ORAL 2 TIMES DAILY PRN
Qty: 89 ML | Refills: 0 | Status: SHIPPED | OUTPATIENT
Start: 2024-07-15

## 2024-07-15 RX ORDER — CETIRIZINE HYDROCHLORIDE 10 MG/1
10 TABLET ORAL DAILY
Qty: 30 TABLET | Refills: 0 | Status: SHIPPED | OUTPATIENT
Start: 2024-07-15

## 2024-07-15 RX ORDER — FLUTICASONE PROPIONATE 50 MCG
2 SPRAY, SUSPENSION (ML) NASAL DAILY
Status: DISCONTINUED | OUTPATIENT
Start: 2024-07-15 | End: 2024-07-15 | Stop reason: HOSPADM

## 2024-07-15 RX ORDER — DEXTROMETHORPHAN POLISTIREX 30 MG/5ML
30 SUSPENSION ORAL EVERY 12 HOURS SCHEDULED
Status: DISCONTINUED | OUTPATIENT
Start: 2024-07-15 | End: 2024-07-15 | Stop reason: HOSPADM

## 2024-07-15 RX ORDER — CETIRIZINE HYDROCHLORIDE 10 MG/1
10 TABLET ORAL DAILY
Status: DISCONTINUED | OUTPATIENT
Start: 2024-07-15 | End: 2024-07-15 | Stop reason: HOSPADM

## 2024-07-15 RX ORDER — OLMESARTAN MEDOXOMIL 20 MG/1
20 TABLET ORAL
Start: 2024-07-15

## 2024-07-15 RX ORDER — FLUTICASONE PROPIONATE 50 MCG
2 SPRAY, SUSPENSION (ML) NASAL DAILY
Qty: 11.1 ML | Refills: 0 | Status: SHIPPED | OUTPATIENT
Start: 2024-07-15

## 2024-07-15 RX ADMIN — SENNOSIDES AND DOCUSATE SODIUM 2 TABLET: 50; 8.6 TABLET ORAL at 09:34

## 2024-07-15 RX ADMIN — DEXTROMETHORPHAN POLISTIREX 30 MG: 30 SUSPENSION ORAL at 14:17

## 2024-07-15 NOTE — PLAN OF CARE
Goal Outcome Evaluation:   Patient will remain free from falls while on this unit.

## 2024-07-15 NOTE — OUTREACH NOTE
Prep Survey      Flowsheet Row Responses   [facility] patient discharged from? [place]   Is LACE score < 7 ? Yes   Eligibility [facility]   Date of Admission 07/13/24   Date of Discharge 07/15/24   Discharge Disposition Home or Self Care   Discharge diagnosis Near syncope   Does the patient have one of the following disease processes/diagnoses(primary or secondary)? Other   Prep survey completed? Yes            Milena COURTNEY - Registered Nurse

## 2024-07-15 NOTE — PROGRESS NOTES
Hospital Follow Up    LOS:  LOS: 1 day   Patient Name: Irlanda Henry  Age/Sex: 73 y.o. female  : 1950  MRN: [id number]    Day of Service: 07/15/24   Length of Stay: 1  Encounter Provider: JEFRY Kumar  Place of Service: [facility] CARDIOLOGY  Patient Care Team:  Cris Rene APRN as PCP - General (Family Medicine)  Patricio Ulloa MD as Consulting Physician (Gastroenterology)  David Mcgill MD as Surgeon (Orthopedic Surgery)  Zhanna Gruber MSW as  (Amb Case Mgmt) (Population Health)    Subjective:     Chief Complaint: Syncope    Interval History:  Ms. Henry is a 73-year-old female with a history of polio and chronic right leg weakness, recurrent syncope.    Today she is feeling well with no acute complaints however she has not been up out of the bed except to go to the bedside commode.  She wears a special leg brace and shoe for her right leg weakness from polio.  And she does not have the shoe with her.  She has not been able to work with physical therapy because she does not have this shoe.  She reports a history of a positive tilt table test where she did have a syncopal episode greater than 10 years ago.    Review of Systems - Cardiovascular ROS: negative for - chest pain, dyspnea on exertion, edema, irregular heartbeat, loss of consciousness, palpitations, rapid heart rate, or shortness of breath    Objective:     Objective:  Temp:  [97.5 °F (36.4 °C)-98.6 °F (37 °C)] 98.1 °F (36.7 °C)  Heart Rate:  [65-89] 68  Resp:  [16-20] 16  BP: (112-153)/(56-81) 112/61     Intake/Output Summary (Last 24 hours) at 7/15/2024 1120  Last data filed at 7/15/2024 0720  Gross per 24 hour   Intake 1450 ml   Output --   Net 1450 ml     Body mass index is 29.13 kg/m².      24  0425 24  1119 07/15/24  0526   Weight: 77 kg (169 lb 12.1 oz) 77 kg (169 lb 12.1 oz) 79.4 kg (175 lb 0.7 oz)     Weight change: 2.157 kg (4 lb 12.1  "oz)    Constitutional:       Appearance: Healthy appearance. Not in distress.   Pulmonary:      Effort: Pulmonary effort is normal.      Breath sounds: Normal breath sounds. No wheezing. No rhonchi. No rales.   Cardiovascular:      Normal rate. Regular rhythm.      Murmurs: There is no murmur.   Pulses:     Intact distal pulses.   Edema:     Peripheral edema absent.   Skin:     General: Skin is warm and dry.   Neurological:      Mental Status: Alert and oriented to person, place and time.            Lab Review:   Results from last 7 days   Lab Units 07/15/24  0555 07/14/24  0310 07/13/24  1417   SODIUM mmol/L 141 145 142   POTASSIUM mmol/L 4.1 3.9 4.4   CHLORIDE mmol/L 110* 112* 112*   CO2 mmol/L 22.8 25.0 22.0   BUN mg/dL 13 14 13   CREATININE mg/dL 0.74 0.87 0.70   GLUCOSE mg/dL 92 128* 113*   CALCIUM mg/dL 8.4* 8.5* 8.4*   AST (SGOT) U/L 14  --  18   ALT (SGPT) U/L 11  --  10     Results from last 7 days   Lab Units 07/14/24  0509 07/14/24  0310   HSTROP T ng/L 6 7     Results from last 7 days   Lab Units 07/15/24  0555 07/14/24  0310   WBC 10*3/mm3 4.88 6.16   HEMOGLOBIN g/dL 11.3* 11.4*   HEMATOCRIT % 33.7* 35.0   PLATELETS 10*3/mm3 137* 153         Results from last 7 days   Lab Units 07/13/24  1417   MAGNESIUM mg/dL 2.1           Invalid input(s): \"LDLCALC\"      Results from last 7 days   Lab Units 07/15/24  0555   TSH uIU/mL 5.390*     I reviewed the patient's new clinical results.  I personally viewed and interpreted the patient's EKG  Current Medications:   Scheduled Meds:losartan, 25 mg, Oral, Nightly      Continuous Infusions:sodium chloride, 75 mL/hr, Last Rate: 75 mL/hr (07/14/24 1359)        Allergies:  Allergies   Allergen Reactions    Ct Contrast Nausea And Vomiting     Echo 7/14/24    Left ventricular systolic function is normal. Left ventricular ejection fraction appears to be 61 - 65%.    Left ventricular diastolic function was normal.    Estimated right ventricular systolic pressure from " tricuspid regurgitation is normal (<35 mmHg).    Mild dilation of the ascending aorta is present.  3.8 cm.    There is a small (<1cm) pericardial effusion. There is no evidence of cardiac tamponade.      CXR 7/13/24:  IMPRESSION:  No evidence for active disease in the chest.    Assessment:     Syncope  History of polio  3.  Hypertension  4.  Dilated ascending aorta, mild: 3.8 cm    Plan:   MRI brain MRI brain was completed no acute intracranial abnormality.  Echocardiogram shows normal LV function, no significant valvular heart disease, mild dilation of the ascending aorta, small less than 1 cm pericardial effusion without evidence of tamponade.  Will take her ARB at nighttime.  2-week Zio patch monitor at discharge.   She reports a positive tilt table test in the past.     She has been unable to have full orthostatic vital signs measured or work with physical therapy as she has not had the correct shoe she wears with her leg brace for her right leg weakness secondary to polio.         JEFRY Kumar  07/15/24  11:20 EDT  Electronically signed by JEFRY Kumar, 07/15/24, 11:20 AM EDT.   //   Speaking Coherently

## 2024-07-15 NOTE — DISCHARGE SUMMARY
NAME: Irlanda Henry ADMIT: 2024   : 1950  PCP: Cris Rene APRN    MRN: [id number] LOS: 1 days   AGE/SEX: 73 y.o. 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       SECONDARY DIAGNOSES  Past Medical History:   Diagnosis Date    Cataract     Encounter for diagnostic colonoscopy due to change in bowel habits 2023    Polio     Vasovagal syncope        CONSULTS   Cardiology    HOSPITAL COURSE  74 yo with history of HTN, polio with right leg weakness, history of recurrent issues with N/V as well as recurrent issues with vasovagal symptoms. Yesterday she had been having nausea, vomitting, diarrhea and then had episode of diaphoresis, light-headedness, weakness.      Etiology of symptoms is unclear. Agree with Dr. Rosado maybe some dysautonomia related to her history of polio resulting in more severe vasovagal spells. No current abdominal pain or nausea. 2D ECHO completed with normal EF and valvular function. MRI brain with small vessel disease but no stroke or tumor. No current abd pain or GI symptoms while in the hospital for 2 days. She's had previous cholecystectomy. She feels well to discharge today with ZIO patch and outpatient follow up.     Of note TSH was slightly elevated at 5.3 but just in the past month or so it was normal. Will have her follow up with Cris Rene APRN to monitor this and for need for thyroid supplementation. Mild cough may be viral vs allergy related. No evidence for PNA or  bacterial infection.     DIAGNOSTICS           07/15/2024 0555 07/15/2024 0743 Comprehensive Metabolic Panel [[id number]]    (Abnormal)   Blood    Final result Component Value Units   Glucose 92 mg/dL   BUN 13 mg/dL   Creatinine 0.74 mg/dL   Sodium 141 mmol/L   Potassium 4.1 mmol/L   Chloride 110 High  mmol/L   CO2 22.8 mmol/L   Calcium 8.4 Low  mg/dL   Total Protein 5.3 Low  g/dL   Albumin 3.3 Low  g/dL   ALT (SGPT) 11 U/L   AST (SGOT) 14 U/L   Alkaline Phosphatase 39 U/L   Total Bilirubin <0.2 mg/dL   Globulin 2.0 gm/dL   A/G Ratio 1.7 g/dL   BUN/Creatinine Ratio 17.6    Anion Gap 8.2 mmol/L   eGFR 86.1 mL/min/1.73          07/15/2024 0555 07/15/2024 0743 Lipase [[id number]]   Blood    Final result Component Value Units   Lipase 53 U/L          07/15/2024 0555 07/15/2024 0743 TSH [[id number]]   (Abnormal)   Blood    Final result Component Value Units   TSH 5.390 High  uIU/mL          07/15/2024 0555 07/15/2024 0729 CBC Auto Differential [[id number]]   (Abnormal)   Blood    Final result Component Value Units   WBC 4.88 10*3/mm3   RBC 3.72 Low  10*6/mm3   Hemoglobin 11.3 Low  g/dL   Hematocrit 33.7 Low  %   MCV 90.6 fL   MCH 30.4 pg   MCHC 33.5 g/dL   RDW 12.7 %   RDW-SD 41.2 fl   MPV 10.5 fL   Platelets 137 Low  10*3/mm3   Neutrophil % 62.7 %   Lymphocyte % 23.2 %   Monocyte % 9.4 %   Eosinophil % 3.5 %   Basophil % 0.8 %   Immature Grans % 0.4 %   Neutrophils, Absolute 3.06 10*3/mm3   Lymphocytes, Absolute 1.13 10*3/mm3   Monocytes, Absolute 0.46 10*3/mm3   Eosinophils, Absolute 0.17 10*3/mm3   Basophils, Absolute 0.04 10*3/mm3   Immature Grans, Absolute 0.02 10*3/mm3   nRBC 0.0 /100 WBC          07/15/2024 0555 07/15/2024 1146 T4, Free [[id number]]   Blood    In process Component Value   No component results          07/14/2024 0509 07/14/2024 0539 High Sensitivity Troponin T 2Hr [[id number]]    Blood    Final result Component Value Units   HS Troponin T 6 ng/L   Troponin T Delta -1 ng/L          07/14/2024 0310  07/14/2024 0420 Basic Metabolic Panel [[id number]]    (Abnormal)   Blood    Final result Component Value Units   Glucose 128 High  mg/dL   BUN 14 mg/dL   Creatinine 0.87 mg/dL   Sodium 145 mmol/L   Potassium 3.9 mmol/L   Chloride 112 High  mmol/L   CO2 25.0 mmol/L   Calcium 8.5 Low  mg/dL   BUN/Creatinine Ratio 16.1    Anion Gap 8.0 mmol/L   eGFR 70.9 mL/min/1.73          07/14/2024 0310 07/14/2024 0402 CBC (No Diff) [[id number]]   (Abnormal)   Blood    Final result Component Value Units   WBC 6.16 10*3/mm3   RBC 3.82 10*6/mm3   Hemoglobin 11.4 Low  g/dL   Hematocrit 35.0 %   MCV 91.6 fL   MCH 29.8 pg   MCHC 32.6 g/dL   RDW 12.8 %   RDW-SD 42.8 fl   MPV 10.5 fL   Platelets 153 10*3/mm3          07/14/2024 0310 07/14/2024 0420 High Sensitivity Troponin T [[id number]]    Blood    Final result Component Value Units   HS Troponin T 7 ng/L          07/13/2024 1454 07/13/2024 1613 Respiratory Panel PCR w/COVID-19(SARS-CoV-2) RON/[facility]A/GAETANO/PAD/COR/FABIAN In-House, NP Swab in UTM/VTM, 2 HR TAT - Swab, Nasopharynx [[id number]]    Swab from Nasopharynx    Final result Component Value   ADENOVIRUS, PCR Not Detected   Coronavirus 229E Not Detected   Coronavirus HKU1 Not Detected   Coronavirus NL63 Not Detected   Coronavirus OC43 Not Detected   COVID19 Not Detected   Human Metapneumovirus Not Detected   Human Rhinovirus/Enterovirus Not Detected   Influenza A PCR Not Detected   Influenza B PCR Not Detected   Parainfluenza Virus 1 Not Detected   Parainfluenza Virus 2 Not Detected   Parainfluenza Virus 3 Not Detected   Parainfluenza Virus 4 Not Detected   RSV, PCR Not Detected   Bordetella pertussis pcr Not Detected   Bordetella parapertussis PCR Not Detected   Chlamydophila pneumoniae PCR Not Detected   Mycoplasma pneumo by PCR Not Detected             07/13/2024 1424 07/13/2024 1529 Urinalysis With Microscopic If Indicated (No Culture) - Straight Cath [[id number]]   (Abnormal)   Urine from Straight Cath    Final result Component Value  "  Color, UA Yellow   Appearance, UA Clear   pH, UA 5.5   Specific Gravity, UA 1.012   Glucose, UA Negative   Ketones, UA Negative   Bilirubin, UA Negative   Blood, UA Negative   Protein, UA Negative   Leuk Esterase, UA Small (1+) Abnormal    Nitrite, UA Negative   Urobilinogen, UA 0.2 E.U./dL          07/13/2024 1424 07/13/2024 1529 Urinalysis, Microscopic Only - Straight Cath [[id number]]   Urine from Straight Cath    Final result Component Value Units   RBC, UA 0-2 /HPF   WBC, UA 0-2 /HPF   Bacteria, UA None Seen /HPF   Squamous Epithelial Cells, UA 0-2 /HPF   Hyaline Casts, UA 0-2 /LPF   Methodology Automated Microscopy            2D ECHO 7/14/24    Left ventricular systolic function is normal. Left ventricular ejection fraction appears to be 61 - 65%.    Left ventricular diastolic function was normal.    Estimated right ventricular systolic pressure from tricuspid regurgitation is normal (<35 mmHg).    Mild dilation of the ascending aorta is present.  3.8 cm.    There is a small (<1cm) pericardial effusion. There is no evidence of cardiac tamponade.    PHYSICAL EXAM  Objective:  Vital signs: (most recent): Blood pressure 112/61, pulse 68, temperature 98.1 °F (36.7 °C), temperature source Oral, resp. rate 16, height 165.1 cm (65\"), weight 79.4 kg (175 lb 0.7 oz), SpO2 96%.                Alert  nad  No resp distress  RRR  Soft, nt  CONDITION ON DISCHARGE  Stable.      DISCHARGE DISPOSITION   Home or Self Care      DISCHARGE MEDICATIONS       Your medication list        START taking these medications        Instructions Last Dose Given Next Dose Due   cetirizine 10 MG tablet  Commonly known as: zyrTEC      Take 1 tablet by mouth Daily.       dextromethorphan polistirex ER 30 MG/5ML Suspension Extended Release oral suspension  Commonly known as: DELSYM      Take 5 mL by mouth 2 (Two) Times a Day As Needed (cough).       fluticasone 50 MCG/ACT nasal spray  Commonly known as: FLONASE      2 sprays by Each Nare route " Daily.              CHANGE how you take these medications        Instructions Last Dose Given Next Dose Due   olmesartan 20 MG tablet  Commonly known as: BENICAR  What changed: when to take this      Take 1 tablet by mouth every night at bedtime.                 Where to Get Your Medications        These medications were sent to [facility] [id number] - [place], [place] - [address] [facility] [address] - [phone]  - [phone]   [address], [place] [id number]      Phone: [phone]   cetirizine 10 MG tablet  dextromethorphan polistirex ER 30 MG/5ML Suspension Extended Release oral suspension  fluticasone 50 MCG/ACT nasal spray       Information about where to get these medications is not yet available    Ask your nurse or doctor about these medications  olmesartan 20 MG tablet          Future Appointments   Date Time Provider Department Center   8/6/2024  2:30 PM RON BRKG CT 1 BH RON CT BR None   8/6/2024  2:50 PM RON BRKG DEXA BH RON DX BR None   12/27/2024  2:00 PM Cris Rene APRN [facility]     Additional Instructions for the Follow-ups that You Need to Schedule       Discharge Follow-up with Specialty: PCP in 1-2 weeks. Cardiology as outpatient in 4 weeks   As directed      Specialty: PCP in 1-2 weeks. Cardiology as outpatient in 4 weeks               Follow-up Information       Cris Rene APRN .    Specialties: Family Medicine, Nurse Practitioner  Contact information:  [address]  [facility] [id number] [phone]                             TEST  RESULTS PENDING AT DISCHARGE  Pending Labs       Order Current Status    T4, Free In process               Brennan Baker MD  [facility]  07/15/24  11:51 EDT      Time: greater than 32 minutes on discharge  It was a pleasure taking care of this patient while in the hospital.

## 2024-07-16 ENCOUNTER — TRANSITIONAL CARE MANAGEMENT TELEPHONE ENCOUNTER (OUTPATIENT)
Dept: CALL CENTER | Facility: HOSPITAL | Age: 74
End: 2024-07-16
Payer: MEDICARE

## 2024-07-16 NOTE — OUTREACH NOTE
Call Center TCM Note      Flowsheet Row Responses   [facility] patient discharged from? [place]   Does the patient have one of the following disease processes/diagnoses(primary or secondary)? Other   TCM attempt successful? No   Unsuccessful attempts Attempt 2            Echo Shen RN    7/16/2024, 11:33 EDT

## 2024-07-16 NOTE — OUTREACH NOTE
Call Center TCM Note      Flowsheet Row Responses   [facility] patient discharged from? [place]   Does the patient have one of the following disease processes/diagnoses(primary or secondary)? Other   TCM attempt successful? No   Unsuccessful attempts Attempt 1            Echo Shen RN    7/16/2024, 09:49 EDT

## 2024-07-17 ENCOUNTER — TRANSITIONAL CARE MANAGEMENT TELEPHONE ENCOUNTER (OUTPATIENT)
Dept: CALL CENTER | Facility: HOSPITAL | Age: 74
End: 2024-07-17
Payer: MEDICARE

## 2024-07-17 NOTE — OUTREACH NOTE
Call Center TCM Note      Flowsheet Row Responses   [facility] patient discharged from? [place]   Does the patient have one of the following disease processes/diagnoses(primary or secondary)? Other   TCM attempt successful? No   Unsuccessful attempts Attempt 3            Stephanie Lopez LPN    7/17/2024, 09:37 EDT

## 2024-07-26 ENCOUNTER — TELEPHONE (OUTPATIENT)
Dept: INTERNAL MEDICINE | Facility: CLINIC | Age: 74
End: 2024-07-26

## 2024-07-26 ENCOUNTER — OFFICE VISIT (OUTPATIENT)
Dept: INTERNAL MEDICINE | Facility: CLINIC | Age: 74
End: 2024-07-26
Payer: MEDICARE

## 2024-07-26 VITALS
DIASTOLIC BLOOD PRESSURE: 78 MMHG | TEMPERATURE: 98.2 F | BODY MASS INDEX: 27.86 KG/M2 | SYSTOLIC BLOOD PRESSURE: 116 MMHG | OXYGEN SATURATION: 95 % | HEART RATE: 102 BPM | HEIGHT: 65 IN | WEIGHT: 167.2 LBS

## 2024-07-26 DIAGNOSIS — U07.1 COVID-19: ICD-10-CM

## 2024-07-26 DIAGNOSIS — J40 BRONCHITIS: ICD-10-CM

## 2024-07-26 DIAGNOSIS — Z09 HOSPITAL DISCHARGE FOLLOW-UP: Primary | ICD-10-CM

## 2024-07-26 DIAGNOSIS — R05.1 ACUTE COUGH: ICD-10-CM

## 2024-07-26 LAB
EXPIRATION DATE: ABNORMAL
FLUAV AG UPPER RESP QL IA.RAPID: NOT DETECTED
FLUBV AG UPPER RESP QL IA.RAPID: NOT DETECTED
INTERNAL CONTROL: ABNORMAL
Lab: ABNORMAL
SARS-COV-2 AG UPPER RESP QL IA.RAPID: DETECTED

## 2024-07-26 RX ORDER — AZITHROMYCIN 500 MG/1
500 TABLET, FILM COATED ORAL DAILY
COMMUNITY
Start: 2024-07-24 | End: 2024-07-29

## 2024-07-26 RX ORDER — BENZONATATE 100 MG/1
100 CAPSULE ORAL 3 TIMES DAILY PRN
Qty: 30 CAPSULE | Refills: 0 | Status: SHIPPED | OUTPATIENT
Start: 2024-07-26

## 2024-07-26 RX ORDER — PREDNISONE 20 MG/1
TABLET ORAL
COMMUNITY
Start: 2024-07-24 | End: 2024-07-31

## 2024-07-26 RX ORDER — DEXTROMETHORPHAN HYDROBROMIDE AND PROMETHAZINE HYDROCHLORIDE 15; 6.25 MG/5ML; MG/5ML
5 SYRUP ORAL 4 TIMES DAILY PRN
Qty: 473 ML | Refills: 0 | Status: SHIPPED | OUTPATIENT
Start: 2024-07-26

## 2024-07-26 NOTE — PROGRESS NOTES
Transitional Care Follow Up Visit  Subjective     Irlanda Henry is a 74 y.o. female who presents for a transitional care management visit.    Within 48 business hours after discharge our office contacted her via telephone to coordinate her care and needs.      I reviewed and discussed the details of that call along with the discharge summary, hospital problems, inpatient lab results, inpatient diagnostic studies, and consultation reports with Irlanda.     Current outpatient and discharge medications have been reconciled for the patient.  Reviewed by: JEFRY Pedro          7/15/2024     6:59 PM   Date of TCM Phone Call   [facility]   Date of Admission 7/13/2024   Date of Discharge 7/15/2024   Discharge Disposition Home or Self Care     Risk for Readmission (LACE) No data recorded  ED to Hosp-Admission (Discharged) with Brennan Baker MD; Chato Cordova MD; Kena Bright MD (07/13/2024)   Discharge Summary by Brennan Baker MD (07/15/2024 11:51 AM)       History of Present Illness   Course During Hospital Stay:  3  Answers submitted by the patient for this visit:  Other (Submitted on 7/25/2024)  Please describe your symptoms.: Servere cough, Sore Throat,weakness, tiredness to the point of not wanting to even move, haven’t been able to talk in 4-5 days.been in hospital for 3 days daysservere vasel vagal spell.  Have you had these symptoms before?: Yes  How long have you been having these symptoms?: Greater than 2 weeks  Please list any medications you are currently taking for this condition.: Prednisone, Azithromycin (500  Primary Reason for Visit (Submitted on 7/25/2024)  What is the primary reason for your visit?: Other     The following portions of the patient's history were reviewed and updated as appropriate: allergies, current medications, past family history, past medical history, past social history, past surgical history, and problem list.  Before her  "admission she states she was light headed, went to her bedroom and propped her legs up, had sweat running off her and threw up. She was puking and had an episode diarrhea. She then went to the hospital and was admitted for a syncopal episode. She saw Dr Rosado in the hospital and was ordered a Zio patch and is currently wearing it today.     Ms Henry states she is feeling horrible when she was discharged.However she recently went to [place] and has felt ill ever since. Her daughter is here today with her as added historian. She and her daughter states she has had a Cough since Friday. Lost her voice, She went to Urgent care diagnosed with Acute Bronchitis and Sinusitis. She was not swabbed for Covid, Flu or Strep. She is complaining of headache today. She is coughing up green sputum while in office today. She is swabbed for Covid in office today and she is Positive for COVID. She does not want to go to the Hospital despite recommendation by me today she does not want to leave her grandchildren at home with her daughter. She is very tearful today in office and appears lethargic. We have discussed if she becomes short of breath or dizzy to return to the hospital.     Review of Systems   Constitutional:  Positive for fatigue.   HENT:  Positive for congestion, facial swelling, rhinorrhea, sinus pressure, sinus pain, sore throat, trouble swallowing and voice change.    Eyes: Negative.    Respiratory:  Positive for cough, chest tightness, shortness of breath and wheezing. Negative for apnea.    Musculoskeletal: Negative.        Objective   /78 (BP Location: Left arm, Patient Position: Sitting, Cuff Size: Adult)   Pulse 102   Temp 98.2 °F (36.8 °C) (Oral)   Ht 165.1 cm (65\")   Wt 75.8 kg (167 lb 3.2 oz)   SpO2 95%   BMI 27.82 kg/m²   Physical Exam  Vitals reviewed.   Constitutional:       General: She is awake. She is not in acute distress.     Appearance: Normal appearance. She is well-groomed. She is " ill-appearing. She is not toxic-appearing or diaphoretic.   HENT:      Head: Normocephalic.      Right Ear: Hearing normal.      Left Ear: Hearing normal.      Nose: Congestion and rhinorrhea present. Rhinorrhea is purulent.      Right Sinus: Maxillary sinus tenderness and frontal sinus tenderness present.      Left Sinus: Maxillary sinus tenderness and frontal sinus tenderness present.      Mouth/Throat:      Lips: Pink.      Mouth: Mucous membranes are moist.   Eyes:      General: Lids are normal.   Cardiovascular:      Rate and Rhythm: Regular rhythm. Tachycardia present.      Pulses: Normal pulses.      Heart sounds: Normal heart sounds.   Pulmonary:      Effort: Pulmonary effort is normal.      Breath sounds: Examination of the right-upper field reveals rhonchi. Examination of the left-upper field reveals rhonchi. Examination of the right-lower field reveals decreased breath sounds. Examination of the left-lower field reveals decreased breath sounds. Rhonchi present. No wheezing or rales.   Abdominal:      General: Abdomen is flat. Bowel sounds are normal.      Palpations: Abdomen is soft.   Musculoskeletal:      Right lower leg: No edema.      Left lower leg: No edema.   Skin:     General: Skin is warm and dry.      Capillary Refill: Capillary refill takes less than 2 seconds.   Neurological:      General: No focal deficit present.      Mental Status: She is lethargic.   Psychiatric:         Attention and Perception: Attention and perception normal.         Mood and Affect: Mood is anxious. Affect is tearful.         Speech: Speech normal.         Behavior: Behavior normal.         Cognition and Memory: Cognition and memory normal.         Judgment: Judgment normal.      Comments: Horse voice noted.          Assessment & Plan   Diagnoses and all orders for this visit:    1. Hospital discharge follow-up (Primary)    2. COVID-19  -     POCT SARS-CoV-2 Antigen JUSTUS + Flu    3. Bronchitis  Comments:  continue with  prednisone, use tessalon pearls once cough has stopped being productive take cough medication at night for rest.  Orders:  -     benzonatate (Tessalon Perles) 100 MG capsule; Take 1 capsule by mouth 3 (Three) Times a Day As Needed for Cough.  Dispense: 30 capsule; Refill: 0    4. Acute cough  Comments:  Prednisone, tessalon pearls and cough medication  Orders:  -     POCT SARS-CoV-2 Antigen JUSTUS + Flu  -     POCT rapid strep A  -     promethazine-dextromethorphan (PROMETHAZINE-DM) 6.25-15 MG/5ML syrup; Take 5 mL by mouth 4 (Four) Times a Day As Needed for Cough.  Dispense: 473 mL; Refill: 0  -     benzonatate (Tessalon Perles) 100 MG capsule; Take 1 capsule by mouth 3 (Three) Times a Day As Needed for Cough.  Dispense: 30 capsule; Refill: 0    Honey and Lemon Tea for cough  1 Tbsp lemon juice   2 Tbsp honey   1/2 cup or more of hot water  Directions:   Put honey and lemon juice into a tea cup or mug. Add hot water and stir. Add more lemon juice, honey, or hot water to taste.  Sip on this and have two or three per day while cough is present.     Not for children less than 2 years of age.   Caution if diabetic.       Symptom Treatment:     Fever/ Chills / Headache / Body Aches:    Tylenol is recommended: if you are not getting any improvement you can alternate Tylenol and Ibuprofen (Ibuprofen should always be taken with food)      You may take over-the-counter Tylenol Cold/Flu Remedy (if you do - do not take additional tylenol as this will have tylenol included)     We advise that patients stay well hydrated, particularly those patients with sustained or higher fevers, in whom insensible fluid losses may be significant.    ?Cough that is persistent, interferes with sleep, or causes discomfort can be managed with an over-the-counter cough medication.     Additionally take if checked and prescribed:   [] Benzonatate 100 mg can be taken orally three times a day as needed.  Do not take with a hot beverage.       Breathing  exercises  Pursed lip breathing exercises:   Sitting upright or slightly reclining, relax your neck and shoulder muscles.   With your mouth closed, inhale through the nose for 2 seconds, as if smelling a flower.   Exhale slowly (for 4 seconds if possible) through pursed lips, as if blowing out birthday candles.               Repeat inhalation and exhalation cycles for 2 minutes, several times a day and              when needed.   Deep breathing exercises:   Recline in bed or on a sofa with a pillow under your head and knees. If reclining is not possible, this may be done while sitting upright.   Place one hand on your belly, the other hand on your chest.   Slowly inhale through your nose; let your lungs fill with air, allowing your belly to rise. (The hand on the belly should move more than the hand on the chest.)   Breathe out through your nose, and as you exhale, feel your belly lower.   Repeat the inhalation and exhalation cycles for 2 to 5 minutes several times a day and when needed.       Worsening:      If you have new onset of shortness of breath, worsening shortness of breath, dizziness, and mental status changes such as confusion.   GO TO THE EMERGENCY ROOM OR CALL 911.       Isolation:     Current CDC guideline recommends isolation for 5 days from symptom onset and at least 24 hours being fever-free without taking fever reducer like Tylenol or Ibuprofen.       [place] Coronavirus Hotline: 1.369.917.1420      Thank you for allowing us to care for you,  JEFRY Pedro

## 2024-08-05 ENCOUNTER — TELEPHONE (OUTPATIENT)
Dept: CARDIOLOGY | Facility: CLINIC | Age: 74
End: 2024-08-05
Payer: MEDICARE

## 2024-08-05 NOTE — TELEPHONE ENCOUNTER
Called and left VM. Will continue to try to reach patient. HUB transfer call to triage.     Yamini Clemens RN  Triage [facility] [place]

## 2024-08-06 ENCOUNTER — HOSPITAL ENCOUNTER (OUTPATIENT)
Facility: HOSPITAL | Age: 74
Discharge: HOME | End: 2024-08-06
Payer: MEDICARE

## 2024-08-06 DIAGNOSIS — Z78.0 ENCOUNTER FOR OSTEOPOROSIS SCREENING IN ASYMPTOMATIC POSTMENOPAUSAL PATIENT: ICD-10-CM

## 2024-08-06 DIAGNOSIS — Z13.820 ENCOUNTER FOR OSTEOPOROSIS SCREENING IN ASYMPTOMATIC POSTMENOPAUSAL PATIENT: ICD-10-CM

## 2024-08-06 DIAGNOSIS — Z83.3 FAMILY HISTORY OF DIABETES MELLITUS: ICD-10-CM

## 2024-08-06 PROCEDURE — 71250 CT THORAX DX C-: CPT

## 2024-08-06 PROCEDURE — 77080 DXA BONE DENSITY AXIAL: CPT

## 2024-08-06 NOTE — TELEPHONE ENCOUNTER
Called and left VM. Will continue to try to reach patient. HUB transfer call to triage.     Yamini Clemens RN  Triage [facility] [place]

## 2024-08-06 NOTE — TELEPHONE ENCOUNTER
Pt called back. Went over results. She verbalized understanding.    Thank you,    Mali Wilson, RN  Triage [facility] [place]  08/06/24 09:18 EDT

## 2024-08-12 ENCOUNTER — TELEPHONE (OUTPATIENT)
Dept: INTERNAL MEDICINE | Facility: CLINIC | Age: 74
End: 2024-08-12
Payer: MEDICARE

## 2024-08-12 NOTE — TELEPHONE ENCOUNTER
Caller: Irlanda Henry    Relationship: Self    Best call back number: [phone]    Who are you requesting to speak with (clinical staff, provider,  specific staff member): JHOANA HANDY    What was the call regarding: STATED THAT THEY WOULD LIKE TO GET A CALL FROM JHOANA TO DISCUSS MORE ABOUT THE LAB RESULTS SHE CALLED HER ON THIS MORNING. PLEASE CALL AND ADVISE

## 2024-08-13 DIAGNOSIS — R91.1 LUNG NODULE: Primary | ICD-10-CM

## 2024-08-14 ENCOUNTER — TELEPHONE (OUTPATIENT)
Dept: INTERNAL MEDICINE | Facility: CLINIC | Age: 74
End: 2024-08-14
Payer: MEDICARE

## 2024-08-14 NOTE — TELEPHONE ENCOUNTER
Called pt [phone] to see what test pt is referring to , pt wanted to understand her DEXA scan in full as she states someone said you either pass or fail. I explained pt results/report to pt and pt understood with no further questions and will try and do the recommended OTC supplements as pt is on fixed income.

## 2024-08-22 ENCOUNTER — OFFICE VISIT (OUTPATIENT)
Dept: CARDIOLOGY | Facility: CLINIC | Age: 74
End: 2024-08-22
Payer: MEDICARE

## 2024-08-22 VITALS
HEIGHT: 65 IN | SYSTOLIC BLOOD PRESSURE: 132 MMHG | HEART RATE: 68 BPM | WEIGHT: 158 LBS | BODY MASS INDEX: 26.33 KG/M2 | DIASTOLIC BLOOD PRESSURE: 80 MMHG

## 2024-08-22 DIAGNOSIS — R55 NEAR SYNCOPE: ICD-10-CM

## 2024-08-22 DIAGNOSIS — I10 PRIMARY HYPERTENSION: Primary | ICD-10-CM

## 2024-08-22 DIAGNOSIS — R73.03 PREDIABETES: ICD-10-CM

## 2024-08-22 NOTE — PROGRESS NOTES
Date of Office Visit: 2024  Encounter Provider: JEFRY Escamilla  Place of Service: [facility] CARDIOLOGY  Patient Name: Irlanda Henry  :1950    Chief Complaint   Patient presents with    Hospital Follow Up Visit     07/15/2024   : Syncope    HPI: Irlanda Henry is a 74 y.o. female who is a patient recently seen by Dr. Rosado in the hospital for syncope.  She has a history of hypertension, hyperlipidemia, polio and vasovagal near syncope , due to abnormal stress perfusion study, patient underwent cardiac cath.  She is found to have normal coronary arteries.    She presented to the hospital on 2024 after near syncopal episode sounded to be a vasovagal response.  Accompanied with nausea, vomiting and profuse sweating.  She was not orthostatic.  Troponin negative x 2.  Respiratory panel negative.  EKG was normal.  RI of brain showed no acute intracranial abnormality.  Echocardiogram showed normal LV function and no significant valvular disease.    Losartan was moved to nighttime dosing and a 2-week Zio patch monitor was ordered.  Monitor study was benign.  Patient notes that this has happened about once a year for several years.  She presents today and denies any chest pain, pressure, palpitations or lightheadedness.  Blood pressure is well-controlled.  EKG is normal.    Previous testing and notes have been reviewed by me.   Past Medical History:   Diagnosis Date    Cataract     Encounter for diagnostic colonoscopy due to change in bowel habits 2023    Polio     Vasovagal syncope        Past Surgical History:   Procedure Laterality Date    CARDIAC CATHETERIZATION N/A 2017    Procedure: Left Heart Cath;  Surgeon: Jose Murphy MD;  Location:  [facility] CATH INVASIVE LOCATION;  Service:     CARDIAC CATHETERIZATION N/A 2017    Procedure: Coronary angiography;  Surgeon: Jose Murphy MD;  Location:  [facility] CATH INVASIVE LOCATION;  Service:      CARPAL TUNNEL RELEASE      CATARACT EXTRACTION      CHOLECYSTECTOMY      COLONOSCOPY      COLONOSCOPY N/A 6/22/2023    Procedure: COLONOSCOPY to cecum;  Surgeon: Patricio Ulloa MD;  Location: [facility] ENDOSCOPY;  Service: Gastroenterology;  Laterality: N/A;  PRE - abd pain  POST - diverticulosis    LEG SURGERY      multiple surgeries for polio    RETINAL DETACHMENT REPAIR Left        Social History     Socioeconomic History    Marital status: Legally    Tobacco Use    Smoking status: Never    Smokeless tobacco: Never   Vaping Use    Vaping status: Never Used   Substance and Sexual Activity    Alcohol use: Yes     Comment: rare    Drug use: No    Sexual activity: Not Currently     Birth control/protection: None       Family History   Problem Relation Age of Onset    Breast cancer Maternal Aunt     Heart attack Mother         in 90s     Kidney failure Father        Review of Systems   Constitutional: Negative.   HENT: Negative.     Eyes: Negative.    Cardiovascular: Negative.    Respiratory: Negative.     Endocrine: Negative.    Hematologic/Lymphatic: Negative.    Skin: Negative.    Musculoskeletal: Negative.    Gastrointestinal: Negative.    Genitourinary: Negative.    Neurological: Negative.    Psychiatric/Behavioral: Negative.     Allergic/Immunologic: Negative.        Allergies   Allergen Reactions    Ct Contrast Nausea And Vomiting         Current Outpatient Medications:     olmesartan (BENICAR) 20 MG tablet, Take 1 tablet by mouth every night at bedtime., Disp: , Rfl:     benzonatate (Tessalon Perles) 100 MG capsule, Take 1 capsule by mouth 3 (Three) Times a Day As Needed for Cough., Disp: 30 capsule, Rfl: 0    dextromethorphan polistirex ER (DELSYM) 30 MG/5ML Suspension Extended Release oral suspension, Take 5 mL by mouth 2 (Two) Times a Day As Needed (cough)., Disp: 89 mL, Rfl: 0    fluticasone (FLONASE) 50 MCG/ACT nasal spray, 2 sprays by Each Nare route Daily., Disp: 11.1 mL, Rfl: 0     "pantoprazole (PROTONIX) 40 MG EC tablet, Take 1 tablet by mouth Daily., Disp: 30 tablet, Rfl: 0    promethazine-dextromethorphan (PROMETHAZINE-DM) 6.25-15 MG/5ML syrup, Take 5 mL by mouth 4 (Four) Times a Day As Needed for Cough., Disp: 473 mL, Rfl: 0      Objective:     Vitals:    08/22/24 1331   Weight: 71.7 kg (158 lb)   Height: 165.1 cm (65\")     Body mass index is 26.29 kg/m².    2 week Zio 08/05/2024:    A relatively benign monitor study.    Brief SVT noted.     2D Echocardiogram 07/14/2024:    Left ventricular systolic function is normal. Left ventricular ejection fraction appears to be 61 - 65%.    Left ventricular diastolic function was normal.    Estimated right ventricular systolic pressure from tricuspid regurgitation is normal (<35 mmHg).    Mild dilation of the ascending aorta is present.  3.8 cm.    There is a small (<1cm) pericardial effusion. There is no evidence of cardiac tamponade.    Left Heart Cath 09/02/2017:  LEFT VENTRICULOGRAPHY: Not performed     CORONARY ANGIOGRAPHY:  Angiographically normal coronary arteries in a right dominant fashion    Stress perfusion study 9/1/2017:  Findings consistent with a normal ECG stress test.  Left ventricular ejection fraction is normal (Calculated EF = 65%).  Myocardial perfusion imaging indicates a small-sized, mildly severe area of ischemia located in the inferior wall.  Impressions are consistent with an intermediate risk study.     PHYSICAL EXAM:    Constitutional:       Appearance: Healthy appearance. Not in distress.   Neck:      Vascular: No JVR. JVD normal.   Pulmonary:      Effort: Pulmonary effort is normal.      Breath sounds: Normal breath sounds. No wheezing. No rhonchi. No rales.   Chest:      Chest wall: Not tender to palpatation.   Cardiovascular:      PMI at left midclavicular line. Normal rate. Regular rhythm. Normal S1. Normal S2.       Murmurs: There is no murmur.      No gallop.  No click. No rub.   Pulses:     Intact distal pulses. "   Edema:     Peripheral edema absent.   Abdominal:      General: Bowel sounds are normal.      Palpations: Abdomen is soft.      Tenderness: There is no abdominal tenderness.   Musculoskeletal: Normal range of motion.         General: No tenderness. Skin:     General: Skin is warm and dry.   Neurological:      General: No focal deficit present.      Mental Status: Alert and oriented to person, place and time.           ECG 12 Lead    Date/Time: 8/22/2024 2:18 PM  Performed by: Jacinta Velarde APRN    Authorized by: Jacinta Velarde APRN  Comparison: compared with previous ECG from 7/13/2024  Similar to previous ECG  Rhythm: sinus rhythm  Rate: normal  BPM: 68  Conduction: conduction normal    Clinical impression: normal ECG            Assessment:       Diagnosis Plan   1. Primary hypertension        2. Near syncope        3. Prediabetes          No orders of the defined types were placed in this encounter.         Plan:       Near syncopal episode: secondary to polio. Occurs about once a year  Hypertension: Well-controlled  Prediabetes: HbA1C 6.2.  Dietary modifications.  Hyperlipidemia goal LDL < 100: Recent , HDL 72    Ms. Henry will follow-up with Dr. Rosado in 1 year.  She will call sooner for any questions or concerns.       Your medication list            Accurate as of August 22, 2024  1:42 PM. If you have any questions, ask your nurse or doctor.                CONTINUE taking these medications        Instructions Last Dose Given Next Dose Due   benzonatate 100 MG capsule  Commonly known as: Tessalon Perles      Take 1 capsule by mouth 3 (Three) Times a Day As Needed for Cough.       dextromethorphan polistirex ER 30 MG/5ML Suspension Extended Release oral suspension  Commonly known as: DELSYM      Take 5 mL by mouth 2 (Two) Times a Day As Needed (cough).       fluticasone 50 MCG/ACT nasal spray  Commonly known as: FLONASE      2 sprays by Each Nare route Daily.       olmesartan 20 MG  tablet  Commonly known as: BENICAR      Take 1 tablet by mouth every night at bedtime.       pantoprazole 40 MG EC tablet  Commonly known as: PROTONIX      Take 1 tablet by mouth Daily.       promethazine-dextromethorphan 6.25-15 MG/5ML syrup  Commonly known as: PROMETHAZINE-DM      Take 5 mL by mouth 4 (Four) Times a Day As Needed for Cough.                  As always, it has been a pleasure to participate in your patient's care.      Sincerely,       JEFRY Wheatley

## 2024-08-29 ENCOUNTER — OFFICE VISIT (OUTPATIENT)
Dept: OTHER | Facility: HOSPITAL | Age: 74
End: 2024-08-29
Payer: MEDICARE

## 2024-08-29 VITALS
DIASTOLIC BLOOD PRESSURE: 73 MMHG | TEMPERATURE: 98.2 F | RESPIRATION RATE: 13 BRPM | HEART RATE: 74 BPM | SYSTOLIC BLOOD PRESSURE: 117 MMHG | OXYGEN SATURATION: 96 %

## 2024-08-29 DIAGNOSIS — R91.1 LUNG NODULE: Primary | ICD-10-CM

## 2024-08-29 PROCEDURE — 3078F DIAST BP <80 MM HG: CPT | Performed by: SURGERY

## 2024-08-29 PROCEDURE — 3074F SYST BP LT 130 MM HG: CPT | Performed by: SURGERY

## 2024-08-29 PROCEDURE — G0463 HOSPITAL OUTPT CLINIC VISIT: HCPCS

## 2024-09-05 ENCOUNTER — HOSPITAL ENCOUNTER (OUTPATIENT)
Dept: PET IMAGING | Facility: HOSPITAL | Age: 74
Discharge: HOME | End: 2024-09-05
Payer: MEDICARE

## 2024-09-05 DIAGNOSIS — R91.1 LUNG NODULE: ICD-10-CM

## 2024-09-05 LAB — GLUCOSE BLDC GLUCOMTR-MCNC: 108 MG/DL (ref 70–130)

## 2024-09-05 PROCEDURE — 78815 PET IMAGE W/CT SKULL-THIGH: CPT

## 2024-09-05 PROCEDURE — 71250 CT THORAX DX C-: CPT

## 2024-09-05 PROCEDURE — 0 FLUDEOXYGLUCOSE F18 SOLUTION: Performed by: SURGERY

## 2024-09-05 PROCEDURE — 82948 REAGENT STRIP/BLOOD GLUCOSE: CPT

## 2024-09-05 PROCEDURE — A9552 F18 FDG: HCPCS | Performed by: SURGERY

## 2024-09-05 RX ADMIN — FLUDEOXYGLUCOSE F 18 1 DOSE: 200 INJECTION, SOLUTION INTRAVENOUS at 12:33

## 2024-09-10 DIAGNOSIS — I10 HYPERTENSION, UNSPECIFIED TYPE: ICD-10-CM

## 2024-09-10 RX ORDER — OLMESARTAN MEDOXOMIL 20 MG/1
20 TABLET ORAL DAILY
Qty: 30 TABLET | Refills: 3 | Status: SHIPPED | OUTPATIENT
Start: 2024-09-10

## 2024-09-12 ENCOUNTER — OFFICE VISIT (OUTPATIENT)
Dept: OTHER | Facility: HOSPITAL | Age: 74
End: 2024-09-12
Payer: MEDICARE

## 2024-09-12 VITALS
TEMPERATURE: 98.2 F | SYSTOLIC BLOOD PRESSURE: 109 MMHG | HEART RATE: 85 BPM | RESPIRATION RATE: 13 BRPM | DIASTOLIC BLOOD PRESSURE: 63 MMHG | OXYGEN SATURATION: 94 %

## 2024-09-12 DIAGNOSIS — R91.1 LUNG NODULE: Primary | ICD-10-CM

## 2024-09-12 PROCEDURE — 3074F SYST BP LT 130 MM HG: CPT | Performed by: SURGERY

## 2024-09-12 PROCEDURE — 3078F DIAST BP <80 MM HG: CPT | Performed by: SURGERY

## 2024-09-12 PROCEDURE — G0463 HOSPITAL OUTPT CLINIC VISIT: HCPCS | Performed by: SURGERY

## 2024-10-21 ENCOUNTER — TELEPHONE (OUTPATIENT)
Dept: ORTHOPEDIC SURGERY | Facility: CLINIC | Age: 74
End: 2024-10-21
Payer: MEDICARE

## 2024-10-21 NOTE — TELEPHONE ENCOUNTER
Caller: Irlanda Henry    Relationship to patient: Self    Best call back number: [phone]    Patient is needing: PATIENT IS EST WITH DR SON FOR POST POLIO LIMB WEAKNESS AND SHE HAS A LONG LEG BRACE FOR RIGHT LEG AND THE FOOT PLATE HAS BROKEN.   PATIENT NEEDS A NEW FOOT PLATE MADE OR HAVE THE CURRENT ONE REPAIRED.   THE PATIENT NEEDS THIS FIXED AND NEEDS TO DR SON TO SEND AN ORDER IN. SHE WANTS TO KNOW WHERE THE ORDER IS BEING SENT TO FIRST. SHE HAS USED  IN THE PAST.   PATIENT IS UNABLE TO WALK WITH THIS BRACE ISSUE AND NEEDS ASSISTANCE ASAP   PLEASE ADVISE

## 2024-10-22 DIAGNOSIS — B91 POST-POLIO LIMB MUSCLE WEAKNESS: Primary | Chronic | ICD-10-CM

## 2024-10-22 DIAGNOSIS — M62.81 POST-POLIO LIMB MUSCLE WEAKNESS: Primary | Chronic | ICD-10-CM

## 2024-11-15 ENCOUNTER — TELEPHONE (OUTPATIENT)
Dept: ORTHOPEDIC SURGERY | Facility: CLINIC | Age: 74
End: 2024-11-15

## 2024-11-15 DIAGNOSIS — M62.81 POST-POLIO LIMB MUSCLE WEAKNESS: Primary | ICD-10-CM

## 2024-11-15 DIAGNOSIS — B91 POST-POLIO LIMB MUSCLE WEAKNESS: Primary | ICD-10-CM

## 2024-11-15 NOTE — TELEPHONE ENCOUNTER
Caller: Irlanda Henry    Relationship: Self    Best call back number:  - CAN LEAVE VOICEMAIL    Equipment requested: PRESCRIPTION TO HAVE THE TOP BAND AND KNEE PAD RE-LEATHERED ON HER OLD BRACE    A PRESCRIPTION FOR A RIGHT LONG LEG BRACE    Reason for the request: HER OLD BRACE BROKE A COUPLE WEEKS AGO    Prescribing Provider: DR SON

## 2024-11-25 NOTE — TELEPHONE ENCOUNTER
Caller: Irlanda Henry    Relationship: Self    Best call back number: [phone]    What was the call regarding: PT CALLING FOR AN UPDATE REGARDING THIS REQUEST. PT STATES THAT [facility] HAS NOT RECEIVED THE RX YET. PLEASE CONTACT PT WITH ANY UPDATES.

## 2024-12-10 ENCOUNTER — HOSPITAL ENCOUNTER (OUTPATIENT)
Dept: CT IMAGING | Facility: HOSPITAL | Age: 74
Discharge: HOME | End: 2024-12-10
Admitting: SURGERY
Payer: MEDICARE

## 2024-12-10 DIAGNOSIS — R91.1 LUNG NODULE: ICD-10-CM

## 2024-12-10 PROCEDURE — 71250 CT THORAX DX C-: CPT

## 2024-12-12 ENCOUNTER — OFFICE VISIT (OUTPATIENT)
Dept: OTHER | Facility: HOSPITAL | Age: 74
End: 2024-12-12
Payer: MEDICARE

## 2024-12-12 VITALS — HEART RATE: 70 BPM | OXYGEN SATURATION: 96 % | SYSTOLIC BLOOD PRESSURE: 124 MMHG | DIASTOLIC BLOOD PRESSURE: 71 MMHG

## 2024-12-12 DIAGNOSIS — R91.1 LUNG NODULE: Primary | ICD-10-CM

## 2024-12-12 PROCEDURE — 3078F DIAST BP <80 MM HG: CPT | Performed by: SURGERY

## 2024-12-12 PROCEDURE — 99214 OFFICE O/P EST MOD 30 MIN: CPT | Performed by: SURGERY

## 2024-12-12 PROCEDURE — 3074F SYST BP LT 130 MM HG: CPT | Performed by: SURGERY

## 2025-01-15 DIAGNOSIS — I10 HYPERTENSION, UNSPECIFIED TYPE: ICD-10-CM

## 2025-01-16 RX ORDER — OLMESARTAN MEDOXOMIL 20 MG/1
20 TABLET ORAL DAILY
Qty: 30 TABLET | Refills: 3 | Status: SHIPPED | OUTPATIENT
Start: 2025-01-16

## 2025-01-27 NOTE — PROGRESS NOTES
THORACIC SURGERY CLINIC CONSULT NOTE    REASON FOR CONSULT: Pulmonary nodule     Subjective   HISTORY OF PRESENTING ILLNESS:   Irlanda Henry is a 74 y.o. female who has significant medical problems as mentioned in the medical chart.     History of Present Illness  Several months ago, she was hospitalized for a colonoscopy. During this time, a CT scan revealed a nodule in the lower part of her lung. She was advised to return for a follow-up check. An ultrasound also detected another nodule. She has never smoked but has been exposed to secondhand smoke at home intermittently. She has no history of cancer.     A month ago, she experienced bronchitis, COVID-19, and sinusitis, which resulted in temporary loss of speech. Her voice has not fully recovered since then. She occasionally experiences acid reflux but does not report any regurgitation of food. She has no history of chest surgery, heart attack, stroke, or kidney or liver problems.     An MRI of the brain was performed due to worsening vasovagal spells, with a severe episode occurring about a month ago. This led to a hospital visit where her cardiologist ruled out a stroke as the cause of these spells. She does not use a wheelchair constantly.     She has polio in her right leg and bone-on-bone contact in her left knee. Surgery was not recommended due to her polio, which increases her risk of immobility, infection, and blood clots. She has four pins in her left knee from childhood to halt leg growth and allow the polio to catch up. The pins remain and are believed to be causing issues. A second consultation suggested the removal of two pins, but the potential benefits were outweighed by the risks and recovery time.    She reports no significant changes in her health status over the past 3 months. She was previously informed of the presence of 2 nodules, one located inferiorly and the other superiorly. She does not smoke.    At the time of initial evaluation, I  recommended PET CT scan which was performed on 9/12/2024 and reported decrease in size of the left lower lobe consolidation.  She was advised to follow-up in 3 months with repeat CT scan of the chest.  Repeat CT scan of the chest was performed 12/10/2024 and reported near complete resolution of the left lower lobe pneumonia.    Past Medical History:   Diagnosis Date    Cataract     Encounter for diagnostic colonoscopy due to change in bowel habits 06/21/2023    Polio     Vasovagal syncope        Past Surgical History:   Procedure Laterality Date    CARDIAC CATHETERIZATION N/A 09/02/2017    Procedure: Left Heart Cath;  Surgeon: Jose Murphy MD;  Location:  [facility];  Service:     CARDIAC CATHETERIZATION N/A 09/02/2017    Procedure: Coronary angiography;  Surgeon: Jose Murphy MD;  Location:  [facility];  Service:     CARPAL TUNNEL RELEASE      CATARACT EXTRACTION      CHOLECYSTECTOMY      COLONOSCOPY      COLONOSCOPY N/A 6/22/2023    Procedure: COLONOSCOPY to cecum;  Surgeon: Patricio Ulloa MD;  Location: [facility] ENDOSCOPY;  Service: Gastroenterology;  Laterality: N/A;  PRE - abd pain  POST - diverticulosis    LEG SURGERY      multiple surgeries for polio    RETINAL DETACHMENT REPAIR Left        Family History   Problem Relation Age of Onset    Breast cancer Maternal Aunt     Heart attack Mother         in 90s     Kidney failure Father        Social History     Socioeconomic History    Marital status: Legally    Tobacco Use    Smoking status: Never    Smokeless tobacco: Never   Vaping Use    Vaping status: Never Used   Substance and Sexual Activity    Alcohol use: Yes     Comment: rare    Drug use: No    Sexual activity: Not Currently     Birth control/protection: None         Current Outpatient Medications:     benzonatate (Tessalon Perles) 100 MG capsule, Take 1 capsule by mouth 3 (Three) Times a Day As Needed for Cough. (Patient not taking: Reported on  12/12/2024), Disp: 30 capsule, Rfl: 0    olmesartan (BENICAR) 20 MG tablet, TAKE 1 TABLET BY MOUTH DAILY, Disp: 30 tablet, Rfl: 3    promethazine-dextromethorphan (PROMETHAZINE-DM) 6.25-15 MG/5ML syrup, Take 5 mL by mouth 4 (Four) Times a Day As Needed for Cough. (Patient not taking: Reported on 12/12/2024), Disp: 473 mL, Rfl: 0     Allergies   Allergen Reactions    Ct Contrast Nausea And Vomiting             Objective    OBJECTIVE:     VITAL SIGNS:  /71   Pulse 70   SpO2 96%     PHYSICAL EXAM:  Normal appearance.   Head is normocephalic.   Nose appears normal.   No obvious deformity of the mouth and throat.  Conjunctivae normal.   Heart rate and rhythm is normal.  Pulmonary effort is normal.   Moving all 4 extremities.  Extremities warm.  No focal deficit present.   Alert and oriented to person, place, and time.     RESULTS REVIEW:  I have reviewed the patient's all relevant laboratory and imaging findings.     Assessment & Plan    ASSESSMENT & PLAN:  Irlanda Henry is a 74 y.o. female with significant medical conditions as mentioned above presented to my clinic.    Assessment & Plan  1. Pulmonary nodules.  The recent CT scan from December 2024 shows that the previously noted consolidation in the lower part of the left lung has completely resolved, likely indicating it was due to inflammation or infection. A follow-up CT scan will be scheduled in 6 months to ensure continued stability. She will be contacted if any concerning findings are noted by the radiologist, which may necessitate an earlier scan. She is advised to abstain from smoking.    Follow-up  The patient will follow up in 6 months.    I discussed the patients findings and my recommendations with the patient/family/caregiver. The patient/family/caregiver was given adequate time to ask questions and all questions were answered to patient satisfaction. Thank you for this consult and allowing us to participate in the care of your patient.       Mateus Hernandez MD  Thoracic Surgeon  [facility]        Dictated utilizing Dragon dictation    I spent 30 minutes caring for Irlanda on this date of service. This time includes time spent by me in the following activities:preparing for the visit, reviewing tests, obtaining and/or reviewing a separately obtained history, performing a medically appropriate examination and/or evaluation, counseling and educating the patient/family/caregiver, ordering medications, tests, or procedures, referring and communicating with other health care professionals , documenting information in the medical record, independently interpreting results and communicating that information with the patient/family/caregiver, and care coordination and more than half the time was spent in direct face to face evaluation and decision making.     Patient or patient representative verbalized consent for the use of Ambient Listening during the visit with  Mateus Hernandez MD for chart documentation. 1/27/2025  07:42 EST

## 2025-04-21 DIAGNOSIS — I10 HYPERTENSION, UNSPECIFIED TYPE: ICD-10-CM

## 2025-04-21 NOTE — TELEPHONE ENCOUNTER
Caller: Carl Irlanda L    Relationship: Self    Best call back number: [phone]     Requested Prescriptions:   Requested Prescriptions     Pending Prescriptions Disp Refills    olmesartan (BENICAR) 20 MG tablet [Pharmacy Med Name: OLMESARTAN MEDOXOMIL 20 MG TAB] 90 tablet      Sig: TAKE 1 TABLET BY MOUTH DAILY        Pharmacy where request should be sent: [facility] [id number] [place] [address] [address] [facility] [address] [facility] [phone] [facility] [phone] FX     Last office visit with prescribing clinician: 7/26/2024   Last telemedicine visit with prescribing clinician: Visit date not found   Next office visit with prescribing clinician: Visit date not found     Additional details provided by patient: PLEASE SEND 90 DAY SUPPLY WITH REFILLS     Does the patient have less than a 3 day supply:  [x] Yes  [] No    Would you like a call back once the refill request has been completed: [] Yes [x] No    If the office needs to give you a call back, can they leave a voicemail: [x] Yes [] No    Kedar Keith Rep   04/21/25 13:42 EDT

## 2025-04-22 RX ORDER — OLMESARTAN MEDOXOMIL 20 MG/1
20 TABLET ORAL DAILY
Qty: 90 TABLET | OUTPATIENT
Start: 2025-04-22

## 2025-04-23 DIAGNOSIS — I10 HYPERTENSION, UNSPECIFIED TYPE: ICD-10-CM

## 2025-04-23 RX ORDER — OLMESARTAN MEDOXOMIL 20 MG/1
20 TABLET ORAL DAILY
Qty: 90 TABLET | Refills: 0 | Status: SHIPPED | OUTPATIENT
Start: 2025-04-23

## 2025-06-06 ENCOUNTER — TELEPHONE (OUTPATIENT)
Dept: SURGERY | Facility: CLINIC | Age: 75
End: 2025-06-06

## 2025-06-06 NOTE — TELEPHONE ENCOUNTER
Caller: Irlanda Henry    Relationship to patient: Self    Best call back number: [phone]    Chief complaint: PT WANTS TO SHIFT APPT TO TELEPHONE VISIT IF POSSIBLE

## 2025-06-16 ENCOUNTER — HOSPITAL ENCOUNTER (OUTPATIENT)
Dept: CT IMAGING | Facility: HOSPITAL | Age: 75
Discharge: HOME | End: 2025-06-16
Admitting: SURGERY
Payer: MEDICARE

## 2025-06-16 DIAGNOSIS — R91.1 LUNG NODULE: ICD-10-CM

## 2025-06-16 PROCEDURE — 71250 CT THORAX DX C-: CPT

## 2025-06-19 ENCOUNTER — TELEMEDICINE (OUTPATIENT)
Dept: SURGERY | Facility: CLINIC | Age: 75
End: 2025-06-19
Payer: MEDICARE

## 2025-06-19 DIAGNOSIS — R91.1 LUNG NODULE: Primary | ICD-10-CM

## 2025-06-19 DIAGNOSIS — Z77.22 SECOND HAND SMOKE EXPOSURE: ICD-10-CM

## 2025-06-19 PROCEDURE — 1159F MED LIST DOCD IN RCRD: CPT | Performed by: NURSE PRACTITIONER

## 2025-06-19 PROCEDURE — 99212 OFFICE O/P EST SF 10 MIN: CPT | Performed by: NURSE PRACTITIONER

## 2025-06-19 PROCEDURE — 1160F RVW MEDS BY RX/DR IN RCRD: CPT | Performed by: NURSE PRACTITIONER

## 2025-06-19 NOTE — PROGRESS NOTES
"Chief Complaint  No chief complaint on file.    Subjective        Irlanda Henry presents to [facility] THORACIC SURGERY  History of Present Illness    Ms. Henry is a pleasant 74-year-old lady who presents today for continued surveillance of sub-5 mm pulmonary nodules.  She is a never smoker, but has had exposure to secondhand smoke in the past.  She has been established with our practice with Dr. Hernandez since September 2024, when she had a pneumonia.  She has no new pulmonary complaints.  She presents with CT chest.    Objective   Vital Signs:  There were no vitals taken for this visit.  Estimated body mass index is 26.29 kg/m² as calculated from the following:    Height as of 8/22/24: 165.1 cm (65\").    Weight as of 8/22/24: 71.7 kg (158 lb).            Physical Exam  Constitutional:       Appearance: Normal appearance.   HENT:      Head: Normocephalic and atraumatic.      Nose: Nose normal.      Mouth/Throat:      Mouth: Mucous membranes are moist.   Musculoskeletal:         General: Normal range of motion.      Cervical back: Normal range of motion and neck supple.   Neurological:      General: No focal deficit present.      Mental Status: She is alert. Mental status is at baseline.   Psychiatric:         Mood and Affect: Mood normal.         Thought Content: Thought content normal.        Result Review :           CT chest 6/16/2025: Sub-5 mm nodules without change.  No new or enlarging pulmonary nodule.  There is a 2.3 cm low-density thyroid nodule within the right lobe, unchanged.  Ascending thoracic aorta measures 3.8 cm.  No mediastinal or hilar lymphadenopathy.    PET/CT 9/5/2024 consolidation in the left lower lobe is improved, suspect resolving infection. Long segment of relative wall thickening involving the left hemicolon which is also underdistended. While findings may related  decompression, correlation with patient history is recommended to  exclude subtle colitis.         "   Assessment and Plan   Diagnoses and all orders for this visit:    1. Lung nodule (Primary)  -     CT Chest Without Contrast; Future    2. Second hand smoke exposure  -     CT Chest Without Contrast; Future    Patient is a pleasant 74-year-old lady under surveillance of multiple pulmonary nodules that were detected after a bout of pneumonia.  Her most recent CT scan demonstrates improvement and unchanged sub-5 mm pulmonary nodules.  I recommend 1 more CT chest in 1 year for documented stability.  This was discussed with her today and she is in agreement with the plan.       I spent 21 minutes caring for Irlanda on this date of service. This time includes time spent by me in the following activities:preparing for the visit, reviewing tests, obtaining and/or reviewing a separately obtained history, performing a medically appropriate examination and/or evaluation , counseling and educating the patient/family/caregiver, ordering medications, tests, or procedures, referring and communicating with other health care professionals , documenting information in the medical record, independently interpreting results and communicating that information with the patient/family/caregiver, and care coordination  Follow Up   No follow-ups on file.  Patient was given instructions and counseling regarding her condition or for health maintenance advice. Please see specific information pulled into the AVS if appropriate.     Mode of Visit: Video  Location of patient: -HOME-  Location of provider: [facility]+  You have chosen to receive care through a telehealth visit.  The patient has signed the video visit consent form.  The visit included audio and video interaction. No technical issues occurred during this visit.

## 2025-07-01 ENCOUNTER — OFFICE VISIT (OUTPATIENT)
Dept: INTERNAL MEDICINE | Facility: CLINIC | Age: 75
End: 2025-07-01
Payer: MEDICARE

## 2025-07-01 VITALS
TEMPERATURE: 96.6 F | HEART RATE: 65 BPM | WEIGHT: 168 LBS | HEIGHT: 65 IN | SYSTOLIC BLOOD PRESSURE: 130 MMHG | BODY MASS INDEX: 27.99 KG/M2 | DIASTOLIC BLOOD PRESSURE: 70 MMHG | OXYGEN SATURATION: 97 %

## 2025-07-01 DIAGNOSIS — Z87.898 HISTORY OF SOLITARY PULMONARY NODULE: Chronic | ICD-10-CM

## 2025-07-01 DIAGNOSIS — I10 PRIMARY HYPERTENSION: Chronic | ICD-10-CM

## 2025-07-01 DIAGNOSIS — Z00.00 MEDICARE ANNUAL WELLNESS VISIT, SUBSEQUENT: Primary | ICD-10-CM

## 2025-07-01 DIAGNOSIS — Z13.6 ENCOUNTER FOR SCREENING FOR CARDIOVASCULAR DISORDERS: ICD-10-CM

## 2025-07-01 DIAGNOSIS — R73.03 PREDIABETES: Chronic | ICD-10-CM

## 2025-07-01 DIAGNOSIS — M62.81 POST-POLIO LIMB MUSCLE WEAKNESS: Chronic | ICD-10-CM

## 2025-07-01 DIAGNOSIS — Z13.29 SCREENING FOR THYROID DISORDER: ICD-10-CM

## 2025-07-01 DIAGNOSIS — D17.20 LIPOMA OF UPPER EXTREMITY, UNSPECIFIED LATERALITY: Chronic | ICD-10-CM

## 2025-07-01 DIAGNOSIS — Z79.899 MEDICATION MANAGEMENT: ICD-10-CM

## 2025-07-01 DIAGNOSIS — B91 POST-POLIO LIMB MUSCLE WEAKNESS: Chronic | ICD-10-CM

## 2025-07-01 PROCEDURE — 3075F SYST BP GE 130 - 139MM HG: CPT

## 2025-07-01 PROCEDURE — 1126F AMNT PAIN NOTED NONE PRSNT: CPT

## 2025-07-01 PROCEDURE — G0439 PPPS, SUBSEQ VISIT: HCPCS

## 2025-07-01 PROCEDURE — 99214 OFFICE O/P EST MOD 30 MIN: CPT

## 2025-07-01 PROCEDURE — 1160F RVW MEDS BY RX/DR IN RCRD: CPT

## 2025-07-01 PROCEDURE — 3078F DIAST BP <80 MM HG: CPT

## 2025-07-01 PROCEDURE — 1159F MED LIST DOCD IN RCRD: CPT

## 2025-07-01 NOTE — PROGRESS NOTES
Subjective   The ABCs of the Annual Wellness Visit  Medicare Wellness Visit      Irlanda Henry is a 74 y.o. patient who presents for a Medicare Wellness Visit.    The following portions of the patient's history were reviewed and   updated as appropriate: allergies, current medications, past family history, past medical history, past social history, past surgical history, and problem list.    Compared to one year ago, the patient's physical   health is the same.  Compared to one year ago, the patient's mental   health is the same.    Recent Hospitalizations:  This patient has had a [facility] admission record on file within the last 365 days.  Current Medical Providers:  Patient Care Team:  Cris Rene APRN as PCP - General (Family Medicine)  Patricio Ulloa MD as Consulting Physician (Gastroenterology)  David Mcgill MD as Surgeon (Orthopedic Surgery)    Outpatient Medications Prior to Visit   Medication Sig Dispense Refill    olmesartan (BENICAR) 20 MG tablet Take 1 tablet by mouth Daily. 90 tablet 0    benzonatate (Tessalon Perles) 100 MG capsule Take 1 capsule by mouth 3 (Three) Times a Day As Needed for Cough. 30 capsule 0    promethazine-dextromethorphan (PROMETHAZINE-DM) 6.25-15 MG/5ML syrup Take 5 mL by mouth 4 (Four) Times a Day As Needed for Cough. (Patient not taking: Reported on 12/12/2024) 473 mL 0     No facility-administered medications prior to visit.     No opioid medication identified on active medication list. I have reviewed chart for other potential  high risk medication/s and harmful drug interactions in the elderly.      Aspirin is not on active medication list.  Aspirin use is contraindicated for this patient due to: patient dose not want to take any extra meds.  .    Patient Active Problem List   Diagnosis    Chest pain    Post-polio limb muscle weakness    Gastroesophageal reflux disease with esophagitis    Allergic arthritis of right hip    Gout of hand    Chronic  "fatigue    Avitaminosis D    Nodular goiter, non-toxic    Thyroiditis    Polio    Lower abdominal pain    Immobility    Annual physical exam    Vulvar itching    Abnormal CT of the abdomen    Near syncope    Hypertension    History of solitary pulmonary nodule    History of vitamin D deficiency    Family history of diabetes mellitus    Vasovagal syncope    Prediabetes    Medicare annual wellness visit, subsequent     Advance Care Planning Advance Directive is not on file.  ACP discussion was declined by the patient. Patient has an advance directive (not in EMR), copy requested.        Objective   Vitals:    07/01/25 1307   BP: 130/70   BP Location: Left arm   Patient Position: Sitting   Cuff Size: Adult   Pulse: 65   Temp: 96.6 °F (35.9 °C)   TempSrc: Temporal   SpO2: 97%   Weight: 76.2 kg (168 lb)   Height: 165.1 cm (65\")   PainSc: 0-No pain       Estimated body mass index is 27.96 kg/m² as calculated from the following:    Height as of this encounter: 165.1 cm (65\").    Weight as of this encounter: 76.2 kg (168 lb).    BMI is >= 25 and <30. (Overweight) The following options were offered after discussion;: weight loss educational material (shared in after visit summary), exercise counseling/recommendations, nutrition counseling/recommendations, and information on healthy weight added to patient's after visit summary            Does the patient have evidence of cognitive impairment? No  Lab Results   Component Value Date    CHLPL 188 07/01/2025    TRIG 53 07/01/2025    HDL 71 (H) 07/01/2025     (H) 07/01/2025    VLDL 10 07/01/2025    HGBA1C 5.70 (H) 07/01/2025                                                                                                Health  Risk Assessment    Smoking Status:  Social History     Tobacco Use   Smoking Status Never   Smokeless Tobacco Never     Alcohol Consumption:  Social History     Substance and Sexual Activity   Alcohol Use Yes    Comment: rare       Fall Risk " Screen  STEADI Fall Risk Assessment was completed, and patient is at LOW risk for falls.Assessment completed on:2025    Depression Screening   Little interest or pleasure in doing things? Not at all   Feeling down, depressed, or hopeless? Not at all   PHQ-2 Total Score 0      Health Habits and Functional and Cognitive Screenin/29/2025    11:12 AM   Functional & Cognitive Status   Do you have difficulty preparing food and eating? No   Do you have difficulty bathing yourself, getting dressed or grooming yourself? No   Do you have difficulty using the toilet? No   Do you have difficulty moving around from place to place? No   Do you have trouble with steps or getting out of a bed or a chair? Yes   Current Diet Well Balanced Diet   Dental Exam Not up to date   Eye Exam Up to date   Exercise (times per week) 2 times per week   Current Exercises Include No Regular Exercise   Do you need help using the phone?  No   Are you deaf or do you have serious difficulty hearing?  No   Do you need help to go to places out of walking distance? Yes   Do you need help shopping? No   Do you need help preparing meals?  No   Do you need help with housework?  No   Do you need help with laundry? No   Do you need help taking your medications? No   Do you need help managing money? No   Do you ever drive or ride in a car without wearing a seat belt? No   Have you felt unusual fatigue (could be tiredness), stress, anger or loneliness in the last month? No   Who do you live with? Alone   If you need help, do you have trouble finding someone available to you? No   Have you been bothered in the last four weeks by sexual problems? No   Do you have difficulty concentrating, remembering or making decisions? No           Age-appropriate Screening Schedule:  Refer to the list below for future screening recommendations based on patient's age, sex and/or medical conditions. Orders for these recommended tests are listed in the plan section.  The patient has been provided with a written plan.    Health Maintenance List  Health Maintenance   Topic Date Due    MAMMOGRAM  10/17/2025    ANNUAL WELLNESS VISIT  07/01/2026    DXA SCAN  08/06/2026    HEPATITIS C SCREENING  Discontinued    COVID-19 Vaccine  Discontinued    INFLUENZA VACCINE  Discontinued    Pneumococcal Vaccine 50+  Discontinued    TDAP/TD VACCINES  Discontinued    ZOSTER VACCINE  Discontinued    COLORECTAL CANCER SCREENING  Discontinued                                                                                                                                                CMS Preventative Services Quick Reference  Risk Factors Identified During Encounter  Fall Risk-High or Moderate: Discussed Fall Prevention in the home, Information on Fall Prevention Shared in After Visit Summary, and Sit to Stand Exercise Information Shared in After Visit Summary  Vision Screening Recommended    The above risks/problems have been discussed with the patient.  Pertinent information has been shared with the patient in the After Visit Summary.  An After Visit Summary and PPPS were made available to the patient.    Follow Up:   Next Medicare Wellness visit to be scheduled in 1 year.         Additional E&M Note during same encounter follows:  Patient has additional, significant, and separately identifiable condition(s)/problem(s) that require work above and beyond the Medicare Wellness Visit     Chief Complaint  Medicare Wellness-subsequent and New Med Request (Wants to talk about supplements )    Subjective    HPI  Irlanda is also being seen today for additional medical problem/s.       The patient presents for a routine checkup.    She reports no significant changes in her physical or mental health since her last visit. She does not take aspirin daily and has an advanced directive in place. She has not experienced any falls in the past year and does not struggle with mobility, although she uses a walker for  "support. She has not engaged in physical therapy recently due to her history of polio. She is currently on blood pressure medication and is curious about the possibility of discontinuing it. She maintains a healthy diet and avoids junk food. She has declined vaccinations for COVID-19, influenza, and pneumonia.    She has been unable to find Os-Elbert at her local pharmacy and is considering taking calcium gummies instead. She is also contemplating the addition of vitamin C to her daily regimen. She has not used any of the medications prescribed during her last visit when she was ill with COVID-19 and bronchitis.    She has noticed the development of skin tags on her arms and is interested in having them removed.    She has been diagnosed with lung nodules and is under the care of Dr. Hodges. A recent scan showed no changes in the size of the nodules, but she remains concerned about one of them.    She had a bone density test done last year.          Objective   Vital Signs:  /70 (BP Location: Left arm, Patient Position: Sitting, Cuff Size: Adult)   Pulse 65   Temp 96.6 °F (35.9 °C) (Temporal)   Ht 165.1 cm (65\")   Wt 76.2 kg (168 lb)   SpO2 97%   BMI 27.96 kg/m²   Physical Exam  Vitals reviewed.   Constitutional:       General: She is awake. She is not in acute distress.     Appearance: Normal appearance. She is well-groomed. She is not ill-appearing, toxic-appearing or diaphoretic.   HENT:      Head: Normocephalic.      Right Ear: Hearing normal.      Left Ear: Hearing normal.      Nose: Nose normal.      Mouth/Throat:      Lips: Pink.      Mouth: Mucous membranes are moist.   Eyes:      General: Lids are normal. Vision grossly intact.      Conjunctiva/sclera: Conjunctivae normal.   Cardiovascular:      Rate and Rhythm: Normal rate and regular rhythm.   Pulmonary:      Effort: Pulmonary effort is normal.      Breath sounds: Normal breath sounds.   Abdominal:      General: Bowel sounds are normal.   Skin:     " General: Skin is warm and dry.      Capillary Refill: Capillary refill takes less than 2 seconds.   Neurological:      General: No focal deficit present.      Mental Status: She is alert and oriented to person, place, and time. Mental status is at baseline.      Motor: Weakness present.      Coordination: Coordination is intact.      Gait: Gait abnormal (wheelchair in use).   Psychiatric:         Attention and Perception: Attention and perception normal.         Mood and Affect: Mood and affect normal.         Speech: Speech normal.         Behavior: Behavior normal. Behavior is cooperative.         Thought Content: Thought content normal.         Cognition and Memory: Cognition and memory normal.         Judgment: Judgment normal.           General: No acute distress, well-nourished.  Extremities: No leg swelling.  Skin: Multiple fatty lipomas on arms.    The following data was reviewed by: JEFRY Pedro on 07/01/2025:    CMP          7/1/2025    13:53   CMP   Glucose 91    BUN 16.0    Creatinine 0.85    EGFR 72.4    Sodium 141    Potassium 5.0    Chloride 105    Calcium 9.4    Total Protein 6.1    Albumin 4.2    Globulin 1.9    Total Bilirubin 0.4    Alkaline Phosphatase 38    AST (SGOT) 20    ALT (SGPT) 13    Albumin/Globulin Ratio 2.2    BUN/Creatinine Ratio 18.8      CBC          7/1/2025    13:53   CBC   WBC 3.64    RBC 4.42    Hemoglobin 13.4    Hematocrit 42.1    MCV 95.2    MCH 30.3    MCHC 31.8    RDW 13.1    Platelets 194      CBC w/diff          7/13/2024    14:17 7/14/2024    03:10 7/15/2024    05:55   CBC w/Diff   WBC 6.19  6.16  4.88    RBC 4.23  3.82  3.72    Hemoglobin 12.6  11.4  11.3    Hematocrit 38.7  35.0  33.7    MCV 91.5  91.6  90.6    MCH 29.8  29.8  30.4    MCHC 32.6  32.6  33.5    RDW 12.7  12.8  12.7    Platelets 141  153  137    Neutrophil Rel % 83.7   62.7    Immature Granulocyte Rel % 1.3   0.4    Lymphocyte Rel % 7.8   23.2    Monocyte Rel % 5.3   9.4    Eosinophil Rel % 1.3    3.5    Basophil Rel % 0.6   0.8      Lipid Panel          7/1/2025    13:53   Lipid Panel   Total Cholesterol 188    Triglycerides 53    HDL Cholesterol 71    VLDL Cholesterol 10    LDL Cholesterol  107      TSH          7/1/2025    13:53   TSH   TSH 3.410      A1C Last 3 Results          7/1/2025    13:53   HGBA1C Last 3 Results   Hemoglobin A1C 5.70      Microalbumin          7/1/2025    13:53   Microalbumin   Microalbumin, Urine <3.0        Results  Imaging   - Imaging of lung nodules: No changes on the 5 mm nodule           Assessment and Plan Additional age appropriate preventative wellness advice topics were discussed during today's preventative wellness exam(some topics already addressed during AWV portion of the note above):    Physical Activity: Advised cardiovascular activity 150 minutes per week as tolerated. (example brisk walk for 30 minutes, 5 days a week).     Nutrition: Discussed nutrition plan with patient. Information shared in after visit summary. Goal is for a well balanced diet to enhance overall health.     Healthy Weight: Discussed current and goal BMI with patient. Steps to attain this goal discussed. Information shared in after visit summary.     Injury Prevention Discussion:  Information shared in after visit summary.             1. Routine checkup.  - Counseled on the importance of having an advanced directive or living will on file; advised to bring it in for documentation.  - Advised to continue current regimen of calcium gummies, vitamin D3, and vitamin C.  - Discussed potential benefits of a multivitamin; encouraged to consider its addition to daily routine.  - Lab work will be conducted today; results will be communicated.    2. Skin tags.  - Informed that skin tags are benign and do not require removal unless causing discomfort or cosmetic concerns.  - Advised to consult a dermatologist for removal if desired.    3. Lung nodules.  - Recent scan showed no changes in the 5 mm  nodule.  - Reassured that the nodules are being monitored closely.    Follow-up  - A follow-up visit is scheduled for 6 months from now.     Please complete your lab work today. Following review of results our office will be in contact with you for follow up care, medication changes or further instructions if needed. At that time if we need to schedule a follow up appointment one will be made at the time of the call.    Thank you for allowing us to care for you,  JEFRY Pedro        Follow Up   Return in about 7 months (around 1/26/2026) for Recheck.  Patient was given instructions and counseling regarding her condition or for health maintenance advice. Please see specific information pulled into the AVS if appropriate.  Patient or patient representative verbalized consent for the use of Ambient Listening during the visit with  JEFRY Pedro for chart documentation. 7/14/2025  13:33 EDT

## 2025-07-01 NOTE — ASSESSMENT & PLAN NOTE
Current recommendations according to the current Physical Activity Guidelines for Americans: adults need 150-300 minutes of physical exercise weekly. It is also recommended to perform two sessions of full body strength training exercise weekly which includes all major muscle groups including legs, hips, back, abdomen, chest, shoulders, and arms.     Current CDC recommendations for diet include following a diet that emphasizes fruits, vegetables, whole grains that is low in saturated fats and low in sugar intake.   Adults should consume at least 3 cup equivalents of fruit and vegetables daily. It is also beneficial to get 25 grams of fiber daily unless told otherwise by your healthcare provider    Anticipatory guidance given regarding health prevention/wellness, diet/exercise, tobacco/alcohol/drug education, exercise and wellbeing, covid 19 guidance, vaccination recommendations, and sexual health/STD education.   Recommended bi-yearly dental exams and regular vision examinations.

## 2025-07-02 LAB
ALBUMIN SERPL-MCNC: 4.2 G/DL (ref 3.5–5.2)
ALBUMIN/CREAT UR: <8 MG/G CREAT (ref 0–29)
ALBUMIN/GLOB SERPL: 2.2 G/DL
ALP SERPL-CCNC: 38 U/L (ref 39–117)
ALT SERPL-CCNC: 13 U/L (ref 1–33)
AST SERPL-CCNC: 20 U/L (ref 1–32)
BASOPHILS # BLD AUTO: 0.04 10*3/MM3 (ref 0–0.2)
BASOPHILS NFR BLD AUTO: 1.1 % (ref 0–1.5)
BILIRUB SERPL-MCNC: 0.4 MG/DL (ref 0–1.2)
BUN SERPL-MCNC: 16 MG/DL (ref 8–23)
BUN/CREAT SERPL: 18.8 (ref 7–25)
CALCIUM SERPL-MCNC: 9.4 MG/DL (ref 8.6–10.5)
CHLORIDE SERPL-SCNC: 105 MMOL/L (ref 98–107)
CHOLEST SERPL-MCNC: 188 MG/DL (ref 0–200)
CO2 SERPL-SCNC: 25.1 MMOL/L (ref 22–29)
CREAT SERPL-MCNC: 0.85 MG/DL (ref 0.57–1)
CREAT UR-MCNC: 39.5 MG/DL
EGFRCR SERPLBLD CKD-EPI 2021: 72.4 ML/MIN/1.73
EOSINOPHIL # BLD AUTO: 0.06 10*3/MM3 (ref 0–0.4)
EOSINOPHIL NFR BLD AUTO: 1.6 % (ref 0.3–6.2)
ERYTHROCYTE [DISTWIDTH] IN BLOOD BY AUTOMATED COUNT: 13.1 % (ref 12.3–15.4)
GLOBULIN SER CALC-MCNC: 1.9 GM/DL
GLUCOSE SERPL-MCNC: 91 MG/DL (ref 65–99)
HBA1C MFR BLD: 5.7 % (ref 4.8–5.6)
HCT VFR BLD AUTO: 42.1 % (ref 34–46.6)
HDLC SERPL-MCNC: 71 MG/DL (ref 40–60)
HGB BLD-MCNC: 13.4 G/DL (ref 12–15.9)
IMM GRANULOCYTES # BLD AUTO: 0.01 10*3/MM3 (ref 0–0.05)
IMM GRANULOCYTES NFR BLD AUTO: 0.3 % (ref 0–0.5)
LDLC SERPL CALC-MCNC: 107 MG/DL (ref 0–100)
LYMPHOCYTES # BLD AUTO: 0.65 10*3/MM3 (ref 0.7–3.1)
LYMPHOCYTES NFR BLD AUTO: 17.9 % (ref 19.6–45.3)
MCH RBC QN AUTO: 30.3 PG (ref 26.6–33)
MCHC RBC AUTO-ENTMCNC: 31.8 G/DL (ref 31.5–35.7)
MCV RBC AUTO: 95.2 FL (ref 79–97)
MICROALBUMIN UR-MCNC: <3 UG/ML
MONOCYTES # BLD AUTO: 0.35 10*3/MM3 (ref 0.1–0.9)
MONOCYTES NFR BLD AUTO: 9.6 % (ref 5–12)
NEUTROPHILS # BLD AUTO: 2.53 10*3/MM3 (ref 1.7–7)
NEUTROPHILS NFR BLD AUTO: 69.5 % (ref 42.7–76)
NRBC BLD AUTO-RTO: 0 /100 WBC (ref 0–0.2)
PLATELET # BLD AUTO: 194 10*3/MM3 (ref 140–450)
POTASSIUM SERPL-SCNC: 5 MMOL/L (ref 3.5–5.2)
PROT SERPL-MCNC: 6.1 G/DL (ref 6–8.5)
RBC # BLD AUTO: 4.42 10*6/MM3 (ref 3.77–5.28)
SODIUM SERPL-SCNC: 141 MMOL/L (ref 136–145)
TRIGL SERPL-MCNC: 53 MG/DL (ref 0–150)
TSH SERPL DL<=0.005 MIU/L-ACNC: 3.41 UIU/ML (ref 0.27–4.2)
VLDLC SERPL CALC-MCNC: 10 MG/DL (ref 5–40)
WBC # BLD AUTO: 3.64 10*3/MM3 (ref 3.4–10.8)

## 2025-07-11 ENCOUNTER — TELEPHONE (OUTPATIENT)
Dept: INTERNAL MEDICINE | Facility: CLINIC | Age: 75
End: 2025-07-11
Payer: MEDICARE

## 2025-07-11 ENCOUNTER — TELEPHONE (OUTPATIENT)
Dept: INTERNAL MEDICINE | Facility: CLINIC | Age: 75
End: 2025-07-11

## 2025-07-11 NOTE — TELEPHONE ENCOUNTER
Caller: Irlanda Henry    Relationship: Self    Best call back number:     What is the best time to reach you:     Who are you requesting to speak with (clinical staff, provider,  specific staff member):     Do you know the name of the person who called:     What was the call regarding: PATIENT SAYS SHE HAD A BONE DENSITY TEST AND WAS TOLD BY PROVIDER THAT SHE NEEDED TO BE TAKING A SUPPLEMENT. PATIENT IS CALLING IN WITH QUESTIONS REGARDING HER CALCIUM AND VITAMIN D3 MEDICATIONS AS THERE ARE A LOT OF DIFFERENT OPTIONS OVER THE COUNTER AND SHE WANTS TO KNOW EXACTLY WHAT DR HANDY WANTS HER TO BE TAKING.  SHE WANTS TO BE CALLED TO DISCUSS THIS WITH DR HANDY OR STAFF.

## 2025-07-11 NOTE — TELEPHONE ENCOUNTER
Lvm of recommendation of Os-Elbert as one or she can purchase Vit D3 and Calcium separate with 100% DV (daily value)

## 2025-07-19 DIAGNOSIS — I10 HYPERTENSION, UNSPECIFIED TYPE: ICD-10-CM

## 2025-07-21 RX ORDER — OLMESARTAN MEDOXOMIL 20 MG/1
20 TABLET ORAL DAILY
Qty: 90 TABLET | Refills: 0 | Status: SHIPPED | OUTPATIENT
Start: 2025-07-21

## 2025-08-26 ENCOUNTER — OFFICE VISIT (OUTPATIENT)
Dept: CARDIOLOGY | Age: 75
End: 2025-08-26
Payer: MEDICARE

## 2025-08-26 VITALS
HEART RATE: 70 BPM | HEIGHT: 65 IN | BODY MASS INDEX: 27.82 KG/M2 | WEIGHT: 167 LBS | DIASTOLIC BLOOD PRESSURE: 84 MMHG | SYSTOLIC BLOOD PRESSURE: 122 MMHG

## 2025-08-26 DIAGNOSIS — I10 PRIMARY HYPERTENSION: ICD-10-CM

## 2025-08-26 DIAGNOSIS — A80.9 POLIO: Chronic | ICD-10-CM

## 2025-08-26 DIAGNOSIS — R55 NEAR SYNCOPE: Primary | ICD-10-CM

## 2025-08-26 PROCEDURE — 93000 ELECTROCARDIOGRAM COMPLETE: CPT | Performed by: INTERNAL MEDICINE

## 2025-08-26 PROCEDURE — 3074F SYST BP LT 130 MM HG: CPT | Performed by: INTERNAL MEDICINE

## 2025-08-26 PROCEDURE — 3079F DIAST BP 80-89 MM HG: CPT | Performed by: INTERNAL MEDICINE

## 2025-08-26 PROCEDURE — 99214 OFFICE O/P EST MOD 30 MIN: CPT | Performed by: INTERNAL MEDICINE

## (undated) DEVICE — KT MANIFLD CARDIAC

## (undated) DEVICE — CATH DIAG CARD PERFORMA JL3.5 BT 4F100CM

## (undated) DEVICE — GLIDESHEATH BASIC HYDROPHILIC COATED INTRODUCER SHEATH: Brand: GLIDESHEATH

## (undated) DEVICE — GW HITORQUE/BAL MID/WT J W/HCOAT .014 3X190CM

## (undated) DEVICE — GW EMR FIX EXCHG J STD .035 3MM 260CM

## (undated) DEVICE — PK CATH CARD 40

## (undated) DEVICE — CATH VENT MIV RADL PIG ST TIP 4F 110CM

## (undated) DEVICE — CATH DIAG CARD PERFORM JR4.0 BT 4F100CM